# Patient Record
Sex: FEMALE | Race: WHITE | NOT HISPANIC OR LATINO | Employment: FULL TIME
[De-identification: names, ages, dates, MRNs, and addresses within clinical notes are randomized per-mention and may not be internally consistent; named-entity substitution may affect disease eponyms.]

---

## 2017-03-21 ENCOUNTER — RECORDS - HEALTHEAST (OUTPATIENT)
Dept: ADMINISTRATIVE | Facility: OTHER | Age: 22
End: 2017-03-21

## 2017-03-21 ASSESSMENT — MIFFLIN-ST. JEOR: SCORE: 1300.19

## 2017-03-23 ENCOUNTER — ANESTHESIA - HEALTHEAST (OUTPATIENT)
Dept: SURGERY | Facility: CLINIC | Age: 22
End: 2017-03-23

## 2017-03-23 ENCOUNTER — SURGERY - HEALTHEAST (OUTPATIENT)
Dept: SURGERY | Facility: CLINIC | Age: 22
End: 2017-03-23

## 2017-03-23 ASSESSMENT — MIFFLIN-ST. JEOR: SCORE: 1312.9

## 2017-03-24 ASSESSMENT — MIFFLIN-ST. JEOR: SCORE: 1333.31

## 2017-03-25 ASSESSMENT — MIFFLIN-ST. JEOR: SCORE: 1336.03

## 2017-03-26 ASSESSMENT — MIFFLIN-ST. JEOR: SCORE: 1334.67

## 2017-03-27 ENCOUNTER — AMBULATORY - HEALTHEAST (OUTPATIENT)
Dept: PULMONOLOGY | Facility: OTHER | Age: 22
End: 2017-03-27

## 2017-03-27 DIAGNOSIS — Z98.890 POST-OPERATIVE STATE: ICD-10-CM

## 2017-03-27 ASSESSMENT — MIFFLIN-ST. JEOR: SCORE: 1327.98

## 2017-03-28 ASSESSMENT — MIFFLIN-ST. JEOR: SCORE: 1314.71

## 2017-04-04 ENCOUNTER — RECORDS - HEALTHEAST (OUTPATIENT)
Dept: ADMINISTRATIVE | Facility: OTHER | Age: 22
End: 2017-04-04

## 2017-04-04 ENCOUNTER — COMMUNICATION - HEALTHEAST (OUTPATIENT)
Dept: PULMONOLOGY | Facility: OTHER | Age: 22
End: 2017-04-04

## 2017-04-13 ENCOUNTER — AMBULATORY - HEALTHEAST (OUTPATIENT)
Dept: PULMONOLOGY | Facility: OTHER | Age: 22
End: 2017-04-13

## 2017-04-13 ENCOUNTER — HOSPITAL ENCOUNTER (OUTPATIENT)
Dept: RADIOLOGY | Facility: HOSPITAL | Age: 22
Discharge: HOME | End: 2017-04-13
Attending: THORACIC SURGERY (CARDIOTHORACIC VASCULAR SURGERY)

## 2017-04-13 ENCOUNTER — OFFICE VISIT - HEALTHEAST (OUTPATIENT)
Dept: PULMONOLOGY | Facility: OTHER | Age: 22
End: 2017-04-13

## 2017-04-13 DIAGNOSIS — Z98.890 POST-OPERATIVE STATE: ICD-10-CM

## 2017-04-13 DIAGNOSIS — J93.83 SPONTANEOUS PNEUMOTHORAX: ICD-10-CM

## 2017-04-13 RX ORDER — POLYETHYLENE GLYCOL 3350 17 G/17G
17 POWDER, FOR SOLUTION ORAL DAILY PRN
Status: SHIPPED | COMMUNITY
Start: 2017-04-13 | End: 2022-03-04

## 2017-04-13 ASSESSMENT — MIFFLIN-ST. JEOR: SCORE: 1305.3

## 2018-04-27 ENCOUNTER — RECORDS - HEALTHEAST (OUTPATIENT)
Dept: ADMINISTRATIVE | Facility: OTHER | Age: 23
End: 2018-04-27

## 2018-04-27 ENCOUNTER — COMMUNICATION - HEALTHEAST (OUTPATIENT)
Dept: SCHEDULING | Facility: CLINIC | Age: 23
End: 2018-04-27

## 2019-04-17 ENCOUNTER — RECORDS - HEALTHEAST (OUTPATIENT)
Dept: LAB | Facility: CLINIC | Age: 24
End: 2019-04-17

## 2019-04-17 LAB — HIV 1+2 AB+HIV1 P24 AG SERPL QL IA: NEGATIVE

## 2019-04-18 LAB
C TRACH DNA SPEC QL PROBE+SIG AMP: NEGATIVE
N GONORRHOEA DNA SPEC QL NAA+PROBE: NEGATIVE
T PALLIDUM AB SER QL: NEGATIVE

## 2020-02-28 ENCOUNTER — RECORDS - HEALTHEAST (OUTPATIENT)
Dept: LAB | Facility: CLINIC | Age: 25
End: 2020-02-28

## 2020-02-28 LAB — HIV 1+2 AB+HIV1 P24 AG SERPL QL IA: NEGATIVE

## 2020-02-29 LAB — T PALLIDUM AB SER QL: NEGATIVE

## 2020-03-02 LAB
C TRACH DNA SPEC QL PROBE+SIG AMP: NEGATIVE
HSV1 IGG SERPL QL IA: POSITIVE
HSV2 IGG SERPL QL IA: NEGATIVE
N GONORRHOEA DNA SPEC QL NAA+PROBE: NEGATIVE

## 2020-03-13 ASSESSMENT — MIFFLIN-ST. JEOR
SCORE: 1326.95
SCORE: 1324.22

## 2020-03-16 ENCOUNTER — SURGERY - HEALTHEAST (OUTPATIENT)
Dept: SURGERY | Facility: CLINIC | Age: 25
End: 2020-03-16

## 2020-03-16 ENCOUNTER — ANESTHESIA - HEALTHEAST (OUTPATIENT)
Dept: SURGERY | Facility: CLINIC | Age: 25
End: 2020-03-16

## 2020-03-17 ASSESSMENT — MIFFLIN-ST. JEOR: SCORE: 1322.41

## 2020-03-19 ASSESSMENT — MIFFLIN-ST. JEOR: SCORE: 1324.68

## 2020-07-27 ENCOUNTER — RECORDS - HEALTHEAST (OUTPATIENT)
Dept: ADMINISTRATIVE | Facility: OTHER | Age: 25
End: 2020-07-27

## 2021-05-13 ENCOUNTER — RECORDS - HEALTHEAST (OUTPATIENT)
Dept: LAB | Facility: CLINIC | Age: 26
End: 2021-05-13

## 2021-05-15 LAB — BACTERIA SPEC CULT: NORMAL

## 2021-05-30 VITALS — BODY MASS INDEX: 18.24 KG/M2 | WEIGHT: 116.2 LBS | HEIGHT: 67 IN

## 2021-05-30 VITALS — WEIGHT: 115 LBS | HEIGHT: 67 IN | BODY MASS INDEX: 18.05 KG/M2

## 2021-06-01 VITALS — WEIGHT: 111 LBS | HEIGHT: 67 IN | BODY MASS INDEX: 17.39 KG/M2 | BODY MASS INDEX: 18.01 KG/M2

## 2021-06-04 VITALS — HEIGHT: 67 IN | BODY MASS INDEX: 18.72 KG/M2 | BODY MASS INDEX: 17.54 KG/M2 | WEIGHT: 112 LBS

## 2021-06-06 NOTE — ANESTHESIA POSTPROCEDURE EVALUATION
Patient: Gabriela Eason  Procedure(s):  THORACOSCOPY, LEFT LUNG WEDGE RESECTION, PLEURODESIS, CHEST TUBE PLACEMENT (Left)  Anesthesia type: general    Patient location: PACU  Last vitals:   Vitals Value Taken Time   /61 3/16/2020  2:26 PM   Temp 36.6  C (97.8  F) 3/16/2020  2:26 PM   Pulse 101 3/16/2020  2:26 PM   Resp 24 3/16/2020  2:26 PM   SpO2 98 % 3/16/2020  2:05 PM   Vitals shown include unvalidated device data.  Post vital signs: stable  Level of consciousness: awake and responds to simple questions  Post-anesthesia pain: pain controlled  Post-anesthesia nausea and vomiting: no  Pulmonary: unassisted, return to baseline  Cardiovascular: stable and blood pressure at baseline  Hydration: adequate  Anesthetic events: no    QCDR Measures:  ASA# 11 - Gabi-op Cardiac Arrest: ASA11B - Patient did NOT experience unanticipated cardiac arrest  ASA# 12 - Gabi-op Mortality Rate: ASA12B - Patient did NOT die  ASA# 13 - PACU Re-Intubation Rate: ASA13B - Patient did NOT require a new airway mgmt  ASA# 10 - Composite Anes Safety: ASA10A - No serious adverse event    Additional Notes:

## 2021-06-06 NOTE — ANESTHESIA CARE TRANSFER NOTE
Last vitals:   Vitals:    03/16/20 1246   BP: 108/66   Pulse: 89   Resp: 18   Temp: 36.1  C (97  F)   SpO2: 99%     Patient's level of consciousness is drowsy  Spontaneous respirations: yes  Maintains airway independently: yes  Dentition unchanged: yes  Oropharynx: oropharynx clear of all foreign objects    QCDR Measures:  ASA# 20 - Surgical Safety Checklist: WHO surgical safety checklist completed prior to induction    PQRS# 430 - Adult PONV Prevention: 4558F - Pt received => 2 anti-emetic agents (different classes) preop & intraop  ASA# 8 - Peds PONV Prevention: NA - Not pediatric patient, not GA or 2 or more risk factors NOT present  PQRS# 424 - Gabi-op Temp Management: 4559F - At least one body temp DOCUMENTED => 35.5C or 95.9F within required timeframe  PQRS# 426 - PACU Transfer Protocol: - Transfer of care checklist used  ASA# 14 - Acute Post-op Pain: ASA14B - Patient did NOT experience pain >= 7 out of 10

## 2021-06-09 NOTE — ANESTHESIA CARE TRANSFER NOTE
Last vitals:   Vitals:    03/23/17 1930   BP:    Pulse: 90   Resp: 20   Temp:    SpO2: 100%     Patient's level of consciousness is drowsy  Spontaneous respirations: yes  Maintains airway independently: yes  Dentition unchanged: yes  Oropharynx: oropharynx clear of all foreign objects    QCDR Measures:  ASA# 20 - Surgical Safety Checklist: ASA20A - Safety Checks Done  PQRS# 430 - Adult PONV Prevention: 4558F - Pt received => 2 anti-emetic agents (different classes) preop & intraop  ASA# 8 - Peds PONV Prevention: NA - Not pediatric patient, not GA or 2 or more risk factors NOT present  PQRS# 424 - Gabi-op Temp Management: 4559F - At least one body temp DOCUMENTED => 35.5C or 95.9F within required timeframe  PQRS# 426 - PACU Transfer Protocol: - Transfer of care checklist used  ASA# 14 - Acute Post-op Pain: ASA14B - Patient did NOT experience pain >= 7 out of 10    I completed my SBAR handoff to the receiving nurse per policy and procedure.

## 2021-06-09 NOTE — ANESTHESIA PREPROCEDURE EVALUATION
Anesthesia Evaluation      Patient summary reviewed   No history of anesthetic complications     Airway   Mallampati: I  Neck ROM: full   Pulmonary - normal exam                          Cardiovascular - negative ROS and normal exam  Exercise tolerance: > or = 10 METS  (-) murmur  Rhythm: regular  Rate: normal,    no murmur      Neuro/Psych - negative ROS     Endo/Other - negative ROS      GI/Hepatic/Renal - negative ROS           Dental - normal exam                        Anesthesia Plan  Planned anesthetic: general endotracheal    ASA 1   Induction: intravenous   Anesthetic plan and risks discussed with: patient and parent/guardian  Anesthesia plan special considerations: antiemetics,   Post-op plan: routine recovery

## 2021-06-09 NOTE — ANESTHESIA POSTPROCEDURE EVALUATION
Patient: Gabriela Eason  RIGHT VIDEO ASSISTED THORACOSCOPY WITH MECHANICAL PLEURODESIS AND APICAL PLEURECTOMY THROUGH A SUBXYPHIOD APPROACH  Anesthesia type: general    Patient location: PACU  Last vitals:   Vitals:    03/23/17 2015   BP: 112/58   Pulse: 84   Resp: 15   Temp:    SpO2: 100%     Post vital signs: stable  Level of consciousness: awake and responds to simple questions  Post-anesthesia pain: pain controlled  Post-anesthesia nausea and vomiting: no  Pulmonary: unassisted, return to baseline  Cardiovascular: stable and blood pressure at baseline  Hydration: adequate  Anesthetic events: no    QCDR Measures:  ASA# 11 - Gabi-op Cardiac Arrest: ASA11B - Patient did NOT experience unanticipated cardiac arrest  ASA# 12 - Gabi-op Mortality Rate: ASA12B - Patient did NOT die  ASA# 13 - PACU Re-Intubation Rate: ASA13B - Patient did NOT require a new airway mgmt  ASA# 10 - Composite Anes Safety: ASA10A - No serious adverse event  ASA# 38 - New Corneal Injury: ASA38A - No new exposure keratitis or corneal abrasion in PACU    Additional Notes:

## 2021-06-10 NOTE — PROGRESS NOTES
THORACIC SURGERY ESTABLISHED PATIENT OFFICE VISIT    Dear Dr. Adams,    I saw Ms. Gabriela Eason in follow-up today. The clinical summary follows:     PREOP DIAGNOSIS   Spontaneous RIGHT pneumothorax    PROCEDURE   RIGHT subxyphoid upper lobe wedge resection (blebectomy) and partial pleurectomy, mechanical pleurodesis    DATE OF PROCEDURE  3/23/2017    HISTOPATHOLOGY  Bleb, pleural scarring.    COMPLICATIONS  None    INTERVAL STUDIES  CXR today: expected postoperative changes    ETOH neg  TOB neg  BMI 18    SUBJECTIVE  Gabriela has essentially completely recovered.    IMPRESSION   1. Spontaneous pneumothorax       21 year-old female 3 weeks S/P RIGHT VATS blebectomy for spontaneous pneumothorax.    PLAN  I reviewed the plan as follows:    1) No further follow-up required with me       They had all their questions answered and were in agreement with the plan.  I appreciate the opportunity to participate in the care of your patient and will keep you updated.    Sincerely,

## 2021-06-15 PROBLEM — J93.0 TENSION PNEUMOTHORAX, SPONTANEOUS: Status: ACTIVE | Noted: 2017-03-21

## 2021-06-15 PROBLEM — J96.00 ACUTE RESPIRATORY FAILURE (H): Status: ACTIVE | Noted: 2017-03-24

## 2021-06-15 PROBLEM — J93.0 TENSION PNEUMOTHORAX: Status: ACTIVE | Noted: 2017-03-21

## 2021-06-15 PROBLEM — Z93.8: Status: ACTIVE | Noted: 2017-03-24

## 2021-06-15 PROBLEM — K59.00 CONSTIPATION: Status: ACTIVE | Noted: 2017-03-29

## 2021-06-16 PROBLEM — J93.9 PNEUMOTHORAX: Status: ACTIVE | Noted: 2020-03-13

## 2021-06-16 PROBLEM — Z96.89 CHEST TUBE IN PLACE: Status: ACTIVE | Noted: 2020-03-13

## 2021-08-10 ENCOUNTER — LAB REQUISITION (OUTPATIENT)
Dept: LAB | Facility: CLINIC | Age: 26
End: 2021-08-10
Payer: COMMERCIAL

## 2021-08-10 DIAGNOSIS — Z11.9 ENCOUNTER FOR SCREENING FOR INFECTIOUS AND PARASITIC DISEASES, UNSPECIFIED: ICD-10-CM

## 2021-08-10 DIAGNOSIS — Z11.4 ENCOUNTER FOR SCREENING FOR HUMAN IMMUNODEFICIENCY VIRUS (HIV): ICD-10-CM

## 2021-08-10 DIAGNOSIS — Z11.3 ENCOUNTER FOR SCREENING FOR INFECTIONS WITH A PREDOMINANTLY SEXUAL MODE OF TRANSMISSION: ICD-10-CM

## 2021-08-10 DIAGNOSIS — Z11.8 ENCOUNTER FOR SCREENING FOR OTHER INFECTIOUS AND PARASITIC DISEASES: ICD-10-CM

## 2021-08-10 LAB — HIV 1+2 AB+HIV1 P24 AG SERPL QL IA: NEGATIVE

## 2021-08-10 PROCEDURE — 87389 HIV-1 AG W/HIV-1&-2 AB AG IA: CPT | Performed by: NURSE PRACTITIONER

## 2021-08-10 PROCEDURE — 87591 N.GONORRHOEAE DNA AMP PROB: CPT | Mod: ORL | Performed by: NURSE PRACTITIONER

## 2021-08-10 PROCEDURE — 87086 URINE CULTURE/COLONY COUNT: CPT | Mod: ORL | Performed by: NURSE PRACTITIONER

## 2021-08-10 PROCEDURE — 86803 HEPATITIS C AB TEST: CPT | Performed by: NURSE PRACTITIONER

## 2021-08-10 PROCEDURE — 87491 CHLMYD TRACH DNA AMP PROBE: CPT | Mod: ORL | Performed by: NURSE PRACTITIONER

## 2021-08-11 LAB
BACTERIA UR CULT: NO GROWTH
HCV AB SERPL QL IA: NEGATIVE

## 2021-08-12 LAB
C TRACH DNA SPEC QL NAA+PROBE: NEGATIVE
N GONORRHOEA DNA SPEC QL NAA+PROBE: NEGATIVE

## 2022-01-11 ENCOUNTER — LAB REQUISITION (OUTPATIENT)
Dept: LAB | Facility: CLINIC | Age: 27
End: 2022-01-11
Payer: COMMERCIAL

## 2022-01-11 DIAGNOSIS — Z32.01 ENCOUNTER FOR PREGNANCY TEST, RESULT POSITIVE: ICD-10-CM

## 2022-01-11 LAB
ABO/RH(D): NORMAL
ANTIBODY SCREEN: NEGATIVE
BASOPHILS # BLD AUTO: 0 10E3/UL (ref 0–0.2)
BASOPHILS NFR BLD AUTO: 0 %
EOSINOPHIL # BLD AUTO: 0.1 10E3/UL (ref 0–0.7)
EOSINOPHIL NFR BLD AUTO: 1 %
ERYTHROCYTE [DISTWIDTH] IN BLOOD BY AUTOMATED COUNT: 12.2 % (ref 10–15)
HCT VFR BLD AUTO: 39.5 % (ref 35–47)
HGB BLD-MCNC: 13.2 G/DL (ref 11.7–15.7)
HIV 1+2 AB+HIV1 P24 AG SERPL QL IA: NEGATIVE
IMM GRANULOCYTES # BLD: 0 10E3/UL
IMM GRANULOCYTES NFR BLD: 0 %
LYMPHOCYTES # BLD AUTO: 2.1 10E3/UL (ref 0.8–5.3)
LYMPHOCYTES NFR BLD AUTO: 28 %
MCH RBC QN AUTO: 30.3 PG (ref 26.5–33)
MCHC RBC AUTO-ENTMCNC: 33.4 G/DL (ref 31.5–36.5)
MCV RBC AUTO: 91 FL (ref 78–100)
MONOCYTES # BLD AUTO: 0.4 10E3/UL (ref 0–1.3)
MONOCYTES NFR BLD AUTO: 6 %
NEUTROPHILS # BLD AUTO: 4.9 10E3/UL (ref 1.6–8.3)
NEUTROPHILS NFR BLD AUTO: 65 %
NRBC # BLD AUTO: 0 10E3/UL
NRBC BLD AUTO-RTO: 0 /100
PLATELET # BLD AUTO: 204 10E3/UL (ref 150–450)
RBC # BLD AUTO: 4.36 10E6/UL (ref 3.8–5.2)
SPECIMEN EXPIRATION DATE: NORMAL
SPECIMEN EXPIRATION DATE: NORMAL
WBC # BLD AUTO: 7.6 10E3/UL (ref 4–11)

## 2022-01-11 PROCEDURE — 86850 RBC ANTIBODY SCREEN: CPT | Performed by: NURSE PRACTITIONER

## 2022-01-11 PROCEDURE — 86762 RUBELLA ANTIBODY: CPT | Mod: ORL | Performed by: NURSE PRACTITIONER

## 2022-01-11 PROCEDURE — 86780 TREPONEMA PALLIDUM: CPT | Mod: ORL | Performed by: NURSE PRACTITIONER

## 2022-01-11 PROCEDURE — 87491 CHLMYD TRACH DNA AMP PROBE: CPT | Mod: ORL | Performed by: NURSE PRACTITIONER

## 2022-01-11 PROCEDURE — 87389 HIV-1 AG W/HIV-1&-2 AB AG IA: CPT | Mod: ORL | Performed by: NURSE PRACTITIONER

## 2022-01-11 PROCEDURE — 86803 HEPATITIS C AB TEST: CPT | Mod: ORL | Performed by: NURSE PRACTITIONER

## 2022-01-11 PROCEDURE — 86901 BLOOD TYPING SEROLOGIC RH(D): CPT | Performed by: NURSE PRACTITIONER

## 2022-01-11 PROCEDURE — 85025 COMPLETE CBC W/AUTO DIFF WBC: CPT | Mod: ORL | Performed by: NURSE PRACTITIONER

## 2022-01-11 PROCEDURE — 87340 HEPATITIS B SURFACE AG IA: CPT | Mod: ORL | Performed by: NURSE PRACTITIONER

## 2022-01-11 PROCEDURE — 87086 URINE CULTURE/COLONY COUNT: CPT | Mod: ORL | Performed by: NURSE PRACTITIONER

## 2022-01-12 LAB
BACTERIA UR CULT: NO GROWTH
HBV SURFACE AG SERPL QL IA: NONREACTIVE
HCV AB SERPL QL IA: NEGATIVE
RUBV IGG SERPL QL IA: 1.76 INDEX
RUBV IGG SERPL QL IA: POSITIVE
T PALLIDUM AB SER QL: NONREACTIVE

## 2022-01-13 LAB
C TRACH DNA SPEC QL PROBE+SIG AMP: NEGATIVE
N GONORRHOEA DNA SPEC QL NAA+PROBE: NEGATIVE

## 2022-01-18 ENCOUNTER — HOSPITAL ENCOUNTER (EMERGENCY)
Facility: HOSPITAL | Age: 27
Discharge: HOME | End: 2022-01-19
Attending: EMERGENCY MEDICINE | Admitting: EMERGENCY MEDICINE
Payer: COMMERCIAL

## 2022-01-18 VITALS — WEIGHT: 119.5 LBS | HEIGHT: 67 IN | BODY MASS INDEX: 18.75 KG/M2

## 2022-01-18 DIAGNOSIS — J93.9 PNEUMOTHORAX ON RIGHT: ICD-10-CM

## 2022-01-18 DIAGNOSIS — S29.8XXA BLUNT CHEST TRAUMA, INITIAL ENCOUNTER: ICD-10-CM

## 2022-01-18 PROCEDURE — 99283 EMERGENCY DEPT VISIT LOW MDM: CPT | Mod: 25

## 2022-01-19 ENCOUNTER — APPOINTMENT (OUTPATIENT)
Dept: RADIOLOGY | Facility: HOSPITAL | Age: 27
End: 2022-01-19
Attending: EMERGENCY MEDICINE
Payer: COMMERCIAL

## 2022-01-19 VITALS
TEMPERATURE: 98.7 F | BODY MASS INDEX: 18.18 KG/M2 | OXYGEN SATURATION: 99 % | DIASTOLIC BLOOD PRESSURE: 63 MMHG | RESPIRATION RATE: 16 BRPM | WEIGHT: 119.93 LBS | SYSTOLIC BLOOD PRESSURE: 106 MMHG | HEART RATE: 83 BPM | HEIGHT: 68 IN

## 2022-01-19 PROCEDURE — 71046 X-RAY EXAM CHEST 2 VIEWS: CPT

## 2022-01-19 RX ORDER — ACETAMINOPHEN 325 MG/1
975 TABLET ORAL ONCE
Status: DISCONTINUED | OUTPATIENT
Start: 2022-01-19 | End: 2022-01-19 | Stop reason: HOSPADM

## 2022-01-19 ASSESSMENT — MIFFLIN-ST. JEOR: SCORE: 1334.56

## 2022-01-19 NOTE — DISCHARGE INSTRUCTIONS
Follow-up with your clinic doctors tomorrow as previously arranged  If you develop worsening shortness of breath or chest pain then you should have a repeat chest x-ray to evaluate your possible pneumothorax.  Tylenol 650 mg every 4 hours as needed for pain  Return to the emergency department for worsening problems or concerns

## 2022-01-19 NOTE — ED PROVIDER NOTES
EMERGENCY DEPARTMENT ENCOUnter      NAME: Gabriela Eason  AGE: 26 year old female  YOB: 1995  MRN: [id number]  EVALUATION DATE & TIME: 01/19/22 1:36 AM     PCP: Dax Trinidad [facility]    ED PROVIDER: Erica Menchaca MD      Chief Complaint   Patient presents with     Fall     Right Ribs Area Pain         FINAL IMPRESSION:  1. Pneumothorax on right    2. Blunt chest trauma, initial encounter          ED COURSE & MEDICAL DECISION MAKING:      In summary, the patient is a 27 yo female that presents to the ED for evaluation of rib pain after a fall thought secondary to a small pneumothorax and probable chest wall contusion.  We will manage conservatively with close outpatient follow up.  1:30AM I met with the patient for the initial interview and physical examination. Discussed plan for treatment and workup in the ED. PPE: Provider wore surgical cap, goggles, and N95 mask.  2:37 AM I discussed the case with Dr. Waldrop, general surgeon.  He recommends conservative management without chest tube.  He did not think patient needed to be admitted nor a repeat chest xray unless her symptoms worsened.  2:45 AM I reassessed the patient and updated her on the results. I discussed the plan for discharge with the patient, and patient is agreeable. We discussed supportive cares at home and reasons for return to the ER including new or worsening symptoms - all questions and concerns addressed. Patient to be discharged by RN.     At the conclusion of the encounter I discussed the results of all of the tests and the disposition. The questions were answered. The patient or family acknowledged understanding and was agreeable with the care plan.         MEDICATIONS GIVEN IN THE EMERGENCY:  Medications - No data to display    NEW PRESCRIPTIONS STARTED AT TODAY'S ER VISIT  There are no discharge medications for this patient.          =================================================================    HPI    Gabriela Eason is a 26 year old female with a history of pneumothorax who presents to this ED for evaluation of fall and rib pain.     Per chart review, patient was seen at [facility] on 1/18/22 (1 day ago) for evaluation of head injury. Provider felt that history, symptoms, and presentation was consistent with headache and possibly a mild concussion. No imaging was performed and patient understood that intracranial abnormality could not be ruled out in clinic. Was also concerned about the pregnancy, informed that imaging was not available at that location. Patient declined a higher level evaluation in the ER and preferred supportive management and symptomatic treatment at home.     Patient had a fall yesterday morning (1/18) where she was walking around in socks, slipped on the wood floor, fell backwards, and struck her head on the floor. She was evaluated at urgent care that morning and had a normal head CT, but no chest pain at that time. However, reports right anterior chest pain onset tonight which wraps around to her back, currently rated 7/10. Provoking factors include taking breaths. Notes history of pneumothorax. Patient is currently 6 weeks pregnant, only taking prenatal vitamins. No known allergies to medications. Patient does not have any other associated complaints or concerns at this time.     Social: Denies tobacco use. Occasional alcohol use. Works in healthcare.       REVIEW OF SYSTEMS     Constitutional:  Denies fever or chills  HENT:  Denies sore throat   Respiratory:  Denies cough or shortness of breath   Cardiovascular:  Denies palpitations. Reports right chest wall pain  GI:  Denies abdominal pain, nausea, or vomiting  Musculoskeletal:  Denies any new extremity pain   Skin:  Denies rash   Neurologic:  Denies headache, focal weakness or sensory changes    All other systems reviewed and are negative      PAST  MEDICAL HISTORY:  Past Medical History:   Diagnosis Date     Acute respiratory failure (H) 3/24/2017     PONV (postoperative nausea and vomiting)      Status post thoracostomy tube placement (H) 3/24/2017       PAST SURGICAL HISTORY:  Past Surgical History:   Procedure Laterality Date     THORACOSCOPY Right 3/23/2017    Procedure: RIGHT VIDEO ASSISTED THORACOSCOPY WITH MECHANICAL PLEURODESIS AND APICAL PLEURECTOMY THROUGH A SUBXYPHIOD APPROACH;  Surgeon: Ricky Hernandez MD;  Location: [facility];  Service:      [facility] THORACOSCOPY,DX NO BX Left 3/16/2020    Procedure: THORACOSCOPY, LEFT LUNG WEDGE RESECTION, PLEURODESIS, CHEST TUBE PLACEMENT;  Surgeon: Dax Arias MD;  Location: [facility];  Service: General           CURRENT MEDICATIONS:    FLUoxetine (PROZAC) 20 MG capsule  norgestimate-ethinyl estradioL (ESTARYLLA) 0.25-35 mg-mcg per tablet  polyethylene glycol (MIRALAX) 17 gram packet        ALLERGIES:  No Known Allergies    SOCIAL HISTORY:   Social History     Socioeconomic History     Marital status: Single     Spouse name: None     Number of children: None     Years of education: None     Highest education level: None   Occupational History     None   Tobacco Use     Smoking status: Never Smoker     Smokeless tobacco: Never Used   Substance and Sexual Activity     Alcohol use: No     Drug use: No     Sexual activity: Never     Birth control/protection: Injection   Other Topics Concern     None   Social History Narrative    ** Merged History Encounter **          Social Determinants of Health     Financial Resource Strain: Not on file   Food Insecurity: Not on file   Transportation Needs: Not on file   Physical Activity: Not on file   Stress: Not on file   Social Connections: Not on file   Intimate Partner Violence: Not on file   Housing Stability: Not on file       PHYSICAL EXAM    Constitutional:  Well developed, Well nourished,  HENT:  Normocephalic, Atraumatic, Bilateral external  ears normal, Oropharynx moist, Nose normal.   Neck:  Normal range of motion, No meningismus, No stridor.   Eyes:  EOMI, Conjunctiva normal, No discharge.   Respiratory:  Normal breath sounds, No respiratory distress, No wheezing, right sided chest tenderness.   Cardiovascular:  Normal heart rate, Normal rhythm, No murmurs  GI:  Soft, No tenderness, No guarding, No CVA tenderness.   Musculoskeletal:  Neurovascularly intact distally, No edema, No tenderness, No cyanosis, Good range of motion in all major joints. No tenderness to palpation or major deformities noted.   Integument:  Warm, Dry, No erythema, No rash.   Lymphatic:  No lymphadenopathy noted.   Neurologic:  Alert & oriented , Normal motor function,  No focal deficits noted.   Psychiatric:  Affect normal, Judgment normal, Mood normal.      LAB:  All pertinent labs reviewed and interpreted.  Results for orders placed or performed during the hospital encounter of 01/18/22   Chest XR,  PA & LAT    Impression    IMPRESSION: Possible very subtle right apical pneumothorax measuring approximately 3 mm. Given history of trauma consider further evaluation with chest CT to confirm. Postoperative changes in both lung apices with surgical staple line. No pulmonary   infiltrates. New metallic density projecting over the subcutaneous tissues of the posterior back on the right. This may be due to penetrating trauma if there is any concerning clinical history. Normal heart size and pulmonary vascularity. No mediastinal   shift. No overt osseous abnormality.       RADIOLOGY:  I have independently reviewed and interpreted the above imaging, pending the final radiology read.  Chest XR,  PA & LAT   Final Result   IMPRESSION: Possible very subtle right apical pneumothorax measuring approximately 3 mm. Given history of trauma consider further evaluation with chest CT to confirm. Postoperative changes in both lung apices with surgical staple line. No pulmonary    infiltrates. New  metallic density projecting over the subcutaneous tissues of the posterior back on the right. This may be due to penetrating trauma if there is any concerning clinical history. Normal heart size and pulmonary vascularity. No mediastinal    shift. No overt osseous abnormality.                I, Kelsi Madrigal, am serving as a scribe to document services personally performed by Dr. Menchaca based on my observation and the provider's statements to me. I, Erica Menchaca MD attest that Kelsi Madrigal is acting in a scribe capacity, has observed my performance of the services and has documented them in accordance with my direction.    Erica Menchaca MD  Emergency Medicine  [facility] EMERGENCY DEPARTMENT  [facility] [facility] [id number] [phone]  Dept: [phone]     Erica Menchaca MD  02/05/22 7521

## 2022-01-19 NOTE — ED TRIAGE NOTES
Pt fell this past am and hit her right ribs. Pt was seen at . Pt now complains of right ribs area pain. Pt is 6 weeks pregnant

## 2022-02-08 ENCOUNTER — LAB REQUISITION (OUTPATIENT)
Dept: LAB | Facility: CLINIC | Age: 27
End: 2022-02-08

## 2022-02-08 PROCEDURE — 86706 HEP B SURFACE ANTIBODY: CPT | Performed by: FAMILY MEDICINE

## 2022-02-08 PROCEDURE — G0123 SCREEN CERV/VAG THIN LAYER: HCPCS | Performed by: FAMILY MEDICINE

## 2022-02-09 LAB
BKR LAB AP GYN ADEQUACY: NORMAL
BKR LAB AP GYN INTERPRETATION: NORMAL
BKR LAB AP HPV REFLEX: NORMAL
BKR LAB AP LMP: NORMAL
BKR LAB AP PREVIOUS ABNORMAL: NORMAL
HBV SURFACE AB SERPLBLD QL IA.RAPID: NEGATIVE
PATH REPORT.COMMENTS IMP SPEC: NORMAL
PATH REPORT.COMMENTS IMP SPEC: NORMAL
PATH REPORT.RELEVANT HX SPEC: NORMAL

## 2022-03-04 ENCOUNTER — HOSPITAL ENCOUNTER (OUTPATIENT)
Facility: HOSPITAL | Age: 27
Setting detail: OBSERVATION
Discharge: HOME | End: 2022-03-06
Attending: EMERGENCY MEDICINE | Admitting: INTERNAL MEDICINE
Payer: COMMERCIAL

## 2022-03-04 ENCOUNTER — APPOINTMENT (OUTPATIENT)
Dept: RADIOLOGY | Facility: HOSPITAL | Age: 27
End: 2022-03-04
Attending: EMERGENCY MEDICINE
Payer: COMMERCIAL

## 2022-03-04 ENCOUNTER — APPOINTMENT (OUTPATIENT)
Dept: RADIOLOGY | Facility: HOSPITAL | Age: 27
End: 2022-03-04
Attending: INTERNAL MEDICINE
Payer: COMMERCIAL

## 2022-03-04 DIAGNOSIS — Z96.89 CHEST TUBE IN PLACE: ICD-10-CM

## 2022-03-04 DIAGNOSIS — J93.9 PNEUMOTHORAX ON RIGHT: ICD-10-CM

## 2022-03-04 DIAGNOSIS — Z3A.13 13 WEEKS GESTATION OF PREGNANCY: ICD-10-CM

## 2022-03-04 DIAGNOSIS — J93.0 TENSION PNEUMOTHORAX, SPONTANEOUS: ICD-10-CM

## 2022-03-04 DIAGNOSIS — J93.9 PNEUMOTHORAX, UNSPECIFIED TYPE: Primary | ICD-10-CM

## 2022-03-04 DIAGNOSIS — J93.0 TENSION PNEUMOTHORAX: ICD-10-CM

## 2022-03-04 DIAGNOSIS — Z93.8: ICD-10-CM

## 2022-03-04 LAB
ANION GAP SERPL CALCULATED.3IONS-SCNC: 10 MMOL/L (ref 5–18)
BASOPHILS # BLD AUTO: 0 10E3/UL (ref 0–0.2)
BASOPHILS NFR BLD AUTO: 0 %
BUN SERPL-MCNC: 7 MG/DL (ref 8–22)
CALCIUM SERPL-MCNC: 8.8 MG/DL (ref 8.5–10.5)
CHLORIDE BLD-SCNC: 104 MMOL/L (ref 98–107)
CO2 SERPL-SCNC: 21 MMOL/L (ref 22–31)
CREAT SERPL-MCNC: 0.64 MG/DL (ref 0.6–1.1)
EOSINOPHIL # BLD AUTO: 0.1 10E3/UL (ref 0–0.7)
EOSINOPHIL NFR BLD AUTO: 1 %
ERYTHROCYTE [DISTWIDTH] IN BLOOD BY AUTOMATED COUNT: 11.9 % (ref 10–15)
GFR SERPL CREATININE-BSD FRML MDRD: >90 ML/MIN/1.73M2
GLUCOSE BLD-MCNC: 127 MG/DL (ref 70–125)
HCT VFR BLD AUTO: 37.1 % (ref 35–47)
HGB BLD-MCNC: 12.3 G/DL (ref 11.7–15.7)
IMM GRANULOCYTES # BLD: 0 10E3/UL
IMM GRANULOCYTES NFR BLD: 0 %
LYMPHOCYTES # BLD AUTO: 2.3 10E3/UL (ref 0.8–5.3)
LYMPHOCYTES NFR BLD AUTO: 30 %
MCH RBC QN AUTO: 30.1 PG (ref 26.5–33)
MCHC RBC AUTO-ENTMCNC: 33.2 G/DL (ref 31.5–36.5)
MCV RBC AUTO: 91 FL (ref 78–100)
MONOCYTES # BLD AUTO: 0.4 10E3/UL (ref 0–1.3)
MONOCYTES NFR BLD AUTO: 5 %
NEUTROPHILS # BLD AUTO: 4.8 10E3/UL (ref 1.6–8.3)
NEUTROPHILS NFR BLD AUTO: 64 %
NRBC # BLD AUTO: 0 10E3/UL
NRBC BLD AUTO-RTO: 0 /100
PLATELET # BLD AUTO: 178 10E3/UL (ref 150–450)
POTASSIUM BLD-SCNC: 3.3 MMOL/L (ref 3.5–5)
POTASSIUM BLD-SCNC: 3.6 MMOL/L (ref 3.5–5)
RBC # BLD AUTO: 4.08 10E6/UL (ref 3.8–5.2)
SARS-COV-2 RNA RESP QL NAA+PROBE: NEGATIVE
SODIUM SERPL-SCNC: 135 MMOL/L (ref 136–145)
WBC # BLD AUTO: 7.6 10E3/UL (ref 4–11)

## 2022-03-04 PROCEDURE — 99285 EMERGENCY DEPT VISIT HI MDM: CPT | Mod: 25

## 2022-03-04 PROCEDURE — 250N000011 HC RX IP 250 OP 636: Performed by: INTERNAL MEDICINE

## 2022-03-04 PROCEDURE — 84132 ASSAY OF SERUM POTASSIUM: CPT | Mod: 91 | Performed by: INTERNAL MEDICINE

## 2022-03-04 PROCEDURE — 36415 COLL VENOUS BLD VENIPUNCTURE: CPT | Performed by: INTERNAL MEDICINE

## 2022-03-04 PROCEDURE — 71046 X-RAY EXAM CHEST 2 VIEWS: CPT

## 2022-03-04 PROCEDURE — 250N000013 HC RX MED GY IP 250 OP 250 PS 637: Performed by: INTERNAL MEDICINE

## 2022-03-04 PROCEDURE — G0378 HOSPITAL OBSERVATION PER HR: HCPCS

## 2022-03-04 PROCEDURE — 99207 PR CDG-CODE CATEGORY CHANGED: CPT | Performed by: INTERNAL MEDICINE

## 2022-03-04 PROCEDURE — 71045 X-RAY EXAM CHEST 1 VIEW: CPT

## 2022-03-04 PROCEDURE — U0005 INFEC AGEN DETEC AMPLI PROBE: HCPCS | Performed by: EMERGENCY MEDICINE

## 2022-03-04 PROCEDURE — 96375 TX/PRO/DX INJ NEW DRUG ADDON: CPT

## 2022-03-04 PROCEDURE — 99221 1ST HOSP IP/OBS SF/LOW 40: CPT | Performed by: INTERNAL MEDICINE

## 2022-03-04 PROCEDURE — 82310 ASSAY OF CALCIUM: CPT | Performed by: INTERNAL MEDICINE

## 2022-03-04 PROCEDURE — 85025 COMPLETE CBC W/AUTO DIFF WBC: CPT | Performed by: INTERNAL MEDICINE

## 2022-03-04 PROCEDURE — 99220 PR INITIAL OBSERVATION CARE,LEVEL III: CPT | Performed by: INTERNAL MEDICINE

## 2022-03-04 PROCEDURE — C9803 HOPD COVID-19 SPEC COLLECT: HCPCS

## 2022-03-04 RX ORDER — POTASSIUM CHLORIDE 1500 MG/1
20 TABLET, EXTENDED RELEASE ORAL ONCE
Status: COMPLETED | OUTPATIENT
Start: 2022-03-04 | End: 2022-03-04

## 2022-03-04 RX ORDER — PRENATAL VIT/IRON FUM/FOLIC AC 27MG-0.8MG
1 TABLET ORAL DAILY
Status: DISCONTINUED | OUTPATIENT
Start: 2022-03-04 | End: 2022-03-06 | Stop reason: HOSPADM

## 2022-03-04 RX ORDER — LIDOCAINE 40 MG/G
CREAM TOPICAL
Status: DISCONTINUED | OUTPATIENT
Start: 2022-03-04 | End: 2022-03-06 | Stop reason: HOSPADM

## 2022-03-04 RX ORDER — FAMOTIDINE 40 MG/1
40 TABLET, FILM COATED ORAL AT BEDTIME
Status: ON HOLD | COMMUNITY
Start: 2022-01-12 | End: 2022-09-08

## 2022-03-04 RX ORDER — POTASSIUM CHLORIDE 7.45 MG/ML
10 INJECTION INTRAVENOUS
Status: DISCONTINUED | OUTPATIENT
Start: 2022-03-04 | End: 2022-03-04

## 2022-03-04 RX ADMIN — PRENATAL VIT W/ FE FUMARATE-FA TAB 27-0.8 MG 1 TABLET: 27-0.8 TAB at 20:20

## 2022-03-04 RX ADMIN — POTASSIUM CHLORIDE 10 MEQ: 7.46 INJECTION, SOLUTION INTRAVENOUS at 20:45

## 2022-03-04 RX ADMIN — POTASSIUM CHLORIDE 20 MEQ: 1500 TABLET, EXTENDED RELEASE ORAL at 21:32

## 2022-03-04 ASSESSMENT — ACTIVITIES OF DAILY LIVING (ADL): DEPENDENT_IADLS:: INDEPENDENT

## 2022-03-04 ASSESSMENT — ENCOUNTER SYMPTOMS
COUGH: 0
FEVER: 0
SHORTNESS OF BREATH: 1

## 2022-03-04 NOTE — ED PROVIDER NOTES
EMERGENCY DEPARTMENT ENCOUNTER      NAME: Gabriela Eason  AGE: 26 year old female  YOB: 1995  MRN: [id number]  EVALUATION DATE & TIME: 3/4/2022  1:59 PM    PCP: Dax Trinidad [facility]    ED PROVIDER: Yu Da Silva M.D.      Chief Complaint   Patient presents with     Shortness of Breath     FINAL IMPRESSION:  1. Pneumothorax on right      ED COURSE & MEDICAL DECISION MAKING:    Pertinent Labs & Imaging studies reviewed. (See chart for details)  ED Course as of 03/04/22 2132   Fri Mar 04, 2022   1415 Patient is a 26-year-old female who comes in today with shortness of breath that started around 10:00 this morning.  She also reports chest pain on that right side.  She had similar symptoms before when she has had pneumothoraces.  She had an episode several years ago where she needed a chest tube because she had a large pneumothorax.  She then had an episode a few months ago where she had a small pneumothorax but did not need any treatment.  She was just sent home and did fine.  Symptoms now started again.  Her chest x-ray shows a 1.6 cm pneumothorax.  It is unclear why she gets recurrent pneumothoraces.  She otherwise is stable.  Oxygen is 100%.  We will put her on oxygen and then bring her into the hospital for observation and serial chest x-rays.  I will put a call out to pulmonology to make sure they are comfortable with this plan.   1356 I spoke with Dr. Weaver with Pulmonary who agrees with    1505 I spoke with the hospitalist who agreed with admission to the hospital.  Patient will come in as a MedSurg observation.       2:18 PM I met with the patient to gather history and to perform my initial exam. I discussed the plan for care while in the Emergency Department. PPE (gloves, eye protection, surgical cap, N95 mask) was worn by me during patient encounters while patient wore mask.   2:50 PM I discussed patient's case with Dr. Weaver, pulmonary.   2:56 PM I discussed the case with  hospitalist, Dr. Blanco, who accepts the patient for observation.   3:14 PM I re-evaluated and updated patient. Discussed plan for admission and patient is agreeable. She is on a nonrebreather.     At the conclusion of the encounter I discussed  the results of all of the tests and the disposition with patient.   All questions were answered.  The patient acknowledged understanding and was involved in the decision making regarding the overall care plan.      MEDICATIONS GIVEN IN THE EMERGENCY:  Medications   prenatal multivitamin w/iron per tablet 1 tablet (1 tablet Oral Given 3/4/22 2020)   lidocaine 1 % 0.1-1 mL (has no administration in time range)   lidocaine (LMX4) cream (has no administration in time range)   sodium chloride (PF) 0.9% PF flush 3 mL (3 mLs Intracatheter Given 3/4/22 2020)   sodium chloride (PF) 0.9% PF flush 3 mL (has no administration in time range)   potassium chloride ER (KLOR-CON M) CR tablet 20 mEq (has no administration in time range)        =================================================================    HPI    Triage Note: Pt states shortness of breath that started this morning at 1040. Pt has history of spontaneous pneumothorax.        Patient information was obtained from: Patient    Use of : N/A         Gabriela Eason is a 26 year old female  currently at 12 weeks gestation with a history of spontaneous pneumothorax who presents for evaluation of shortness of breath and chest pain.    Patient reports an onset of right side chest pain at 1030 this morning with associated shortness of breath. She has a history of 2 prior spontaneous pneumothoraces; her first was 5-6 years ago that required chest tube placement and she notes having a small one several months ago that was small and she was sent home without any intervention. She does note some mild right sided chest pain that has been ongoing.    Of note, patient is 12 weeks pregnant and states she has not had any  complications or issues with the pregnancy. She does note some mild vaginal bleeding noted when wiping that occurs once a month. Denies any abdominal cramping.     Patient denies additional medical concerns or complaints at this time.      REVIEW OF SYSTEMS   Except as stated in the HPI all other systems reviewed and are negative.    PAST MEDICAL HISTORY:  Past Medical History:   Diagnosis Date     Acute respiratory failure (H) 3/24/2017     PONV (postoperative nausea and vomiting)      Status post thoracostomy tube placement (H) 3/24/2017       PAST SURGICAL HISTORY:  Past Surgical History:   Procedure Laterality Date     THORACOSCOPY Right 3/23/2017    Procedure: RIGHT VIDEO ASSISTED THORACOSCOPY WITH MECHANICAL PLEURODESIS AND APICAL PLEURECTOMY THROUGH A SUBXYPHIOD APPROACH;  Surgeon: Ricky Hernandez MD;  Location: [facility];  Service:      [facility] THORACOSCOPY,DX NO BX Left 3/16/2020    Procedure: THORACOSCOPY, LEFT LUNG WEDGE RESECTION, PLEURODESIS, CHEST TUBE PLACEMENT;  Surgeon: Dax Arias MD;  Location: [facility];  Service: General       CURRENT MEDICATIONS:      Current Facility-Administered Medications:      lidocaine (LMX4) cream, , Topical, Q1H PRN, Rodrigo Blanco MD     lidocaine 1 % 0.1-1 mL, 0.1-1 mL, Other, Q1H PRN, Rodrigo Blanco MD     potassium chloride ER (KLOR-CON M) CR tablet 20 mEq, 20 mEq, Oral, Once, Rodrigo Blanco MD     prenatal multivitamin w/iron per tablet 1 tablet, 1 tablet, Oral, Daily, Rodrigo Blanco MD, 1 tablet at 03/04/22 2020     sodium chloride (PF) 0.9% PF flush 3 mL, 3 mL, Intracatheter, Q8H, Rodrigo Blanco MD, 3 mL at 03/04/22 2020     sodium chloride (PF) 0.9% PF flush 3 mL, 3 mL, Intracatheter, q1 min prn, Rodrigo Blanco MD    ALLERGIES:  No Known Allergies    FAMILY HISTORY:  History reviewed. No pertinent family history.    SOCIAL HISTORY:   Social History     Socioeconomic History     Marital  "status: Single     Spouse name: Not on file     Number of children: Not on file     Years of education: Not on file     Highest education level: Not on file   Occupational History     Not on file   Tobacco Use     Smoking status: Never Smoker     Smokeless tobacco: Never Used   Substance and Sexual Activity     Alcohol use: No     Drug use: No     Sexual activity: Never     Birth control/protection: Injection   Other Topics Concern     Not on file   Social History Narrative    ** Merged History Encounter **          Social Determinants of Health     Financial Resource Strain: Not on file   Food Insecurity: Not on file   Transportation Needs: Not on file   Physical Activity: Not on file   Stress: Not on file   Social Connections: Not on file   Intimate Partner Violence: Not on file   Housing Stability: Not on file       PHYSICAL EXAM    VITAL SIGNS: BP 96/52 (BP Location: Left arm)   Pulse 80   Temp 98.2  F (36.8  C) (Oral)   Resp 18   Ht 1.702 m (5' 7\")   Wt 54.4 kg (120 lb)   SpO2 100%   BMI 18.79 kg/m     GENERAL: Awake, Alert, answering questions, No acute distress, Well nourished  HEENT: Normal cephalic, Atraumatic, bilateral external ears normal, No scleral icterus, mask in place  NECK: No obvious swelling or abnormality, No stridor  PULMONARY:Normal and symmetric breath sounds, No respiratory distress, Lungs clear to auscultation bilaterally. No wheezing  CARDIOVASCULAR: Regular rate and rhythm, Distal pulses present and normal.  ABDOMINAL: Soft, Nondistended, Nontender, No flank tenderness, No palpable masses  BACK: No bruising or tenderness.  EXTREMITIES: Moves all extremities spontaneously, warm, no edema, No major deformities  NEURO: No facial droop, normal motor function, Normal speech   PSYCH: Normal mood and affect  SKIN: No rashes on visualized skin, dry, warm     LAB:  All pertinent labs reviewed and interpreted.  Results for orders placed or performed during the hospital encounter of 03/04/22 "   Chest XR,  PA & LAT    Impression    IMPRESSION: Postsurgical changes in the right apex. There is a small right pneumothorax present with the air-filled right apical pleural space measuring 1.2 cm in comparison to 2.6 cm on the prior exam. Pneumothorax has increased in size.   XR Chest 1 View    Impression    IMPRESSION: Trace right apical pneumothorax is decreased in volume with 7 mm craniocaudal separation between the visceral and parietal pleura previously 12 mm. Biapical wedge resection suture lines.   Basic metabolic panel   Result Value Ref Range    Sodium 135 (L) 136 - 145 mmol/L    Potassium 3.3 (L) 3.5 - 5.0 mmol/L    Chloride 104 98 - 107 mmol/L    Carbon Dioxide (CO2) 21 (L) 22 - 31 mmol/L    Anion Gap 10 5 - 18 mmol/L    Urea Nitrogen 7 (L) 8 - 22 mg/dL    Creatinine 0.64 0.60 - 1.10 mg/dL    Calcium 8.8 8.5 - 10.5 mg/dL    Glucose 127 (H) 70 - 125 mg/dL    GFR Estimate >90 >60 mL/min/1.73m2   CBC with platelets and differential   Result Value Ref Range    WBC Count 7.6 4.0 - 11.0 10e3/uL    RBC Count 4.08 3.80 - 5.20 10e6/uL    Hemoglobin 12.3 11.7 - 15.7 g/dL    Hematocrit 37.1 35.0 - 47.0 %    MCV 91 78 - 100 fL    MCH 30.1 26.5 - 33.0 pg    MCHC 33.2 31.5 - 36.5 g/dL    RDW 11.9 10.0 - 15.0 %    Platelet Count 178 150 - 450 10e3/uL    % Neutrophils 64 %    % Lymphocytes 30 %    % Monocytes 5 %    % Eosinophils 1 %    % Basophils 0 %    % Immature Granulocytes 0 %    NRBCs per 100 WBC 0 <1 /100    Absolute Neutrophils 4.8 1.6 - 8.3 10e3/uL    Absolute Lymphocytes 2.3 0.8 - 5.3 10e3/uL    Absolute Monocytes 0.4 0.0 - 1.3 10e3/uL    Absolute Eosinophils 0.1 0.0 - 0.7 10e3/uL    Absolute Basophils 0.0 0.0 - 0.2 10e3/uL    Absolute Immature Granulocytes 0.0 <=0.4 10e3/uL    Absolute NRBCs 0.0 10e3/uL       RADIOLOGY:  XR Chest 1 View   Final Result   IMPRESSION: Trace right apical pneumothorax is decreased in volume with 7 mm craniocaudal separation between the visceral and parietal pleura previously 12  mm. Biapical wedge resection suture lines.      Chest XR,  PA & LAT   Final Result   Addendum 1 of 1   Addendum: Prior measurement 0.6 cm and not 2.6 cm.      Final   IMPRESSION: Postsurgical changes in the right apex. There is a small right pneumothorax present with the air-filled right apical pleural space measuring 1.2 cm in comparison to 2.6 cm on the prior exam. Pneumothorax has increased in size.            I, Mabel Barraza, am serving as a scribe to document services personally performed by Dr. Da Silva based on my observation and the provider's statements to me. I, Yu Da Silva MD attest that Mabel Barraza is acting in a scribe capacity, has observed my performance of the services and has documented them in accordance with my direction.    Yu Da Silva M.D.  Emergency Medicine  [facility] EMERGENCY DEPARTMENT  [facility] [facility] [address]  [phone]  Dept: [phone]     Yu Da Silva MD  03/04/22 8107

## 2022-03-04 NOTE — PHARMACY-ADMISSION MEDICATION HISTORY
Pharmacy Note - Admission Medication History    Pertinent Provider Information: Pt was taking prenatal vitamins but stopped due to nausea. Also has a prescription for Reglan as needed with meals but hasn't been eating much so not taking.      ______________________________________________________________________    Prior To Admission (PTA) med list completed and updated in EMR.       PTA Med List   Medication Sig Last Dose     famotidine (PEPCID) 40 MG tablet Take 40 mg by mouth At Bedtime 3/3/2022 at PM       Information source(s): Patient and CareEverywhere/SureScripts  Method of interview communication: in-person    Summary of Changes to PTA Med List  New: Pepcid  Discontinued: fluoxetine, birth control, Miralax  Changed: N/A    Patient was asked about OTC/herbal products specifically.  PTA med list reflects this.    In the past week, patient estimated taking medication this percent of the time:  greater than 90%.    Allergies were reviewed, assessed, and updated with the patient.      Patient does not use any multi-dose medications prior to admission.    The information provided in this note is only as accurate as the sources available at the time of the update(s).    Thank you for the opportunity to participate in the care of this patient.    José Yu [facility]  3/4/2022 3:09 PM

## 2022-03-04 NOTE — ED NOTES
"ED Triage Provider Note  [facility]  Encounter Date: Mar 4, 2022    History:  Chief Complaint   Patient presents with     Shortness of Breath     Gabriela Eason is a 26 year old female who presents to the ED with shortness of breath with pain on right side.  She has had multiple spontaneous pneumothoraces and this does feel similar.  No cough, chills or fever.  No other complaint.    Review of Systems:  Review of Systems   Constitutional: Negative for fever.   Respiratory: Positive for shortness of breath. Negative for cough.    Cardiovascular: Positive for chest pain.   Neurological: Negative for syncope.         Exam:  /67   Pulse 103   Temp 98.1  F (36.7  C) (Oral)   Resp 20   Ht 1.702 m (5' 7\")   Wt 54 kg (119 lb)   SpO2 99%   BMI 18.64 kg/m      Physical Exam  Pulmonary:      Effort: Pulmonary effort is normal. No tachypnea.      Breath sounds: No decreased breath sounds.   Musculoskeletal:      Right lower leg: No edema.      Left lower leg: No edema.   Neurological:      Mental Status: She is alert.   Psychiatric:         Mood and Affect: Mood normal.         Medical Decision Making:  Patient arriving to the ED with problem as above. A medical screening exam was performed.  I saw the patient initially in triage and she has good breath sounds bilaterally and is not in any distress.  Normal vital signs.  She will be sent for an expedited chest x-ray now.              Kalpesh Lim MD on 3/4/2022 at 12:41 PM      Lab/Imaging Results:       Interventions:  Medications - No data to display       Kalpesh Lim MD  03/04/22 1243    "

## 2022-03-04 NOTE — CONSULTS
Pulmonary Service Consult Note  Date of Service: 03/04/2022    Reason for Consultation: PTX    History:     HPI: Patient is a pleasant 26-year-old female with past medical significant for bilateral pneumothoraces.  She initially had her pneumothorax back in 2017.  At that time she was evaluated by Dr. Hernandez.  She underwent right VATS with mechanical pleurodesis and apical pleurectomy through subxiphoid approach. She also had another pneumothorax, this time on the left side, back in 2020.  Patient was seen by Dr. Medina and underwent thoracoscopy, left lung wedge resection and pleurodesis.  This time, she was admitted with chest pain and shortness of breath.  This happened while patient was pushing another patient on a wheelchair. She works as a home health aid. In the ED, she was found to have a small PTX. She was then admitted for further evaluation.     Patient notes that she quit smoking back in January 2022.  She states that she smokes occasionally.  She denies smoking any illicit drugs.  She denies any excessive sports.  She is not sure if she has family history of pneumothoraces.  She denies any relation of her pneumothorax to her menstrual cycle (catamennial).      PMHx/PSHx:  Past Medical History:   Diagnosis Date     Acute respiratory failure (H) 3/24/2017     PONV (postoperative nausea and vomiting)      Status post thoracostomy tube placement (H) 3/24/2017     Past Surgical History:   Procedure Laterality Date     THORACOSCOPY Right 3/23/2017    Procedure: RIGHT VIDEO ASSISTED THORACOSCOPY WITH MECHANICAL PLEURODESIS AND APICAL PLEURECTOMY THROUGH A SUBXYPHIOD APPROACH;  Surgeon: Ricky Hernandez MD;  Location: [facility];  Service:      [facility] THORACOSCOPY,DX NO BX Left 3/16/2020    Procedure: THORACOSCOPY, LEFT LUNG WEDGE RESECTION, PLEURODESIS, CHEST TUBE PLACEMENT;  Surgeon: Dax Arias MD;  Location: [facility];  Service: General     Social history:  Quit January first of  "2022,  Work home health aid    Review of Systems - 10 point review of system negative except for what is mentioned in the HPI.    Exam/Data:   BP 96/52 (BP Location: Left arm)   Pulse 78   Temp 98.2  F (36.8  C) (Oral)   Resp 17   Ht 1.702 m (5' 7\")   Wt 54.4 kg (120 lb)   SpO2 100%   BMI 18.79 kg/m      No intake/output data recorded.         GEN: comfortable, NAD  HEENT: NCAT, EMOI, mmm  CVS: S1S2, RRR  Lung: good air entry bilaterally  Abd: Soft, NT, ND,  + BS.   Ext: no c/c/e  Neuro: nonfocal  Skin: no visible rash  Musculoskeletal: FROM all extremities  Psych: appropriate    DATA    Labs: Reviewed in EMR and outside records where pertinent.     IMAGING: Personally reviewed images. Formal radiology interpretation noted below.  Chest XR,  PA & LAT    Addendum Date: 3/4/2022    Addendum: Prior measurement 0.6 cm and not 2.6 cm.    Result Date: 3/4/2022  EXAM: XR CHEST 2 VW LOCATION: [facility] DATE/TIME: 3/4/2022 12:41 PM INDICATION: Shortness of breath, history of spontaneous pneumothorax. COMPARISON: 01/27/2022     IMPRESSION: Postsurgical changes in the right apex. There is a small right pneumothorax present with the air-filled right apical pleural space measuring 1.2 cm in comparison to 2.6 cm on the prior exam. Pneumothorax has increased in size.          Assessment/Plan:   Gabriela Eason is a 26 year old female with history of right-sided pneumothorax 2017, left-sided pneumothorax 2020, and now presenting again with right sided pneumothorax.  Both her pneumothoraces back in 2017 and 2020 required surgical interventions and has undergone pleurodesis.  On review of images from 1/2022, there is a small subtle pneumothorax on the right.  Current chest x-ray shows air-filled right apical pleural space measuring 1.2 cm, that has enlarged compared to previous chest x-ray (elevated report stated was 0.6 cm previously).    Recommendations:  Oxygen to maintain sats above 96%  Repeat CXR " now. Last CXR done around 1 pm.  Repeat CXR in am  Will ask Dr Arias to see given recurrence after pleurodesis  May need a CT Chest    TTS greater than 40 min, more than 50% on counseling and coordination of care    I appreciate the opportunity to participate in the care of Gabriela Eason.  Please feel free to contact me at any time.    Deedee Weaver MD  Pulmonary and Critical Care Medicine      History reviewed. No pertinent family history.  No Known Allergies    Medications:     No current outpatient medications on file.       Much or all of the text in this note was generated through the use of the Dragon Dictate voice-to-text software. Errors in spelling or words which seem out of context are unintentional. Sound alike errors, in particular, may have escaped editing.

## 2022-03-04 NOTE — H&P
[facility]    History and Physical - Hospitalist Service       Date of Admission:  3/4/2022    Assessment & Plan      Gabriela Eason is a 26 year old female with 12-week gestation and history of recurrent pneumothorax admitted on 3/4/2022 due to recurrent pneumothorax. She underwent right video-assisted thoracoscopic surgery with pleurodesis and pleurectomy for right-sided pneumothorax in 2017 and thoracoscopy, left lung resection and pleurodesis for left-sided pneumothorax in 2020. Pulmonologist recommended supplemental oxygen to maintain oxygen saturation above 96%, serial chest x-ray, and evaluation by surgery service.  Active Hospital problem  Recurrent pneumothorax  She underwent right video-assisted thoracoscopic surgery with pleurodesis and pleurectomy for right-sided pneumothorax in 2017 and thoracoscopy, left lung resection and pleurodesis for left-sided pneumothorax in 2020. Pulmonologist recommended supplemental oxygen to maintain oxygen saturation above 96%, and serial chest x-ray, with plan to consider chest CT     Supplemental oxygen  Serial chest x-ray  Pulse oximetry  Pulmonology [id number] assistance  Follow-up surgery consult    12 week gestation  Prenatal vitamins  Outpatient GYN/OBS follow up       Diet: Regular Diet Adult  DVT Prophylaxis: Pneumatic Compression Devices  Cho Catheter: Not present  Central Lines: None  Cardiac Monitoring: None  Code Status: Full Code    Clinically Significant Risk Factors Present on Admission                     Disposition Plan   Expected Discharge: 03/06/2022   Anticipated discharge location:  Awaiting care coordination huddle  Delays:    Serial chest x-ray, pulmonology recommendation,?  Need for surgical intervention       The patient's care was discussed with the Patient and boyfriend who was present at bedside    Rodrigo Blanco MD  Hospitalist Service  [facility]  Securely message with the  [employer] Web Console (learn more here)  Text page via [facility] Paging/Directory         ______________________________________________________________________    Chief Complaint   Shortness of breath     History is obtained from the patient    History of Present Illness   Gabriela Eason is a 26 year old female with 12-week gestation and history of recurrent pneumothorax, who presented to the ER due to shortness of breath.    She was in her usual state of health until this morning when she developed shortness of breath while assisting someone in wheelchair.  She sat down for about 2 hours after the onset of symptom, however, shortness of breath persisted.  She underwent right video-assisted thoracoscopic surgery with pleurodesis and pleurectomy for right-sided pneumothorax in 2017 and thoracoscopy, left lung resection and pleurodesis for left-sided pneumothorax in 2020.  She had another episode on the right side about 2 months ago that was managed conservatively.  She denies chest pain, fever, cough, chills, or upper respiratory tract symptoms.  She denies abdominal pain, diarrhea, nausea or vomiting.  She is 12 weeks pregnant.  She is still smoking about 2 months ago.  No history of recreational drug use.    In the ER, blood pressure was 104/63, pulse 68, respiratory 19, temperature 98.1  F and oxygen saturation 100% on room air.  Chest x-ray showed postsurgical changes in the right apex with small right pneumothorax present with the air-filled right apical pleural space measuring 1.2 cm in comparison to 2.6 cm on the prior exam.  Pulmonologist recommended supplemental oxygen to maintain oxygen saturation above 96%, and serial chest x-ray, with plan to consider chest CT and discussed with surgeon, Dr Arias to see given recurrence after pleurodesis        Review of Systems    The 10 point Review of Systems is negative other than noted in the HPI or here.     Past Medical History    I have reviewed this patient's medical  history and updated it with pertinent information if needed.   Past Medical History:   Diagnosis Date     Acute respiratory failure (H) 3/24/2017     PONV (postoperative nausea and vomiting)      Status post thoracostomy tube placement (H) 3/24/2017       Past Surgical History   I have reviewed this patient's surgical history and updated it with pertinent information if needed.  Past Surgical History:   Procedure Laterality Date     THORACOSCOPY Right 3/23/2017    Procedure: RIGHT VIDEO ASSISTED THORACOSCOPY WITH MECHANICAL PLEURODESIS AND APICAL PLEURECTOMY THROUGH A SUBXYPHIOD APPROACH;  Surgeon: Ricky Hernandez MD;  Location: [facility];  Service:      [facility] THORACOSCOPY,DX NO BX Left 3/16/2020    Procedure: THORACOSCOPY, LEFT LUNG WEDGE RESECTION, PLEURODESIS, CHEST TUBE PLACEMENT;  Surgeon: Dax Arias MD;  Location: [facility];  Service: General       Social History   I have reviewed this patient's social history and updated it with pertinent information if needed.  Social History     Tobacco Use     Smoking status: Former Smoker     Smokeless tobacco: Never Used   Substance Use Topics     Alcohol use: No     Drug use: No       Family History     DVT both parents    Prior to Admission Medications   Prior to Admission Medications   Prescriptions Last Dose Informant Patient Reported? Taking?   famotidine (PEPCID) 40 MG tablet 3/3/2022 at PM  Yes Yes   Sig: Take 40 mg by mouth At Bedtime      Facility-Administered Medications: None     Allergies   No Known Allergies    Physical Exam   Vital Signs: Temp: 98.2  F (36.8  C) Temp src: Oral BP: 96/52 Pulse: 80   Resp: 18 SpO2: 100 % O2 Device: Nasal cannula Oxygen Delivery: 10 LPM  Weight: 120 lbs 0 oz    Constitutional: awake, alert, cooperative, no apparent distress, and appears stated age  Eyes: Lids and lashes normal, pupils equal, round and reactive to light, extra ocular muscles intact, sclera clear, conjunctiva normal  ENT:  Normocephalic, without obvious abnormality, atraumatic, sinuses nontender on palpation, external ears without lesions, oral pharynx with moist mucous membranes, tonsils without erythema or exudates, gums normal and good dentition.  Respiratory: No increased work of breathing, good air exchange, clear to auscultation bilaterally, no crackles or wheezing  Cardiovascular: Normal apical impulse, regular rate and rhythm, normal S1 and S2, no S3 or S4, and no murmur noted  GI: No scars, normal bowel sounds, soft, non-distended, non-tender, no masses palpated, no hepatosplenomegally  Skin: no bruising or bleeding  Musculoskeletal: There is no redness, warmth, or swelling of the joints.  Full range of motion noted.  Motor strength is 5 out of 5 all extremities bilaterally.  Tone is normal.  Neurologic: Awake, alert, oriented to name, place and time.  Cranial nerves II-XII are grossly intact.  Motor is 5 out of 5 bilaterally.  Cerebellar finger to nose, heel to shin intact.  Sensory is intact.     Neuropsychiatric: General: normal, calm and normal eye contact  Level of consciousness: alert / normal  Affect: normal    Data   Data reviewed today: I reviewed all medications, new labs and imaging results over the last 24 hours. I personally reviewed the chest x-ray image(s) showing pneumothorax.    Recent Results (from the past 24 hour(s))   Chest XR,  PA & LAT    Addendum: 3/4/2022    Addendum: Prior measurement 0.6 cm and not 2.6 cm.      Narrative    EXAM: XR CHEST 2 VW  LOCATION: [facility]  DATE/TIME: 3/4/2022 12:41 PM    INDICATION: Shortness of breath, history of spontaneous pneumothorax.  COMPARISON: 01/27/2022      Impression    IMPRESSION: Postsurgical changes in the right apex. There is a small right pneumothorax present with the air-filled right apical pleural space measuring 1.2 cm in comparison to 2.6 cm on the prior exam. Pneumothorax has increased in size.

## 2022-03-04 NOTE — ED TRIAGE NOTES
Pt states shortness of breath that started this morning at 1040. Pt has history of spontaneous pneumothorax.

## 2022-03-04 NOTE — ED NOTES
"Telephone report also given to IRWIN Duque.      [facility] ED Handoff Report    ED Chief Complaint: SOB, CP    ED Diagnosis:  (J93.9) Pneumothorax on right  Comment: hx of  Plan:        PMH:    Past Medical History:   Diagnosis Date     Acute respiratory failure (H) 3/24/2017     PONV (postoperative nausea and vomiting)      Status post thoracostomy tube placement (H) 3/24/2017        Code Status:  No Order     Falls Risk: No Band: Not applicable    Current Living Situation/Residence: lives with a significant other     Elimination Status: Continent: Yes     Activity Level: Independent/ SBA    Patients Preferred Language:  English     Needed: No    Vital Signs:  /61   Pulse 75   Temp 98.1  F (36.7  C) (Oral)   Resp 19   Ht 1.702 m (5' 7\")   Wt 54 kg (119 lb)   SpO2 100%   BMI 18.64 kg/m       Cardiac Rhythm: NSR    Pain Score: 5/10    Is the Patient Confused:  No    Last Food or Drink: 03/04/22 at unknown, OK to eat, menu provided    Focused Assessment:  LS clear/dim, pain level tolerable, nausea relates to pregnancy    Tests Performed: Done: Imaging, Saline Lock, O2    Treatments Provided:  10 L NRB Mask placed by provider, COVID swab pending    Family Dynamics/Concerns: No- boyfriend at bedside    Family Updated On Visitor Policy: Yes    Plan of Care Communicated to Family: Yes    Who Was Updated about Plan of Care: boyfriend    Belongings Checklist Done and Signed by Patient: Yes    Medications sent with patient: n/a    Covid: symptomatic, pending, pneumothorax, afebrile    Additional Information: please call with questions, thanks!     RN: Mireya Reynolds   3/4/2022 4:41 PM     "

## 2022-03-04 NOTE — CONSULTS
"Care Management Initial Consult    General Information  Assessment completed with: Patient, Spouse or significant other, Gabriela and boyfriend Howard  Type of CM/SW Visit: Initial Assessment    Primary Care Provider verified and updated as needed: Yes   Readmission within the last 30 days: no previous admission in last 30 days      Reason for Consult: discharge planning  Advance Care Planning: Advance Care Planning Reviewed: other (see comments) (\"I don't have one\")          Communication Assessment  Patient's communication style: spoken language (English or Bilingual)    Hearing Difficulty or Deaf: no   Wear Glasses or Blind: no    Cognitive  Cognitive/Neuro/Behavioral:                        Living Environment:   People in home: significant other  Howard  Current living Arrangements: apartment      Able to return to prior arrangements: yes       Family/Social Support:  Care provided by: self  Provides care for: no one  Marital Status: Lives with Significant Other  Significant Other, Parent(s)       Howard  Description of Support System: Supportive, Involved    Support Assessment: Adequate social supports, Adequate family and caregiver support, Patient communicates needs well met    Current Resources:   Patient receiving home care services: No     Community Resources: None  Equipment currently used at home: none  Supplies currently used at home: None    Employment/Financial:  Employment Status: employed full-time     Employment/ Comments: \"no  benefits\"  Financial Concerns:             Lifestyle & Psychosocial Needs:  Social Determinants of Health     Tobacco Use: Medium Risk     Smoking Tobacco Use: Former Smoker     Smokeless Tobacco Use: Never Used   Alcohol Use: Not on file   Financial Resource Strain: Not on file   Food Insecurity: Not on file   Transportation Needs: Not on file   Physical Activity: Not on file   Stress: Not on file   Social Connections: Not on file   Intimate Partner " Violence: Not on file   Depression: Not on file   Housing Stability: Not on file       Functional Status:  Prior to admission patient needed assistance:   Dependent ADLs:: Independent, Ambulation-no assistive device  Dependent IADLs:: Independent       Mental Health Status:          Chemical Dependency Status:                Values/Beliefs:  Spiritual, Cultural Beliefs, Nondenominational Practices, Values that affect care:                 Additional Information:  Gabriela lives in an apartment with her boyfriend. She is independent with ADLs at baseline and drives and works full time.    Likely no discharge needs at this time.    Family to transport at discharge.    Zoila Delgado RN

## 2022-03-05 ENCOUNTER — APPOINTMENT (OUTPATIENT)
Dept: CT IMAGING | Facility: HOSPITAL | Age: 27
End: 2022-03-05
Attending: SURGERY
Payer: COMMERCIAL

## 2022-03-05 ENCOUNTER — APPOINTMENT (OUTPATIENT)
Dept: RADIOLOGY | Facility: HOSPITAL | Age: 27
End: 2022-03-05
Attending: INTERNAL MEDICINE
Payer: COMMERCIAL

## 2022-03-05 LAB
ANION GAP SERPL CALCULATED.3IONS-SCNC: 16 MMOL/L (ref 5–18)
BUN SERPL-MCNC: 7 MG/DL (ref 8–22)
CALCIUM SERPL-MCNC: 7.7 MG/DL (ref 8.5–10.5)
CHLORIDE BLD-SCNC: 106 MMOL/L (ref 98–107)
CO2 SERPL-SCNC: 16 MMOL/L (ref 22–31)
CREAT SERPL-MCNC: 0.5 MG/DL (ref 0.6–1.1)
GFR SERPL CREATININE-BSD FRML MDRD: >90 ML/MIN/1.73M2
GLUCOSE BLD-MCNC: 91 MG/DL (ref 70–125)
MAGNESIUM SERPL-MCNC: 1.2 MG/DL (ref 1.8–2.6)
POTASSIUM BLD-SCNC: 3.7 MMOL/L (ref 3.5–5)
SODIUM SERPL-SCNC: 138 MMOL/L (ref 136–145)

## 2022-03-05 PROCEDURE — 250N000011 HC RX IP 250 OP 636: Performed by: FAMILY MEDICINE

## 2022-03-05 PROCEDURE — G0378 HOSPITAL OBSERVATION PER HR: HCPCS

## 2022-03-05 PROCEDURE — 99207 PR CDG-CODE CATEGORY CHANGED: CPT | Performed by: FAMILY MEDICINE

## 2022-03-05 PROCEDURE — 80048 BASIC METABOLIC PNL TOTAL CA: CPT | Performed by: FAMILY MEDICINE

## 2022-03-05 PROCEDURE — 96376 TX/PRO/DX INJ SAME DRUG ADON: CPT

## 2022-03-05 PROCEDURE — 71260 CT THORAX DX C+: CPT

## 2022-03-05 PROCEDURE — 96375 TX/PRO/DX INJ NEW DRUG ADDON: CPT | Mod: 59

## 2022-03-05 PROCEDURE — 96365 THER/PROPH/DIAG IV INF INIT: CPT | Mod: 59

## 2022-03-05 PROCEDURE — 250N000013 HC RX MED GY IP 250 OP 250 PS 637

## 2022-03-05 PROCEDURE — 258N000003 HC RX IP 258 OP 636

## 2022-03-05 PROCEDURE — 999N000127 HC STATISTIC PERIPHERAL IV START W US GUIDANCE

## 2022-03-05 PROCEDURE — 83735 ASSAY OF MAGNESIUM: CPT | Performed by: FAMILY MEDICINE

## 2022-03-05 PROCEDURE — 99226 PR SUBSEQUENT OBSERVATION CARE,LEVEL III: CPT | Performed by: FAMILY MEDICINE

## 2022-03-05 PROCEDURE — 250N000013 HC RX MED GY IP 250 OP 250 PS 637: Performed by: FAMILY MEDICINE

## 2022-03-05 PROCEDURE — 96366 THER/PROPH/DIAG IV INF ADDON: CPT

## 2022-03-05 PROCEDURE — 71046 X-RAY EXAM CHEST 2 VIEWS: CPT

## 2022-03-05 PROCEDURE — 258N000003 HC RX IP 258 OP 636: Performed by: FAMILY MEDICINE

## 2022-03-05 PROCEDURE — 36415 COLL VENOUS BLD VENIPUNCTURE: CPT | Performed by: FAMILY MEDICINE

## 2022-03-05 PROCEDURE — 99207 PR CDG-CODE CATEGORY CHANGED: CPT | Performed by: INTERNAL MEDICINE

## 2022-03-05 PROCEDURE — 99214 OFFICE O/P EST MOD 30 MIN: CPT | Performed by: INTERNAL MEDICINE

## 2022-03-05 RX ORDER — PYRIDOXINE HCL (VITAMIN B6) 25 MG
25 TABLET ORAL ONCE
Status: COMPLETED | OUTPATIENT
Start: 2022-03-05 | End: 2022-03-05

## 2022-03-05 RX ORDER — MAGNESIUM SULFATE HEPTAHYDRATE 40 MG/ML
2 INJECTION, SOLUTION INTRAVENOUS ONCE
Status: DISCONTINUED | OUTPATIENT
Start: 2022-03-05 | End: 2022-03-05

## 2022-03-05 RX ORDER — ACETAMINOPHEN 325 MG/1
650 TABLET ORAL EVERY 4 HOURS PRN
Status: DISCONTINUED | OUTPATIENT
Start: 2022-03-05 | End: 2022-03-06 | Stop reason: HOSPADM

## 2022-03-05 RX ORDER — ONDANSETRON 2 MG/ML
4 INJECTION INTRAMUSCULAR; INTRAVENOUS EVERY 6 HOURS PRN
Status: DISCONTINUED | OUTPATIENT
Start: 2022-03-05 | End: 2022-03-06 | Stop reason: HOSPADM

## 2022-03-05 RX ORDER — MAGNESIUM SULFATE 4 G/50ML
4 INJECTION INTRAVENOUS ONCE
Status: COMPLETED | OUTPATIENT
Start: 2022-03-05 | End: 2022-03-05

## 2022-03-05 RX ORDER — PROCHLORPERAZINE 25 MG
25 SUPPOSITORY, RECTAL RECTAL EVERY 12 HOURS PRN
Status: DISCONTINUED | OUTPATIENT
Start: 2022-03-05 | End: 2022-03-06 | Stop reason: HOSPADM

## 2022-03-05 RX ORDER — FAMOTIDINE 20 MG/1
40 TABLET, FILM COATED ORAL ONCE
Status: COMPLETED | OUTPATIENT
Start: 2022-03-05 | End: 2022-03-05

## 2022-03-05 RX ORDER — DEXTROSE MONOHYDRATE, SODIUM CHLORIDE, SODIUM LACTATE, POTASSIUM CHLORIDE, CALCIUM CHLORIDE 5; 600; 310; 179; 20 G/100ML; MG/100ML; MG/100ML; MG/100ML; MG/100ML
INJECTION, SOLUTION INTRAVENOUS CONTINUOUS
Status: DISCONTINUED | OUTPATIENT
Start: 2022-03-05 | End: 2022-03-05

## 2022-03-05 RX ORDER — PROCHLORPERAZINE MALEATE 10 MG
10 TABLET ORAL EVERY 6 HOURS PRN
Status: DISCONTINUED | OUTPATIENT
Start: 2022-03-05 | End: 2022-03-06 | Stop reason: HOSPADM

## 2022-03-05 RX ORDER — FAMOTIDINE 10 MG
10 TABLET ORAL 2 TIMES DAILY
Status: DISCONTINUED | OUTPATIENT
Start: 2022-03-05 | End: 2022-03-06 | Stop reason: HOSPADM

## 2022-03-05 RX ORDER — SODIUM CHLORIDE, SODIUM LACTATE, POTASSIUM CHLORIDE, CALCIUM CHLORIDE 600; 310; 30; 20 MG/100ML; MG/100ML; MG/100ML; MG/100ML
INJECTION, SOLUTION INTRAVENOUS CONTINUOUS
Status: DISCONTINUED | OUTPATIENT
Start: 2022-03-05 | End: 2022-03-06

## 2022-03-05 RX ORDER — SODIUM CHLORIDE 9 MG/ML
INJECTION, SOLUTION INTRAVENOUS CONTINUOUS
Status: DISCONTINUED | OUTPATIENT
Start: 2022-03-05 | End: 2022-03-05

## 2022-03-05 RX ORDER — IOPAMIDOL 755 MG/ML
100 INJECTION, SOLUTION INTRAVASCULAR ONCE
Status: COMPLETED | OUTPATIENT
Start: 2022-03-05 | End: 2022-03-05

## 2022-03-05 RX ADMIN — ONDANSETRON 4 MG: 2 INJECTION INTRAMUSCULAR; INTRAVENOUS at 16:01

## 2022-03-05 RX ADMIN — ONDANSETRON 4 MG: 2 INJECTION INTRAMUSCULAR; INTRAVENOUS at 08:46

## 2022-03-05 RX ADMIN — FAMOTIDINE 40 MG: 20 TABLET, FILM COATED ORAL at 02:24

## 2022-03-05 RX ADMIN — SODIUM CHLORIDE, POTASSIUM CHLORIDE, SODIUM LACTATE AND CALCIUM CHLORIDE: 600; 310; 30; 20 INJECTION, SOLUTION INTRAVENOUS at 14:18

## 2022-03-05 RX ADMIN — IOPAMIDOL 90 ML: 755 INJECTION, SOLUTION INTRAVENOUS at 14:55

## 2022-03-05 RX ADMIN — SODIUM CHLORIDE: 9 INJECTION, SOLUTION INTRAVENOUS at 08:46

## 2022-03-05 RX ADMIN — MAGNESIUM SULFATE HEPTAHYDRATE 4 G: 80 INJECTION, SOLUTION INTRAVENOUS at 13:40

## 2022-03-05 RX ADMIN — PROCHLORPERAZINE EDISYLATE 10 MG: 5 INJECTION INTRAMUSCULAR; INTRAVENOUS at 17:34

## 2022-03-05 RX ADMIN — Medication 25 MG: at 05:07

## 2022-03-05 NOTE — PROGRESS NOTES
CT unable to completed the scan until the ordering provider specifically reviews and discusses the risks vs benefits of the scan while being pregnant.  Dr Ruelas (on-call provider for surgery group) returned page and verified that patient is stable at this time.  Pt is stable and it was advised to have Dr. Arias review the above with pt on 3/6/22 and proceed if pt agreeable.  Pt updated.

## 2022-03-05 NOTE — PROGRESS NOTES
[facility]    Medicine Progress Note - Hospitalist Service       Date of Admission:  3/4/2022  Active Problems:    Pneumothorax on right     Assessment & Plan            26-year-old female pregnant at 12 weeks gestational age, history of recurrent bilateral pneumothoraces admitted for shortness of breath and right chest pain found to have a right pneumothorax.  Patient is being followed by pulmonology and surgery    Right pneumothorax  Has history of right VATS with mechanical pleurodesis and apical pleurectomy through the subxiphoid approach in 2017.  Also has a history of a left pneumothorax with fluoroscopy thoracoscopy, left wedge lung resection and pleurodesis in 2020.  Chest x-ray shows a small right pneumothorax  Will having palpable pain on the right chest, no shortness of breath, not hypoxic  Seen by pulmonology and general surgery, unable to tolerate nasal cannula or facemask.  General surgery recommending CT of chest  Start I-S.    Nausea and vomiting with diarrhea  Unremarkable abdominal exam.  Resolved.  No melena or hematochezia.  Normal WBC.  Monitor stools, tolerating regular diet now  Nausea and vomiting could be related to pregnancy, will monitor diarrhea  Zofran/Compazine/Pepcid for nausea and vomiting which seems to be resolved  Discussed with on-call OB    Hypomagnesia-replace with magnesium sulfate    Hypokalemia-replaced, monitor    12 week pregnancy-  Follows with Dr. Betzy Adams.  Outpatient follow-up     Diet: Regular Diet Adult    DVT Prophylaxis: Pneumatic Compression Devices  Cho Catheter: Not present  Central Lines: None  Code Status: Full Code      Disposition Plan   Expected Discharge: Depending on resolution of pneumothorax and symptoms versus requiring chest tube     The patient's care was discussed with the Bedside Nurse, Care Coordinator/, Patient and Surgery, Consultant. for total time 35 minutes with greater than 50% of total time spent in  "counseling and coordination of care.    PHU PHILLIPS MD  Hospitalist Service  [facility]  Securely message with the [employer] Web Console (learn more here)  Text page via [employer] Paging/Directory        Clinically Significant Risk Factors Present on Admission           # Hypomagnesemia: Mg = 1.2 mg/dL (Ref range: 1.8 - 2.6 mg/dL) on admission, will replace as needed  # Anion Gap Metabolic Acidosis: AG = 16 mmol/L (Ref range: 5 - 18 mmol/L) on admission, will monitor and treat as appropriate          ______________________________________________________________________    Interval History   Remainder of 12 point review of systems negative except as noted below    Subjective:  Patient feeling better.  Had episode of diarrhea yesterday, resolved, no melena or hematochezia.  No abdominal pain.  Also had an episode of nausea vomiting, tolerating regular food now.  No fevers or chills.  Has right mid chest pain, worse with palpation.  No shortness of breath currently.  Unable to tolerate nasal cannula or facemask oxygen.    Data reviewed today: I reviewed all medications, new labs and imaging results over the last 24 hours.     Physical Exam   Vital Signs: Temp: 98.3  F (36.8  C) Temp src: Oral BP: 108/58 Pulse: 103   Resp: 18 SpO2: 96 % O2 Device: None (Room air) Oxygen Delivery: 10 LPM  Weight: 120 lbs 0 oz  Physical Exam:  Temp:  [98.2  F (36.8  C)-98.6  F (37  C)] 98.3  F (36.8  C)  Pulse:  [] 103  Resp:  [16-22] 18  BP: ()/(52-68) 108/58  FiO2 (%):  [6 %] 6 %  SpO2:  [96 %-100 %] 96 %    /58 (BP Location: Left arm)   Pulse 103   Temp 98.3  F (36.8  C) (Oral)   Resp 18   Ht 1.702 m (5' 7\")   Wt 54.4 kg (120 lb)   SpO2 96%   BMI 18.79 kg/m    General appearance: alert, appears stated age and cooperative  Head: Normocephalic, without obvious abnormality, atraumatic  Eyes: Clear conjuctiva  Neck: no JVD and supple, symmetrical, trachea midline  Lungs: clear to auscultation " bilaterally  Heart: regular rate and rhythm, S1, S2 normal, no murmur, click, rub or gallop  Abdomen: soft, non-tender; bowel sounds normal; no masses,  no organomegaly  Extremities: Negative homans,   Skin: Skin color, texture, turgor normal. No rashes or lesions  Neurologic: Mental status: Alert, oriented, thought content appropriate  Cranial nerves: normal  Sensory: normal  Motor:grossly normal  Tender to palpation in the right chest wall no rash        Data   Recent Labs   Lab 03/05/22  1212 03/04/22  2241 03/04/22  1829   WBC  --   --  7.6   HGB  --   --  12.3   MCV  --   --  91   PLT  --   --  178     --  135*   POTASSIUM 3.7 3.6 3.3*   CHLORIDE 106  --  104   CO2 16*  --  21*   BUN 7*  --  7*   CR 0.50*  --  0.64   ANIONGAP 16  --  10   JENNY 7.7*  --  8.8   GLC 91  --  127*     Recent Results (from the past 24 hour(s))   XR Chest 1 View    Narrative    EXAM: XR CHEST 1 VIEW  LOCATION: [facility]  DATE/TIME: 3/4/2022 5:59 PM    INDICATION: Right pneumothorax. Follow-up.  COMPARISON: 03/04/2022 12:41 PM      Impression    IMPRESSION: Trace right apical pneumothorax is decreased in volume with 7 mm craniocaudal separation between the visceral and parietal pleura previously 12 mm. Biapical wedge resection suture lines.   XR Chest 2 Views    Narrative    EXAM: XR CHEST 2 VIEW  LOCATION: [facility]  DATE/TIME: 03/05/2022, 7:49 AM    INDICATION: History of pneumothorax.  COMPARISON: 03/04/2022.      Impression    IMPRESSION: There is a tiny right apical pneumothorax, minimally decreased from comparison. Lungs and pleural spaces are clear. Heart and mediastinal size are normal.

## 2022-03-05 NOTE — CONSULTS
GENERAL SURGERY CONSULTATION    Gabriela Eason  [place]  Medical Record #:  [id number]  YOB: 1995  Age:  26 year old     Date of Consultation: 3/5/2022    Reason for Consultation: Recurrent spontaneous right pneumothorax    Gabriela Eason is a 26 year old female who presents with a history of bilateral pneumothoraces.  Patient had distant right pneumothorax treatment at [facility] in the past and subsequently I took care of a left pneumothorax with wedge resection and pleurodesis approximately 2 years ago.  At the current time she is seen in general care lombardi setting.  She has a recurrence on the right side which is very small per chest x-ray.  She had onset of pain yesterday.  It is of note that she is approximately 13 weeks pregnant.  She has mild discomfort currently.  No other interval major medical illnesses.  She has had some loose stool in the 24 hours recently.    PHH:    Past Medical History:   Diagnosis Date     Acute respiratory failure (H) 3/24/2017     PONV (postoperative nausea and vomiting)      Status post thoracostomy tube placement (H) 3/24/2017        Past Surgical History:   Procedure Laterality Date     THORACOSCOPY Right 3/23/2017    Procedure: RIGHT VIDEO ASSISTED THORACOSCOPY WITH MECHANICAL PLEURODESIS AND APICAL PLEURECTOMY THROUGH A SUBXYPHIOD APPROACH;  Surgeon: Ricky Hernandez MD;  Location: [facility];  Service:      [facility] THORACOSCOPY,DX NO BX Left 3/16/2020    Procedure: THORACOSCOPY, LEFT LUNG WEDGE RESECTION, PLEURODESIS, CHEST TUBE PLACEMENT;  Surgeon: Dax Arias MD;  Location: [facility];  Service: General       ALLERGIES:  Patient has no known allergies.    MEDS:    Current Facility-Administered Medications:      acetaminophen (TYLENOL) tablet 650 mg, 650 mg, Oral, Q4H PRN, Rafi Beard MD     lidocaine (LMX4) cream, , Topical, Q1H PRN, Rodrigo Blanco MD     lidocaine 1 % 0.1-1 mL, 0.1-1 mL, Other, Q1H PRN,  "Rodrigo Blanco MD     ondansetron (ZOFRAN) injection 4 mg, 4 mg, Intravenous, Q6H PRN, Rafi Beard MD, 4 mg at 03/05/22 0846     prenatal multivitamin w/iron per tablet 1 tablet, 1 tablet, Oral, Daily, Rodrigo Blanco MD, 1 tablet at 03/04/22 2020     sodium chloride (PF) 0.9% PF flush 3 mL, 3 mL, Intracatheter, Q8H, Rodrigo Blanco MD, 3 mL at 03/04/22 2020     sodium chloride (PF) 0.9% PF flush 3 mL, 3 mL, Intracatheter, q1 min prn, Rodrigo Blanco MD     sodium chloride 0.9% infusion, , Intravenous, Continuous, Salvador Hollis MD, Last Rate: 125 mL/hr at 03/05/22 0846, New Bag at 03/05/22 0846    SOCIAL HABITS:    History   Smoking Status     Former Smoker   Smokeless Tobacco     Never Used     Social History    Substance and Sexual Activity      Alcohol use: No      History   Drug Use No       FAMILY HISTORY:  History reviewed. No pertinent family history.    REVIEW OF SYSTEMS: Noted and reviewed loose stool noted.  Pregnancy noted.    PE:    /68 (BP Location: Left arm)   Pulse (!) 129   Temp 98.2  F (36.8  C) (Oral)   Resp 18   Ht 1.702 m (5' 7\")   Wt 54.4 kg (120 lb)   SpO2 97%   BMI 18.79 kg/m      HEENT: Normal without subcutaneous air  Chest: Normal without subcutaneous air  Lungs: Clear  Heart: Heart rate 80/min regular  Abd:    Ext: Normal  Vascular:     LABS:    Recent Labs   Lab 03/04/22  1829   *   CO2 21*   BUN 7*      Recent Labs   Lab 03/04/22 1829   WBC 7.6   HGB 12.3   HCT 37.1       No results for input(s): ALKPHOS, BILITOT, ALT, AST in the last 168 hours.    Invalid input(s): BILIDIR, PROT, LABALBU  No results for input(s): AMYLASE in the last 168 hours.  No results for input(s): INR in the last 168 hours.    XRAYS: X-ray reviewed    ASSESSMENT: Recurrent pneumothorax right side.    PLAN: We will obtain CT scan verify the significance of the right pneumothorax.  Otherwise observation.  Reviewed with patient may not need surgical " intervention at this time unless she progresses a pneumothorax.    Dax ledesma md  [facility]

## 2022-03-05 NOTE — PROGRESS NOTES
Patient was seen by Pulmonologist and discussed risk vs benefit of CT scan while being pregnant.  Patient is agreeable to have CT scan.  CT aware and will call for patient when bed is ready.

## 2022-03-05 NOTE — PLAN OF CARE
PRIMARY DIAGNOSIS: Pneumothorax   OUTPATIENT/OBSERVATION GOALS TO BE MET BEFORE DISCHARGE:  ADLs back to baseline: No    Activity and level of assistance: Ambulating independently.    Pain status: Pain free.    Return to near baseline physical activity: No     Discharge Planner Nurse   Safe discharge environment identified:  Unknown  Barriers to discharge: Yes       Entered by: Angelika Luciano 03/05/2022 8:32 AM     Please review provider order for any additional goals.   Nurse to notify provider when observation goals have been met and patient is ready for discharge.Goal Outcome Evaluation:

## 2022-03-05 NOTE — PLAN OF CARE
PRIMARY DIAGNOSIS: Pneumothorax  OUTPATIENT/OBSERVATION GOALS TO BE MET BEFORE DISCHARGE:  ADLs back to baseline: No    Activity and level of assistance: Ambulating independently.    Pain status: Pain free.    Return to near baseline physical activity: No     Discharge Planner Nurse   Safe discharge environment identified: No  Barriers to discharge: Yes       Entered by: Dunia Carrasquillo 03/04/2022 8:06 PM     Please review provider order for any additional goals.   Nurse to notify provider when observation goals have been met and patient is ready for discharge.     Dunia Carrasquillo RN on 3/4/2022 at 8:07 PM

## 2022-03-05 NOTE — PLAN OF CARE
PRIMARY DIAGNOSIS: Pneumothorax  OUTPATIENT/OBSERVATION GOALS TO BE MET BEFORE DISCHARGE:  ADLs back to baseline: No    Activity and level of assistance: Ambulating independently.    Pain status: Pain free.    Return to near baseline physical activity: Yes     Discharge Planner Nurse   Safe discharge environment identified: Yes  Barriers to discharge: Yes       Entered by: Hailey Walters 03/05/2022 3:20 PM     Please review provider order for any additional goals.   Nurse to notify provider when observation goals have been met and patient is ready for discharge.Goal Outcome Evaluation:

## 2022-03-05 NOTE — PLAN OF CARE
PRIMARY DIAGNOSIS: Pneomothorax  OUTPATIENT/OBSERVATION GOALS TO BE MET BEFORE DISCHARGE:  ADLs back to baseline: Yes    Activity and level of assistance: Ambulating independently.    Pain status: Improved with use of alternative comfort measures i.e.: ice, positioning, and distraction from boyfriends visiting    Return to near baseline physical activity: No     Discharge Planner Nurse   Safe discharge environment identified: Yes  Barriers to discharge: Yes       Entered by: Hailey Walters 03/05/2022 12:03 PM     Please review provider order for any additional goals.   Nurse to notify provider when observation goals have been met and patient is ready for discharge.Goal Outcome Evaluation:        Pt is pleasant and cooperative.   IV placed by PICC RN.  IV fluids started, PRN zofran for nausea and vomiting.  Pt reports loose stools.  Not observed.  Will continue to monitor.

## 2022-03-05 NOTE — PLAN OF CARE
PRIMARY DIAGNOSIS: PNEUMOTHORAX  OUTPATIENT/OBSERVATION GOALS TO BE MET BEFORE DISCHARGE:  ADLs back to baseline: Yes    Activity and level of assistance: Ambulating independently.    Pain status: Pain free.    Return to near baseline physical activity: No     Discharge Planner Nurse   Safe discharge environment identified:  UNKNOWN  Barriers to discharge: Yes       Entered by: Angelika Luciano 03/05/2022 12:48 AM     Please review provider order for any additional goals.   Nurse to notify provider when observation goals have been met and patient is ready for discharge.Goal Outcome Evaluation:

## 2022-03-05 NOTE — CONSULTS
"Pulmonary Service Consult Note  Date of Service: 03/05/2022    Reason for Consultation: PTX    History:     HPI: Patient is a pleasant 26-year-old female with past medical significant for bilateral pneumothoraces.  She initially had her pneumothorax back in 2017.  At that time she was evaluated by Dr. Hernandez.  She underwent right VATS with mechanical pleurodesis and apical pleurectomy through subxiphoid approach. She also had another pneumothorax, this time on the left side, back in 2020.  Patient was seen by Dr. Medina and underwent thoracoscopy, left lung wedge resection and pleurodesis.  This time, she was admitted with chest pain and shortness of breath.  This happened while patient was pushing another patient on a wheelchair. She works as a home health aid. In the ED, she was found to have a small PTX. She was then admitted for further evaluation.     Patient notes that she quit smoking back in January 2022.  She states that she smokes occasionally.  She denies smoking any illicit drugs.  She denies any excessive sports.  She is not sure if she has family history of pneumothoraces.  She denies any relation of her pneumothorax to her menstrual cycle (catamennial).    Review of Systems - 10 point review of system negative except for what is mentioned in the HPI.    Exam/Data:   /58 (BP Location: Left arm)   Pulse 103   Temp 98.3  F (36.8  C) (Oral)   Resp 18   Ht 1.702 m (5' 7\")   Wt 54.4 kg (120 lb)   SpO2 96%   BMI 18.79 kg/m      02/28 0700 - 03/05 0659  In: 230 [P.O.:230]  Out: -   Net: 230         GEN: comfortable, NAD  HEENT: NCAT, EMOI, mmm  CVS: S1S2, RRR  Lung: good air entry bilaterally  Abd: Soft, NT, ND,  + BS.   Ext: no c/c/e  Neuro: nonfocal  Skin: no visible rash  Musculoskeletal: FROM all extremities  Psych: appropriate    DATA    Labs: Reviewed in EMR and outside records where pertinent.     IMAGING: Personally reviewed images. Formal radiology interpretation noted below.  Chest " XR,  PA & LAT    Addendum Date: 3/4/2022    Addendum: Prior measurement 0.6 cm and not 2.6 cm.    Result Date: 3/4/2022  EXAM: XR CHEST 2 VW LOCATION: [facility] DATE/TIME: 3/4/2022 12:41 PM INDICATION: Shortness of breath, history of spontaneous pneumothorax. COMPARISON: 01/27/2022     IMPRESSION: Postsurgical changes in the right apex. There is a small right pneumothorax present with the air-filled right apical pleural space measuring 1.2 cm in comparison to 2.6 cm on the prior exam. Pneumothorax has increased in size.        EXAM: XR CHEST 2 VIEW  LOCATION: [facility]  DATE/TIME: 03/05/2022, 7:49 AM     INDICATION: History of pneumothorax.  COMPARISON: 03/04/2022.                                                                      IMPRESSION: There is a tiny right apical pneumothorax, minimally decreased from comparison. Lungs and pleural spaces are clear. Heart and mediastinal size are normal.      Assessment/Plan:   Gabriela Eason is a 26 year old female with history of right-sided pneumothorax 2017, left-sided pneumothorax 2020, and now presenting again with right sided pneumothorax.  Both her pneumothoraces back in 2017 and 2020 required surgical interventions and has undergone pleurodesis.  On review of images from 1/2022, there is a small subtle pneumothorax on the right.  Current chest x-ray shows air-filled right apical pleural space measuring 1.2 cm, that has enlarged compared to previous chest x-ray (elevated report stated was 0.6 cm previously).  It is possible her prior surgeries will prevent a total lung collapse with pleural adhesions.    Recommendations:  Oxygen to maintain sats above 96%  Chest CT today per Dr. Arias, will follow this up too.    Do not suspect a chest tube will be needed, and if CXR tomorrow is normal, AND there is nothing more than a minimal pneumothorax on her CT, I would be comfortable sending her home with instructions on  coming back if her pain worsened.      TTS greater than 40 min, more than 50% on counseling and coordination of care    I appreciate the opportunity to participate in the care of Gabriela Eason.  Please feel free to contact me at any time.    Brock Rosado MD PhD  [facility] Pulmonary Critical Care        History reviewed. No pertinent family history.  No Known Allergies    Medications:     No current outpatient medications on file.

## 2022-03-05 NOTE — PLAN OF CARE
Problem: Plan of Care - These are the overarching goals to be used throughout the patient stay.    Goal: Optimal Comfort and Wellbeing  Outcome: Ongoing, Not Progressing  Patient experiencing nausea & vomiting overnight. House officer updated and gave new orders.     Independent in the room.

## 2022-03-05 NOTE — PROVIDER NOTIFICATION
Dr. Beard paged to notify of refusal of IV fluids with K+.  Pt states any IV fluids with potassium burn and she refused it.  Nausea has resolved at this time and patient was able to eat some food without further emesis.

## 2022-03-05 NOTE — SIGNIFICANT EVENT
"Significant Event Note    Time of event: 4:45 AM March 5, 2022    Description of event:  Notified patient has been experiencing worsening vomiting.    Briefly, Gabriela is a 26-year-old G1, P0 at 15 and 2 admitted with recurrent pneumothorax.    She began experiencing nausea and nonbloody nonbilious vomiting earlier this evening.  Has had symptoms like this in the past, usually resolves with famotidine.  Trial of famotidine without improvement.  Also with nonbloody loose stool.  Reports mild abdominal discomfort that began following episodes of vomiting.  No fever or chills.  No shortness of breath.  Tolerating some oral intake.      /54 (BP Location: Right arm)   Pulse 71   Temp 98.4  F (36.9  C) (Oral)   Resp 18   Ht 1.702 m (5' 7\")   Wt 54.4 kg (120 lb)   SpO2 99%   BMI 18.79 kg/m      Exam  General; appears comfortable.  No acute distress.  Heart -regular rate and rhythm  Abdomen -bowel sounds present, nondistended, no tenderness.  Negative for guarding.    Plan:  Nausea vomiting  Symptoms likely attributable to current pregnancy.  With loose stools, can consider viral gastroenteritis.  Abdominal exam is reassuring.  Supportive cares at this time.    -Normal saline 100 mL/hour      Discussed with: Bedside nurse    Salvador Hollis MD PGY-1  [facility]   House officer      "

## 2022-03-05 NOTE — PLAN OF CARE
"Pt is A/O x4. Reported nausea that resolved after eating. RUL and RLL diminished. Pt denies pain but reports some \"chest tightness\" when taking deep breaths. Pt is not in acute respiratory distress. O2 sats 99%-100% on 10L via nonrebreather mask. VSS. Will continue to monitor.   PRIMARY DIAGNOSIS: Right pneumothorax  OUTPATIENT/OBSERVATION GOALS TO BE MET BEFORE DISCHARGE:  1. Stable vital signs Yes  2. Tolerating diet:Yes  3. Pain controlled with oral pain medications:  Yes  4. Positive bowel sounds:  Yes  5. Voiding without difficulty:  Yes  6. Able to ambulate:  Yes  7. Provider specific discharge goals met:  No    Discharge Planner Nurse   Safe discharge environment identified: No  Barriers to discharge: Yes       Entered by: Dunia Carrasquillo 03/04/2022 10:50 PM     Please review provider order for any additional goals.   Nurse to notify provider when observation goals have been met and patient is ready for discharge.                      "

## 2022-03-06 ENCOUNTER — APPOINTMENT (OUTPATIENT)
Dept: RADIOLOGY | Facility: HOSPITAL | Age: 27
End: 2022-03-06
Attending: INTERNAL MEDICINE
Payer: COMMERCIAL

## 2022-03-06 VITALS
TEMPERATURE: 99.2 F | HEIGHT: 67 IN | BODY MASS INDEX: 18.83 KG/M2 | DIASTOLIC BLOOD PRESSURE: 53 MMHG | OXYGEN SATURATION: 97 % | WEIGHT: 120 LBS | RESPIRATION RATE: 17 BRPM | HEART RATE: 90 BPM | SYSTOLIC BLOOD PRESSURE: 91 MMHG

## 2022-03-06 LAB
ALBUMIN SERPL-MCNC: 3.1 G/DL (ref 3.5–5)
ALP SERPL-CCNC: 47 U/L (ref 45–120)
ALT SERPL W P-5'-P-CCNC: 13 U/L (ref 0–45)
AST SERPL W P-5'-P-CCNC: 14 U/L (ref 0–40)
BILIRUB DIRECT SERPL-MCNC: 0.3 MG/DL
BILIRUB SERPL-MCNC: 0.7 MG/DL (ref 0–1)
C COLI+JEJUNI+LARI FUSA STL QL NAA+PROBE: NOT DETECTED
C DIFF TOX B STL QL: NEGATIVE
CALCIUM, IONIZED MEASURED: 1.11 MMOL/L (ref 1.11–1.3)
EC STX1 GENE STL QL NAA+PROBE: NOT DETECTED
EC STX2 GENE STL QL NAA+PROBE: NOT DETECTED
ION CA PH 7.4: 1.11 MMOL/L (ref 1.11–1.3)
MAGNESIUM SERPL-MCNC: 1.8 MG/DL (ref 1.8–2.6)
NOROV GI+II ORF1-ORF2 JNC STL QL NAA+PR: DETECTED
PH: 7.4 (ref 7.35–7.45)
POTASSIUM BLD-SCNC: 3.6 MMOL/L (ref 3.5–5)
PROT SERPL-MCNC: 5.9 G/DL (ref 6–8)
RVA NSP5 STL QL NAA+PROBE: NOT DETECTED
SALMONELLA SP RPOD STL QL NAA+PROBE: NOT DETECTED
SHIGELLA SP+EIEC IPAH STL QL NAA+PROBE: NOT DETECTED
V CHOL+PARA RFBL+TRKH+TNAA STL QL NAA+PR: NOT DETECTED
Y ENTERO RECN STL QL NAA+PROBE: NOT DETECTED

## 2022-03-06 PROCEDURE — 250N000011 HC RX IP 250 OP 636: Performed by: FAMILY MEDICINE

## 2022-03-06 PROCEDURE — 87493 C DIFF AMPLIFIED PROBE: CPT | Performed by: FAMILY MEDICINE

## 2022-03-06 PROCEDURE — 71045 X-RAY EXAM CHEST 1 VIEW: CPT

## 2022-03-06 PROCEDURE — 258N000003 HC RX IP 258 OP 636: Performed by: FAMILY MEDICINE

## 2022-03-06 PROCEDURE — G0378 HOSPITAL OBSERVATION PER HR: HCPCS

## 2022-03-06 PROCEDURE — 99217 PR OBSERVATION CARE DISCHARGE: CPT | Performed by: FAMILY MEDICINE

## 2022-03-06 PROCEDURE — 84132 ASSAY OF SERUM POTASSIUM: CPT | Performed by: INTERNAL MEDICINE

## 2022-03-06 PROCEDURE — 36415 COLL VENOUS BLD VENIPUNCTURE: CPT | Performed by: FAMILY MEDICINE

## 2022-03-06 PROCEDURE — 83735 ASSAY OF MAGNESIUM: CPT | Performed by: FAMILY MEDICINE

## 2022-03-06 PROCEDURE — 96376 TX/PRO/DX INJ SAME DRUG ADON: CPT

## 2022-03-06 PROCEDURE — 82040 ASSAY OF SERUM ALBUMIN: CPT | Performed by: FAMILY MEDICINE

## 2022-03-06 PROCEDURE — 87506 IADNA-DNA/RNA PROBE TQ 6-11: CPT | Performed by: FAMILY MEDICINE

## 2022-03-06 PROCEDURE — 82330 ASSAY OF CALCIUM: CPT | Performed by: FAMILY MEDICINE

## 2022-03-06 RX ORDER — PRENATAL VIT/IRON FUM/FOLIC AC 27MG-0.8MG
1 TABLET ORAL DAILY
Qty: 90 TABLET | Refills: 3 | Status: SHIPPED | OUTPATIENT
Start: 2022-03-07 | End: 2023-12-03

## 2022-03-06 RX ORDER — ACETAMINOPHEN 325 MG/1
650 TABLET ORAL EVERY 4 HOURS PRN
Status: ON HOLD | COMMUNITY
Start: 2022-03-06 | End: 2022-06-14

## 2022-03-06 RX ORDER — ONDANSETRON 4 MG/1
4 TABLET, FILM COATED ORAL EVERY 12 HOURS PRN
Qty: 10 TABLET | Refills: 0 | Status: ON HOLD | OUTPATIENT
Start: 2022-03-06 | End: 2022-06-14

## 2022-03-06 RX ADMIN — SODIUM CHLORIDE, POTASSIUM CHLORIDE, SODIUM LACTATE AND CALCIUM CHLORIDE: 600; 310; 30; 20 INJECTION, SOLUTION INTRAVENOUS at 00:10

## 2022-03-06 RX ADMIN — ONDANSETRON 4 MG: 2 INJECTION INTRAMUSCULAR; INTRAVENOUS at 08:48

## 2022-03-06 RX ADMIN — ONDANSETRON 4 MG: 2 INJECTION INTRAMUSCULAR; INTRAVENOUS at 00:14

## 2022-03-06 NOTE — PLAN OF CARE
Goal Outcome Evaluation: Pt A&Ox4, VSS and pt denies pain. Pt declined taking the prenatal vitamin and pepcid. Pt was given IV Zofran at 0848 for intermittent nausea. Pt capable of making needs known. Pt did not have nasal canula on when writer went into the room and O2 stats have been in the 90's.    Problem: Plan of Care - These are the overarching goals to be used throughout the patient stay.    Goal: Plan of Care Review/Shift Note  Outcome: Ongoing, Progressing     Problem: Plan of Care - These are the overarching goals to be used throughout the patient stay.    Goal: Absence of Hospital-Acquired Illness or Injury  Outcome: Ongoing, Progressing  Intervention: Identify and Manage Fall Risk  Intervention: Prevent Skin Injury  Intervention: Prevent and Manage VTE (Venous Thromboembolism) Risk     Problem: Plan of Care - These are the overarching goals to be used throughout the patient stay.    Goal: Optimal Comfort and Wellbeing  Outcome: Ongoing, Progressing     Problem: Nausea and Vomiting  Goal: Fluid and Electrolyte Balance  Outcome: Ongoing, Progressing  Intervention: Prevent and Manage Nausea and Vomiting  Recent Flowsheet Documentation  Taken 3/6/2022 1105 by Crystal Mcmanus RN  Nausea/Vomiting Interventions: antiemetic

## 2022-03-06 NOTE — UTILIZATION REVIEW
Continued stay Observation     Concurrent stay review; Secondary Review Determination         Under the authority of the Utilization Management Committee, the utilization review process indicated a secondary review on the above patient.  The review outcome is based on review of the medical records, discussions with staff, and applying clinical experience noted on the date of the review.          (x) Observation Status Appropriate - Concurrent stay review    RATIONALE FOR DETERMINATION   26-year-old female pregnant at 12 weeks gestational age, history of recurrent bilateral pneumothoraces admitted for shortness of breath and right chest pain found to have a right pneumothorax.  Pt Has history of right VATS with mechanical pleurodesis and apical pleurectomy through the subxiphoid approach in 2017.  Also has a history of a left pneumothorax with fluoroscopy thoracoscopy, left wedge lung resection and pleurodesis in 2020.  Surgery and pulmonology follows.  CT chest shows small pneumothorax remains stable.  No other concerning findings.  At this time, conservative management and monitor.    Patient is clinically improving and there is no clear indication to change patient's status to inpatient. The severity of illness, intensity of service provided, expected LOS and risk for adverse outcome make the care appropriate for observation.    The information on this document is developed by the utilization review team in order for the business office to ensure compliance.  This only denotes the appropriateness of proper admission status and does not reflect the quality of care rendered.         The definitions of Inpatient Status and Observation Status used in making the determination above are those provided in the CMS Coverage Manual, Chapter 1 and Chapter 6, section 70.4.      Sincerely,   Arash Rubio MD    Utilization Review  Physician Advisor  [facility].

## 2022-03-06 NOTE — PLAN OF CARE
"PRIMARY DIAGNOSIS: \"pneumothorax  OUTPATIENT/OBSERVATION GOALS TO BE MET BEFORE DISCHARGE:  ADLs back to baseline: Yes    Activity and level of assistance: Ambulating independently.    Pain status: Pain free.    Return to near baseline physical activity: No     Discharge Planner Nurse   Safe discharge environment identified:  still on obs  Barriers to discharge: No       En[facility]: [facility] 03/05/2022 11:08 PM  Pt denied n/v, had smear of stool mixed with urine in hat, encourage to prevent urine from entering the hat. Pt verbalized understanding , decline Pepcid at bedtime stating she does not have gastric. IVF running at 125ml/hr.      Please review provider order for any additional goals.   Nurse to notify provider when observation goals have been met and patient is ready for discharge.Goal Outcome Evaluation:                      "

## 2022-03-06 NOTE — PROGRESS NOTES
Pulm update    Tiny apical PTX remains stable.  Chest CT does not show concerning findings in my read.  From a pulmonary standpoint she is OK for discharge barring any workup of her diarrhea.  Would defer to Dr. Hugo gonzalez of Chest CT before final disposition made.    Brock Rosado MD PhD  [facility] Pulmonary Critical Care

## 2022-03-06 NOTE — PROGRESS NOTES
Pt had another episode of loose stool, HO notified since there was no precise order of what to test for with the stool, sample collected pending provider decision at AM, per SKIP. c/o nausea  had prn Zofran x1. Resting at this time, chest x ray scheduled for 8am.

## 2022-03-06 NOTE — PROGRESS NOTES
Patient is pleasant yet flat.  C/o nausea- PRN zofran given.  Slept for 1 hour and then woke up vomiting.  Pagerobert SELLERS.  New order for compazine given with relief of nausea.  Continues on IV fluids per orders.  Will continue to monitor.  Will monitor stools and send sample if watery stool observed.  Pt denies abdominal pain.  No c/o SOB.

## 2022-03-06 NOTE — PROGRESS NOTES
Progress Note    Assessment/Plan  Patient CT scan reviewed with her.  Very minimal normal.  Lung is tethered and a small chance of this falling any further.  Other symptoms ongoing.  Repeat chest x-ray if patient stays in the hospital overnight.    Active Problems:    Pneumothorax on right      Subjective  Less discomfort in the right chest  Objective    Vital signs in last 24 hours  Temp:  [98.3  F (36.8  C)-99.5  F (37.5  C)] 98.3  F (36.8  C)  Pulse:  [] 90  Resp:  [16-18] 18  BP: ()/(51-66) 92/55  SpO2:  [95 %-100 %] 97 %  Weight:   [unfilled]    Intake/Output last 3 shifts  I/O last 3 completed shifts:  In: 1027 [P.O.:850; I.V.:177]  Out: -   Intake/Output this shift:  I/O this shift:  In: 1841 [I.V.:1841]  Out: -       Physical Exam  No change    Pertinent Labs   Lab Results   Component Value Date    WBC 7.6 03/04/2022    HGB 12.3 03/04/2022    HCT 37.1 03/04/2022    MCV 91 03/04/2022     03/04/2022             Pertinent Radiology     [unfilled]        Dax Arias MD

## 2022-03-07 NOTE — PLAN OF CARE
Pt is adequate for discharge to home. No nausea or pain. Stools are improving her pt. Went over discharge paperwork and she has her belongings with her.     Problem: Plan of Care - These are the overarching goals to be used throughout the patient stay.    Goal: Readiness for Transition of Care  Outcome: Adequate for Care Transition     Dunia Carrasquillo RN on 3/6/2022 at 6:33 PM

## 2022-03-09 NOTE — DISCHARGE SUMMARY
[facility]  Hospitalist Discharge Summary      Date of Admission:  3/4/2022  Date of Discharge:  3/6/2022  7:14 PM  Discharging Provider: PHU PHILLIPS MD      Discharge Diagnoses   Active Problems:    Pneumothorax on right       Discharge Procedure Orders   Reason for your hospital stay   Order Comments: Pneumothorax     Follow-up and recommended labs and tests   Order Comments: Follow up with primary care provider, [facility], within 3-5 days to evaluate medication change and for hospital follow- up.  The following labs/tests are recommended: BMP, Mag, CBC.     Activity   Order Comments: Your activity upon discharge: activity as tolerated     Order Specific Question Answer Comments   Is discharge order? Yes      When to contact your care team   Order Comments: Call your primary doctor if you have any of the following: temperature greater than 100.5,  increased shortness of breath or increased pain.     Discharge Instructions   Order Comments: Follow-up Dr. Arias in 1 to 2 weeks.  Follow-up with [facility] liver clinic in 3 to 4 weeks for liver lesion     Diet   Order Comments: Follow this diet upon discharge: Orders Placed This Encounter      Regular Diet Adult       Order Specific Question Answer Comments   Is discharge order? Yes           Hospital Course   26-year-old female pregnant at 12 weeks gestational age, history of recurrent bilateral pneumothoraces admitted for shortness of breath and right chest pain found to have a right pneumothorax.  Patient is being followed by pulmonology and surgery     Right pneumothorax  Has history of right VATS with mechanical pleurodesis and apical pleurectomy through the subxiphoid approach in 2017.  Also has a history of a left pneumothorax with fluoroscopy thoracoscopy, left wedge lung resection and pleurodesis in 2020.  Chest x-ray and CT shows a small right pneumothorax  having palpable pain on the right chest, no shortness of  breath, not hypoxic  Seen by pulmonology and general surgery, unable to tolerate nasal cannula or facemask.  Repeat CXR showed no size increase of tiny pTX  Unlikely to increase in size due to pevious surgery.  Follow up with surgery as OP     Nausea and vomiting with diarrhea  Unremarkable abdominal exam. No melena or hematochezia.  Normal WBC. \ tolerating regular diet now  Nausea and vomiting could be related to pregnancy,  Diarrhea improving at discharge. Painless  Neg Cdiff. Stool cx pending     Hypomagnesia-replaced with magnesium sulfate     Hypokalemia-replaced,      12 week pregnancy-  Follows with Dr. Betzy Adams.  Outpatient follow-up    Liver lesion.   Nl LFT. F/u with [facility] 2-4 weeks for further imaging. Discussed with [facility] and pt        Consultations This Hospital Stay   SOCIAL WORK IP CONSULT  SURGERY GENERAL IP CONSULT    Code Status   Prior         Greater than 35 minutes spent on coordinating discharge, evaluating labs, studies, evaluating patient, evaluating specialist notes    PHU PHILLIPS MD  [address] [address]  Phone: [phone]  Fax: [phone]  ______________________________________________________________________         Primary Care Physician   [facility]    Discharge Orders      Reason for your hospital stay    Pneumothorax     Follow-up and recommended labs and tests    Follow up with primary care provider, [facility], within 3-5 days to evaluate medication change and for hospital follow- up.  The following labs/tests are recommended: BMP, Mag, CBC.     Activity    Your activity upon discharge: activity as tolerated     When to contact your care team    Call your primary doctor if you have any of the following: temperature greater than 100.5,  increased shortness of breath or increased pain.     Discharge Instructions    Follow-up Dr. Arias in 1 to 2 weeks.  Follow-up with [facility]  liver clinic in 3 to 4 weeks for liver lesion     Diet    Follow this diet upon discharge: Orders Placed This Encounter      Regular Diet Adult         Significant Results and Procedures   Most Recent 3 CBC's:Recent Labs   Lab Test 03/04/22  1829 01/11/22  1531 03/13/20  0305   WBC 7.6 7.6 4.5   HGB 12.3 13.2 13.2   MCV 91 91 90    204 169     Most Recent 3 BMP's:Recent Labs   Lab Test 03/06/22  0621 03/05/22  1212 03/04/22  2241 03/04/22  1829 03/13/20  0305   NA  --  138  --  135* 139   POTASSIUM 3.6 3.7 3.6 3.3* 4.0   CHLORIDE  --  106  --  104 107   CO2  --  16*  --  21* 24   BUN  --  7*  --  7* 9   CR  --  0.50*  --  0.64 0.73   ANIONGAP  --  16  --  10 8   JENNY  --  7.7*  --  8.8 9.3   GLC  --  91  --  127* 91     Most Recent 2 LFT's:Recent Labs   Lab Test 03/06/22 0621   AST 14   ALT 13   ALKPHOS 47   BILITOTAL 0.7   ,   Results for orders placed or performed during the hospital encounter of 03/04/22   Chest XR,  PA & LAT    Addendum: 3/4/2022    Addendum: Prior measurement 0.6 cm and not 2.6 cm.      Narrative    EXAM: XR CHEST 2 VW  LOCATION: [facility]  DATE/TIME: 3/4/2022 12:41 PM    INDICATION: Shortness of breath, history of spontaneous pneumothorax.  COMPARISON: 01/27/2022      Impression    IMPRESSION: Postsurgical changes in the right apex. There is a small right pneumothorax present with the air-filled right apical pleural space measuring 1.2 cm in comparison to 2.6 cm on the prior exam. Pneumothorax has increased in size.   XR Chest 1 View    Narrative    EXAM: XR CHEST 1 VIEW  LOCATION: [facility]  DATE/TIME: 3/4/2022 5:59 PM    INDICATION: Right pneumothorax. Follow-up.  COMPARISON: 03/04/2022 12:41 PM      Impression    IMPRESSION: Trace right apical pneumothorax is decreased in volume with 7 mm craniocaudal separation between the visceral and parietal pleura previously 12 mm. Biapical wedge resection suture lines.   XR Chest 2 Views     Narrative    EXAM: XR CHEST 2 VIEW  LOCATION: [facility]  DATE/TIME: 03/05/2022, 7:49 AM    INDICATION: History of pneumothorax.  COMPARISON: 03/04/2022.      Impression    IMPRESSION: There is a tiny right apical pneumothorax, minimally decreased from comparison. Lungs and pleural spaces are clear. Heart and mediastinal size are normal.     CT Chest w Contrast    Narrative    EXAM: CT CHEST W CONTRAST  LOCATION: [facility]  DATE/TIME: 3/5/2022 2:47 PM    INDICATION: Right pneumothorax.  COMPARISON: CXRs 03/05/2022, 03/04/2022 6:00 PM and 03/04/2022 1:00 PM. CTs chest 3/15/2020 and 3/22/2017.  TECHNIQUE: CT chest with IV contrast. Multiplanar reformats were obtained. Dose reduction techniques were used.    CONTRAST: Isovue 370 90 ml.    FINDINGS:   LUNGS AND PLEURA: Trace pneumothorax distributed fairly equally throughout the right hemithorax allowing for difference in technique unchanged since this morning's earlier radiograph. The right upper lobe apical visceral pleura appears tethered to the   anterior apical parietal pleura at the wedge resection staple line. Wedge resection staple line apex left upper lobe medially. Lungs otherwise clear. No pleural effusion.    MEDIASTINUM/AXILLAE: No lymphadenopathy. Mild residual thymic tissue. Heart size normal with no pericardial effusion.    CORONARY ARTERY CALCIFICATION: None.    UPPER ABDOMEN: Solid 2.5 x 2.5 x 2.0 cm mass hepatic segment 7 right hepatic lobe with diffuse arterial phase enhancement is not visible on prior CTs chest which could relate to lack of IV contrast material or timing of contrast bolus.     MUSCULOSKELETAL: Unremarkable.      Impression    IMPRESSION:   1.  Trace pneumothorax distributed fairly equally throughout the right hemithorax allowing for difference in technique unchanged since this morning's earlier radiograph.   2.  The right upper lobe apical visceral pleura appears tethered to the  "anterior apical parietal pleura at the wedge resection staple line.   3.  A 2.5 cm right hepatic lobe lesion that could represent focal nodular hyperplasia, flash fill hemangioma or less likely hepatic adenoma. Recommend nonurgent MRI liver using Eovist.   XR Chest Port 1 View    Narrative    EXAM: XR CHEST PORTABLE 1 VIEW  LOCATION: [facility]  DATE/TIME: 03/06/2022, 8:05 AM    INDICATION: Follow-up pneumothorax before discharge.  COMPARISON: 03/05/2022, 3:00 PM CT chest and 03/05/2022, 7:49 AM chest x-ray.      Impression    IMPRESSION: Tiny right apical pneumothorax, stable to minimally decreased with 9 mm craniocaudal separation between the visceral and parietal pleura at the apex and 2 mm transverse separation of visceral and parietal pleura laterally, previously 4 mm.   Wedge resection suture lines in the upper lobe apices again seen. Chest otherwise negative.           Discharge Medications   Discharge Medication List as of 3/6/2022  6:09 PM      START taking these medications    Details   acetaminophen (TYLENOL) 325 MG tablet Take 2 tablets (650 mg) by mouth every 4 hours as needed for mild pain or fever, OTC      ondansetron (ZOFRAN) 4 MG tablet Take 1 tablet (4 mg) by mouth every 12 hours as needed for nausea or vomiting, Disp-10 tablet, R-0, E-Prescribe      Prenatal Vit-Fe Fumarate-FA (PRENATAL MULTIVITAMIN W/IRON) 27-0.8 MG tablet Take 1 tablet by mouth daily, Disp-90 tablet, R-3, E-Prescribe         CONTINUE these medications which have NOT CHANGED    Details   famotidine (PEPCID) 40 MG tablet Take 40 mg by mouth At Bedtime, Historical           Allergies   No Known Allergies    Physical Exam:       BP 91/53 (BP Location: Left arm)   Pulse 90   Temp 99.2  F (37.3  C) (Oral)   Resp 17   Ht 1.702 m (5' 7\")   Wt 54.4 kg (120 lb)   SpO2 97%   BMI 18.79 kg/m    General appearance: alert, appears stated age and cooperative  Head: Normocephalic, without obvious abnormality, " atraumatic  Eyes: Clear conjuctiva  Neck: no JVD and supple, symmetrical, trachea midline  Lungs: clear to auscultation bilaterally  Heart: regular rate and rhythm, S1, S2 normal, no murmur, click, rub or gallop  Abdomen: soft, non-tender; bowel sounds normal; no masses,  no organomegaly  Extremities: Kojo's sign is negative, no sign of DVT  Skin: Skin color, texture, turgor normal. No rashes or lesions  Neurologic: Grossly normal

## 2022-03-11 ENCOUNTER — LAB REQUISITION (OUTPATIENT)
Dept: LAB | Facility: CLINIC | Age: 27
End: 2022-03-11
Payer: COMMERCIAL

## 2022-03-11 DIAGNOSIS — J93.9 PNEUMOTHORAX, UNSPECIFIED: ICD-10-CM

## 2022-03-11 LAB
ANION GAP SERPL CALCULATED.3IONS-SCNC: 9 MMOL/L (ref 5–18)
BUN SERPL-MCNC: 7 MG/DL (ref 8–22)
CALCIUM SERPL-MCNC: 9.1 MG/DL (ref 8.5–10.5)
CHLORIDE BLD-SCNC: 105 MMOL/L (ref 98–107)
CO2 SERPL-SCNC: 24 MMOL/L (ref 22–31)
CREAT SERPL-MCNC: 0.64 MG/DL (ref 0.6–1.1)
ERYTHROCYTE [DISTWIDTH] IN BLOOD BY AUTOMATED COUNT: 11.9 % (ref 10–15)
GFR SERPL CREATININE-BSD FRML MDRD: >90 ML/MIN/1.73M2
GLUCOSE BLD-MCNC: 106 MG/DL (ref 70–125)
HCT VFR BLD AUTO: 38 % (ref 35–47)
HGB BLD-MCNC: 12.5 G/DL (ref 11.7–15.7)
MAGNESIUM SERPL-MCNC: 1.7 MG/DL (ref 1.8–2.6)
MCH RBC QN AUTO: 29.8 PG (ref 26.5–33)
MCHC RBC AUTO-ENTMCNC: 32.9 G/DL (ref 31.5–36.5)
MCV RBC AUTO: 91 FL (ref 78–100)
PLATELET # BLD AUTO: 173 10E3/UL (ref 150–450)
POTASSIUM BLD-SCNC: 4 MMOL/L (ref 3.5–5)
RBC # BLD AUTO: 4.2 10E6/UL (ref 3.8–5.2)
SODIUM SERPL-SCNC: 138 MMOL/L (ref 136–145)
WBC # BLD AUTO: 5.4 10E3/UL (ref 4–11)

## 2022-03-11 PROCEDURE — 85027 COMPLETE CBC AUTOMATED: CPT | Mod: ORL | Performed by: FAMILY MEDICINE

## 2022-03-11 PROCEDURE — 83735 ASSAY OF MAGNESIUM: CPT | Mod: ORL | Performed by: FAMILY MEDICINE

## 2022-03-11 PROCEDURE — 80048 BASIC METABOLIC PNL TOTAL CA: CPT | Mod: ORL | Performed by: FAMILY MEDICINE

## 2022-05-23 ENCOUNTER — HOSPITAL ENCOUNTER (OUTPATIENT)
Dept: ULTRASOUND IMAGING | Facility: HOSPITAL | Age: 27
Discharge: HOME | End: 2022-05-23
Attending: FAMILY MEDICINE | Admitting: FAMILY MEDICINE
Payer: COMMERCIAL

## 2022-05-23 DIAGNOSIS — Z34.82 ENCOUNTER FOR SUPERVISION OF OTHER NORMAL PREGNANCY IN SECOND TRIMESTER: ICD-10-CM

## 2022-05-23 PROCEDURE — 76815 OB US LIMITED FETUS(S): CPT

## 2022-05-28 ENCOUNTER — HOSPITAL ENCOUNTER (OUTPATIENT)
Facility: HOSPITAL | Age: 27
Discharge: HOME | End: 2022-05-28
Attending: FAMILY MEDICINE | Admitting: FAMILY MEDICINE
Payer: COMMERCIAL

## 2022-05-28 ENCOUNTER — HOSPITAL ENCOUNTER (OUTPATIENT)
Facility: HOSPITAL | Age: 27
End: 2022-05-28
Admitting: FAMILY MEDICINE
Payer: COMMERCIAL

## 2022-05-28 ENCOUNTER — HOSPITAL ENCOUNTER (EMERGENCY)
Facility: HOSPITAL | Age: 27
Discharge: HOME | End: 2022-05-28
Attending: EMERGENCY MEDICINE | Admitting: EMERGENCY MEDICINE
Payer: COMMERCIAL

## 2022-05-28 ENCOUNTER — APPOINTMENT (OUTPATIENT)
Dept: ULTRASOUND IMAGING | Facility: HOSPITAL | Age: 27
End: 2022-05-28
Attending: EMERGENCY MEDICINE
Payer: COMMERCIAL

## 2022-05-28 ENCOUNTER — HEALTH MAINTENANCE LETTER (OUTPATIENT)
Age: 27
End: 2022-05-28

## 2022-05-28 ENCOUNTER — APPOINTMENT (OUTPATIENT)
Dept: MRI IMAGING | Facility: HOSPITAL | Age: 27
End: 2022-05-28
Attending: EMERGENCY MEDICINE
Payer: COMMERCIAL

## 2022-05-28 VITALS
HEART RATE: 75 BPM | TEMPERATURE: 98.1 F | DIASTOLIC BLOOD PRESSURE: 62 MMHG | RESPIRATION RATE: 16 BRPM | SYSTOLIC BLOOD PRESSURE: 110 MMHG

## 2022-05-28 VITALS
WEIGHT: 125 LBS | RESPIRATION RATE: 12 BRPM | DIASTOLIC BLOOD PRESSURE: 54 MMHG | OXYGEN SATURATION: 100 % | TEMPERATURE: 98.5 F | BODY MASS INDEX: 19.62 KG/M2 | SYSTOLIC BLOOD PRESSURE: 95 MMHG | HEART RATE: 72 BPM | HEIGHT: 67 IN

## 2022-05-28 DIAGNOSIS — O26.892 RIGHT LOWER QUADRANT ABDOMINAL PAIN AFFECTING PREGNANCY IN SECOND TRIMESTER: ICD-10-CM

## 2022-05-28 DIAGNOSIS — R10.31 RIGHT LOWER QUADRANT ABDOMINAL PAIN AFFECTING PREGNANCY IN SECOND TRIMESTER: ICD-10-CM

## 2022-05-28 DIAGNOSIS — N13.39 OTHER HYDRONEPHROSIS: ICD-10-CM

## 2022-05-28 PROBLEM — Z36.89 ENCOUNTER FOR TRIAGE IN PREGNANT PATIENT: Status: ACTIVE | Noted: 2022-05-28

## 2022-05-28 LAB
ALBUMIN SERPL-MCNC: 3.3 G/DL (ref 3.5–5)
ALBUMIN UR-MCNC: 10 MG/DL
ALP SERPL-CCNC: 72 U/L (ref 45–120)
ALT SERPL W P-5'-P-CCNC: <9 U/L (ref 0–45)
ANION GAP SERPL CALCULATED.3IONS-SCNC: 12 MMOL/L (ref 5–18)
APPEARANCE UR: CLEAR
AST SERPL W P-5'-P-CCNC: 15 U/L (ref 0–40)
BILIRUB DIRECT SERPL-MCNC: 0.1 MG/DL
BILIRUB SERPL-MCNC: 0.3 MG/DL (ref 0–1)
BILIRUB UR QL STRIP: NEGATIVE
BUN SERPL-MCNC: 6 MG/DL (ref 8–22)
CALCIUM SERPL-MCNC: 8.9 MG/DL (ref 8.5–10.5)
CHLORIDE BLD-SCNC: 103 MMOL/L (ref 98–107)
CO2 SERPL-SCNC: 19 MMOL/L (ref 22–31)
COLOR UR AUTO: YELLOW
CREAT SERPL-MCNC: 0.56 MG/DL (ref 0.6–1.1)
ERYTHROCYTE [DISTWIDTH] IN BLOOD BY AUTOMATED COUNT: 12.4 % (ref 10–15)
GFR SERPL CREATININE-BSD FRML MDRD: >90 ML/MIN/1.73M2
GLUCOSE BLD-MCNC: 89 MG/DL (ref 70–125)
GLUCOSE UR STRIP-MCNC: NEGATIVE MG/DL
HCT VFR BLD AUTO: 38.3 % (ref 35–47)
HGB BLD-MCNC: 12.8 G/DL (ref 11.7–15.7)
HGB UR QL STRIP: NEGATIVE
KETONES UR STRIP-MCNC: NEGATIVE MG/DL
LEUKOCYTE ESTERASE UR QL STRIP: NEGATIVE
LIPASE SERPL-CCNC: 27 U/L (ref 0–52)
MCH RBC QN AUTO: 30.5 PG (ref 26.5–33)
MCHC RBC AUTO-ENTMCNC: 33.4 G/DL (ref 31.5–36.5)
MCV RBC AUTO: 91 FL (ref 78–100)
NITRATE UR QL: NEGATIVE
PH UR STRIP: 8 [PH] (ref 5–7)
PLATELET # BLD AUTO: 208 10E3/UL (ref 150–450)
POTASSIUM BLD-SCNC: 3.8 MMOL/L (ref 3.5–5)
PROT SERPL-MCNC: 7.1 G/DL (ref 6–8)
RBC # BLD AUTO: 4.19 10E6/UL (ref 3.8–5.2)
SODIUM SERPL-SCNC: 134 MMOL/L (ref 136–145)
SP GR UR STRIP: 1.02 (ref 1–1.03)
UROBILINOGEN UR STRIP-MCNC: <2 MG/DL
WBC # BLD AUTO: 9.7 10E3/UL (ref 4–11)

## 2022-05-28 PROCEDURE — 74181 MRI ABDOMEN W/O CONTRAST: CPT

## 2022-05-28 PROCEDURE — 85027 COMPLETE CBC AUTOMATED: CPT | Performed by: EMERGENCY MEDICINE

## 2022-05-28 PROCEDURE — 76815 OB US LIMITED FETUS(S): CPT

## 2022-05-28 PROCEDURE — 36415 COLL VENOUS BLD VENIPUNCTURE: CPT | Performed by: EMERGENCY MEDICINE

## 2022-05-28 PROCEDURE — 82248 BILIRUBIN DIRECT: CPT | Performed by: EMERGENCY MEDICINE

## 2022-05-28 PROCEDURE — 81003 URINALYSIS AUTO W/O SCOPE: CPT | Performed by: FAMILY MEDICINE

## 2022-05-28 PROCEDURE — 82310 ASSAY OF CALCIUM: CPT | Performed by: EMERGENCY MEDICINE

## 2022-05-28 PROCEDURE — 99285 EMERGENCY DEPT VISIT HI MDM: CPT | Mod: 25

## 2022-05-28 PROCEDURE — G0463 HOSPITAL OUTPT CLINIC VISIT: HCPCS

## 2022-05-28 PROCEDURE — 83690 ASSAY OF LIPASE: CPT | Performed by: EMERGENCY MEDICINE

## 2022-05-28 RX ORDER — LIDOCAINE 40 MG/G
CREAM TOPICAL
Status: DISCONTINUED | OUTPATIENT
Start: 2022-05-28 | End: 2022-05-28 | Stop reason: HOSPADM

## 2022-05-28 NOTE — PROGRESS NOTES
Data: [facility] presented to [facility]: 5/28/2022  1:20 PM.  Reason for maternal/fetal assessment is right lower quadrant pain since Thursday. With three occurrences this shift. Monitors placed upon arrival, FHTs WDL. Gestational Age 25w0d. VSS. Fetal movement present. Patient denies leaking of fluid, discharge, or any UTI symptoms.Support person, Vu, is present.   Action: Verbal consent for EFM. Triage assessment completed. Bill of rights reviewed. Urine Collected  Response: Dr. Candelario updated to above, orders to send urine and eval in ED.

## 2022-05-28 NOTE — DISCHARGE INSTRUCTIONS
Please follow-up with your OB this upcoming week; call to arrange appointment.    Return to the ER for worsening symptoms, worsening abdominal pain, persistent vomiting, vaginal bleeding, loss of vaginal fluid, fever or other concerns.    I recommend pelvic rest (nothing in the vagina - no sex, fingers, etc) until you get clearance from your OB.

## 2022-05-28 NOTE — ED TRIAGE NOTES
Pt is 24 weeks pregnant with an edc of 9/10/22. She went up to [facility] initially as she has had intermitent cramping since Thursday when she wakes up from sleeping. Pt also felt less movement of the baby so she came in today. [facility] said everything lokeed good but they sent pt here for evaluation.  ua sent from [facility].

## 2022-05-28 NOTE — ED PROVIDER NOTES
Emergency Department Encounter     Evaluation Date & Time:   No admission date for patient encounter.    CHIEF COMPLAINT:  Abdominal Cramping      Triage Note:       Impression and Plan       FINAL IMPRESSION:    ICD-10-CM    1. Right lower quadrant abdominal pain affecting pregnancy in second trimester  O26.892     R10.31    2. Other hydronephrosis  N13.39          ED COURSE & MEDICAL DECISION MAKIN:07PM: I met with the patient to gather history and to perform my initial exam. We discussed plans for the ED course, including diagnostic testing and treatment.   4:33 PM I rechecked and updated the patient with results. We discussed plan of discharge.     26 year old female,  at 25 weeks gestation with history of recurrent right-sided pneumothorax, who presents for evaluation of RLQ abdominal cramping pain x 2 days, worse since this morning. Pain is worse with walking. She reports some nausea with an episode of vomiting last night, however has had nausea and vomiting during pregnancy. No diarrhea, constipation or urinary symptoms. No vaginal bleeding or loss of vaginal fluid. Reports some decreased fetal movement; she was seen by L&D upstairs and baby looked good by fetal monitoring.     On exam, lower abdomen is gravid; remainder of abdomen is soft and non-tender to palpation.    Blood sent for labs.    Labs remarkable for no leukocytosis, anemia, significant electrolyte derangements or renal impairment.  She does have mild acidosis with bicarb of 19.  No laboratory evidence of hepatitis, biliary obstruction or pancreatitis.    UA had been sent from upstairs and was negative for suggestion of infection with no bacteria. Urine culture ordered.    OB ultrasound performed and demonstrated single intrauterine gestation, currently in a breech presentation; no abnormality seen.    MRI abdomen performed to evaluate for possible appendicitis and demonstrated:  1.  Normal appendix.  2.  Gravid uterus.  3.  Trace  "bilateral hydronephrosis.    Patient discharged to home with follow-up OB next week. Pelvic rest and return precautions provided. Patient stable throughout ED course.      At the conclusion of the encounter I discussed the results of all the tests and the disposition. The questions were answered. The patient and family acknowledged understanding and were agreeable with the care plan.      MEDICATIONS GIVEN IN THE EMERGENCY DEPARTMENT:  Medications - No data to display    NEW PRESCRIPTIONS STARTED AT TODAY'S ED VISIT:  New Prescriptions    No medications on file       HPI     HPI     Gabriela Eason is a 26 year old female,  at 25 weeks gestation with history of recurrent right-sided pneumothorax, who presents to this ED via private vehicle for evaluation of abdominal cramping. She reports right lower quadrant abdominal cramping pain since Thursday (x 2 days). This morning the cramping was worse and felt as though someone was \"squishing me with cement blocks\" to her right abdomen. The pain is worse with walking and nearly resolves with rest. She reports nausea with an episode of vomiting last night, however has had nausea with occasional vomiting during her pregnancy. No diarrhea or constipation. She denies dysuria and hematuria. No vaginal bleeding or loss of vaginal fluid. No fevers.  Reports some decreased fetal movement; patient was seen by L&D upstairs and baby looked good by fetal monitoring.     She otherwise chronic right-sided chest pain secondary to previous pneumothorax.  She denies shortness of breath, cough or other concerns.    REVIEW OF SYSTEMS:  All other systems reviewed and are negative.      Medical History     Past Medical History:   Diagnosis Date     Acute respiratory failure (H) 3/24/2017     PONV (postoperative nausea and vomiting)      Status post thoracostomy tube placement (H) 3/24/2017       Past Surgical History:   Procedure Laterality Date     THORACOSCOPY Right 3/23/2017    " "Procedure: RIGHT VIDEO ASSISTED THORACOSCOPY WITH MECHANICAL PLEURODESIS AND APICAL PLEURECTOMY THROUGH A SUBXYPHIOD APPROACH;  Surgeon: Ricky Hernandez MD;  Location: [facility];  Service:      [facility] THORACOSCOPY,DX NO BX Left 3/16/2020    Procedure: THORACOSCOPY, LEFT LUNG WEDGE RESECTION, PLEURODESIS, CHEST TUBE PLACEMENT;  Surgeon: Dax Arias MD;  Location: [facility];  Service: General       No family history on file.    Social History     Tobacco Use     Smoking status: Former Smoker     Smokeless tobacco: Never Used   Substance Use Topics     Alcohol use: No     Drug use: No       acetaminophen (TYLENOL) 325 MG tablet  famotidine (PEPCID) 40 MG tablet  ondansetron (ZOFRAN) 4 MG tablet  Prenatal Vit-Fe Fumarate-FA (PRENATAL MULTIVITAMIN W/IRON) 27-0.8 MG tablet        Physical Exam     First Vitals:  Patient Vitals for the past 24 hrs:   BP Temp Pulse Resp SpO2 Height Weight   05/28/22 1406 105/69 98.5  F (36.9  C) 95 12 96 % 1.702 m (5' 7\") 56.7 kg (125 lb)       PHYSICAL EXAM:   Physical Exam    GENERAL: Awake, alert.  In no acute distress.   HEENT: Normocephalic, atraumatic. Pupils equal, round and reactive. Conjunctiva normal.  NECK: No stridor.  PULMONARY: Symmetrical breath sounds without distress.  Lungs clear to auscultation bilaterally without wheezes, rhonchi or rales.  CARDIO: Regular rate and rhythm.  No significant murmur, rub or gallop.  Radial pulses strong and symmetrical.  ABDOMINAL: Lower abdomen gravid; remainder of abdomen is soft. No tenderness to palpation.  EXTREMITIES: No lower extremity swelling or edema.      NEURO: Alert and oriented to person, place and time.  Cranial nerves grossly intact.  No focal motor deficit.  PSYCH: Normal mood and affect.  SKIN: No rashes.     Results     LAB:  All pertinent labs reviewed and interpreted  Labs Ordered and Resulted from Time of ED Arrival to Time of ED Departure   BASIC METABOLIC PANEL - Abnormal       Result Value "    Sodium 134 (*)     Potassium 3.8      Chloride 103      Carbon Dioxide (CO2) 19 (*)     Anion Gap 12      Urea Nitrogen 6 (*)     Creatinine 0.56 (*)     Calcium 8.9      Glucose 89      GFR Estimate >90     HEPATIC FUNCTION PANEL - Abnormal    Bilirubin Total 0.3      Bilirubin Direct 0.1      Protein Total 7.1      Albumin 3.3 (*)     Alkaline Phosphatase 72      AST 15      ALT <9     CBC WITH PLATELETS - Normal    WBC Count 9.7      RBC Count 4.19      Hemoglobin 12.8      Hematocrit 38.3      MCV 91      MCH 30.5      MCHC 33.4      RDW 12.4      Platelet Count 208     LIPASE - Normal    Lipase 27     URINE CULTURE   URINE CULTURE       RADIOLOGY:  MR Abdomen w/o Contrast   Final Result   IMPRESSION:   1.  Normal appendix.   2.  Gravid uterus.   3.  Trace bilateral hydronephrosis.      US OB >14 Weeks Limited wo Fetal Measurement   Final Result   IMPRESSION:   1.  Single intrauterine gestation, currently in a breech presentation. No abnormality seen.             I, Erika Steward, am serving as a scribe to document services personally performed by Gogo Beauchamp MD based on my observation and the provider's statements to me. I, Gogo Beauchamp MD attest that Erika Steward is acting in a scribe capacity, has observed my performance of the services and has documented them in accordance with my direction.    Gogo Beauchamp MD  Emergency Medicine  [facility] EMERGENCY DEPARTMENT           Gogo Beauchamp MD  05/28/22 [id number]

## 2022-06-14 ENCOUNTER — HOSPITAL ENCOUNTER (EMERGENCY)
Facility: HOSPITAL | Age: 27
Discharge: OTHER INSTITUTION | End: 2022-06-14
Payer: COMMERCIAL

## 2022-06-14 ENCOUNTER — HOSPITAL ENCOUNTER (OUTPATIENT)
Facility: HOSPITAL | Age: 27
Discharge: HOME | End: 2022-06-14
Attending: FAMILY MEDICINE | Admitting: FAMILY MEDICINE
Payer: COMMERCIAL

## 2022-06-14 VITALS
RESPIRATION RATE: 16 BRPM | DIASTOLIC BLOOD PRESSURE: 67 MMHG | SYSTOLIC BLOOD PRESSURE: 130 MMHG | WEIGHT: 127 LBS | HEIGHT: 67 IN | BODY MASS INDEX: 19.93 KG/M2 | HEART RATE: 82 BPM | TEMPERATURE: 98.6 F | OXYGEN SATURATION: 96 %

## 2022-06-14 PROCEDURE — G0463 HOSPITAL OUTPT CLINIC VISIT: HCPCS

## 2022-06-14 PROCEDURE — 96372 THER/PROPH/DIAG INJ SC/IM: CPT | Performed by: FAMILY MEDICINE

## 2022-06-14 PROCEDURE — 250N000011 HC RX IP 250 OP 636: Performed by: FAMILY MEDICINE

## 2022-06-14 RX ORDER — TERBUTALINE SULFATE 1 MG/ML
0.25 INJECTION, SOLUTION SUBCUTANEOUS ONCE
Status: COMPLETED | OUTPATIENT
Start: 2022-06-14 | End: 2022-06-14

## 2022-06-14 RX ADMIN — TERBUTALINE SULFATE 0.25 MG: 1 INJECTION SUBCUTANEOUS at 22:39

## 2022-06-15 NOTE — PROGRESS NOTES
Dr. Donahue updated on pt's admission, report of decel, FHTs and uterine activity. Order to monitor for 2 hours, check cervix and call back.

## 2022-06-15 NOTE — PROGRESS NOTES
Dr. Donahue updated on reassuring FHTs and contraction pattern. Patient reports feeling tightening across fundus. Denies pain. SVE at 2030= FT/30%/high. Order to give dose of Terbutaline now.

## 2022-06-15 NOTE — PROGRESS NOTES
Pt to half-way from ED at 1950.  Reported noticing a change in fetal activity today, an sudden burst of kicks in her left rib change that had never happened before. Also reported listening to FHTs at home with a doppler and hearing a drop from 130 bpm to 70 bpm. Denies LOF or bleeding. Stated she has felt tightening today.  Monitors applied. VSS.

## 2022-06-15 NOTE — PROGRESS NOTES
Contractions have lessened significantly since Terb. Call to Dr. Donahue. OK to discharge home. Instructed to continue to hydrate well orally and keep scheduled OB appt next week. Wheelchair discharge.

## 2022-06-16 ENCOUNTER — HOSPITAL ENCOUNTER (OUTPATIENT)
Facility: HOSPITAL | Age: 27
End: 2022-06-16
Admitting: FAMILY MEDICINE
Payer: COMMERCIAL

## 2022-06-16 ENCOUNTER — HOSPITAL ENCOUNTER (OUTPATIENT)
Facility: HOSPITAL | Age: 27
Discharge: HOME | End: 2022-06-16
Attending: FAMILY MEDICINE | Admitting: FAMILY MEDICINE
Payer: COMMERCIAL

## 2022-06-16 VITALS
TEMPERATURE: 97.9 F | DIASTOLIC BLOOD PRESSURE: 61 MMHG | BODY MASS INDEX: 19.93 KG/M2 | OXYGEN SATURATION: 99 % | SYSTOLIC BLOOD PRESSURE: 110 MMHG | HEIGHT: 67 IN | RESPIRATION RATE: 16 BRPM | HEART RATE: 81 BPM | WEIGHT: 127 LBS

## 2022-06-16 DIAGNOSIS — B96.89 BACTERIAL VAGINOSIS IN PREGNANCY: Primary | ICD-10-CM

## 2022-06-16 DIAGNOSIS — O23.599 BACTERIAL VAGINOSIS IN PREGNANCY: Primary | ICD-10-CM

## 2022-06-16 LAB
ALBUMIN UR-MCNC: NEGATIVE MG/DL
APPEARANCE UR: CLEAR
BILIRUB UR QL STRIP: NEGATIVE
CLUE CELLS: PRESENT
COLOR UR AUTO: COLORLESS
GLUCOSE UR STRIP-MCNC: NEGATIVE MG/DL
HGB UR QL STRIP: NEGATIVE
KETONES UR STRIP-MCNC: NEGATIVE MG/DL
LEUKOCYTE ESTERASE UR QL STRIP: NEGATIVE
NITRATE UR QL: NEGATIVE
PH UR STRIP: 6.5 [PH] (ref 5–7)
SP GR UR STRIP: 1 (ref 1–1.03)
TRICHOMONAS, WET PREP: ABNORMAL
UROBILINOGEN UR STRIP-MCNC: <2 MG/DL
WBC'S/HIGH POWER FIELD, WET PREP: ABNORMAL
YEAST, WET PREP: ABNORMAL

## 2022-06-16 PROCEDURE — 250N000011 HC RX IP 250 OP 636: Performed by: FAMILY MEDICINE

## 2022-06-16 PROCEDURE — 87210 SMEAR WET MOUNT SALINE/INK: CPT | Performed by: FAMILY MEDICINE

## 2022-06-16 PROCEDURE — 96372 THER/PROPH/DIAG INJ SC/IM: CPT | Performed by: FAMILY MEDICINE

## 2022-06-16 PROCEDURE — 258N000003 HC RX IP 258 OP 636: Performed by: FAMILY MEDICINE

## 2022-06-16 PROCEDURE — 81003 URINALYSIS AUTO W/O SCOPE: CPT | Performed by: FAMILY MEDICINE

## 2022-06-16 PROCEDURE — G0463 HOSPITAL OUTPT CLINIC VISIT: HCPCS

## 2022-06-16 RX ORDER — TERBUTALINE SULFATE 1 MG/ML
0.25 INJECTION, SOLUTION SUBCUTANEOUS ONCE
Status: COMPLETED | OUTPATIENT
Start: 2022-06-16 | End: 2022-06-16

## 2022-06-16 RX ORDER — LIDOCAINE 40 MG/G
CREAM TOPICAL
Status: DISCONTINUED | OUTPATIENT
Start: 2022-06-16 | End: 2022-06-16 | Stop reason: HOSPADM

## 2022-06-16 RX ORDER — METRONIDAZOLE 500 MG/1
500 TABLET ORAL 2 TIMES DAILY
Qty: 14 TABLET | Refills: 0 | Status: SHIPPED | OUTPATIENT
Start: 2022-06-16 | End: 2022-06-23

## 2022-06-16 RX ORDER — BETAMETHASONE SODIUM PHOSPHATE AND BETAMETHASONE ACETATE 3; 3 MG/ML; MG/ML
12 INJECTION, SUSPENSION INTRA-ARTICULAR; INTRALESIONAL; INTRAMUSCULAR; SOFT TISSUE ONCE
Status: COMPLETED | OUTPATIENT
Start: 2022-06-16 | End: 2022-06-16

## 2022-06-16 RX ADMIN — BETAMETHASONE ACETATE AND BETAMETHASONE SODIUM PHOSPHATE 12 MG: 3; 3 INJECTION, SUSPENSION INTRA-ARTICULAR; INTRALESIONAL; INTRAMUSCULAR; SOFT TISSUE at 10:49

## 2022-06-16 RX ADMIN — TERBUTALINE SULFATE 0.25 MG: 1 INJECTION SUBCUTANEOUS at 10:48

## 2022-06-16 RX ADMIN — SODIUM CHLORIDE, POTASSIUM CHLORIDE, SODIUM LACTATE AND CALCIUM CHLORIDE 1000 ML: 600; 310; 30; 20 INJECTION, SOLUTION INTRAVENOUS at 10:39

## 2022-06-16 NOTE — PROGRESS NOTES
Data: [facility].   Reason for maternal/fetal assessment per patient is Abdominal Cramping  .  Patient is a . Prenatal record reviewed.      OB History    Para Term  AB Living   1 0 0 0 0 0   SAB IAB Ectopic Multiple Live Births   0 0 0 0 0      # Outcome Date GA Lbr Riaz/2nd Weight Sex Delivery Anes PTL Lv   1 Current            . Medical history:   Past Medical History:   Diagnosis Date     Acute respiratory failure (H) 3/24/2017     PONV (postoperative nausea and vomiting)      Status post thoracostomy tube placement (H) 3/24/2017   . Gestational Age 27w5d. Patient was here on  for  contractions, she was given terb and then discharge to home after contractions stopped.  VSS. Fetal movement present. Patient denies backache, vaginal discharge, pelvic pressure, UTI symptoms, GI problems, bloody show, vaginal bleeding, edema, headache, visual disturbances, epigastric or URQ pain, abdominal pain, rupture of membranes. Support persons significant other, Vu present.  Action: Verbal consent for EFM. Triage assessment completed.  Fetal assessment: Presumed adequate fetal oxygenation documented (see flow record). Patient contraction every 2-4 minutes, palpate mild.   Response: Dr. Dunaway informed of patient history and contractions. Plan per provider is collect wet prep, ffn , UA/UC, given terb, betamethasone, and iv fluid bolus. Then, SVE after swabs collected, only send FFN if there is cervical change. Patient verbalized agreement with plan.

## 2022-06-16 NOTE — DISCHARGE INSTRUCTIONS
Discharge Instruction for Undelivered Patients      You were seen for:  Contractions  We Consulted: Dr. Dunaway  You had (Test or Medicine):Wet prep, UA     Diet:   regular     Activity:  Rest the pelvic area. No sex. Do not stimulate breasts or nipples.     Call your provider if you notice:  Swelling in your face or increased swelling in your hands or legs.  Headaches that are not relieved by Tylenol (acetaminophen).  Changes in your vision (blurring: seeing spots or stars.)  Nausea (sick to your stomach) and vomiting (throwing up).   Weight gain of 5 pounds or more per week.  Heartburn that doesn't go away.  Signs of bladder infection: pain when you urinate (use the toilet), need to go more often and more urgently.  The bag of landaverde (rupture of membranes) breaks, or you notice leaking in your underwear.  Bright red blood in your underwear.  Abdominal (lower belly) or stomach pain.  For first baby: Contractions (tightening) less than 5 minutes apart for one hour or more.  Second (plus) baby: Contractions (tightening) less than 10 minutes apart and getting stronger.  *If less than 34 weeks: Contractions (tightening) more than 6 times in one hour.  Increase or change in vaginal discharge (note the color and amount)      Follow-up:  Make an appointment to be seen on week of 6/20  with your provider

## 2022-06-16 NOTE — PROGRESS NOTES
Contractions have almost completely stopped after terb administration and IV fluid bolus. No cervical change noted from 2 days ago. FFN not sent due to lack of cervical change. UA is WNL. Wet prep positive for clue cells. Dr. Dunaway updated via phone, plan to order rx for antibiotics for bacterial vaginosis, pelvic rest, and follow-up in the clinic next week. Patient agreeable to plan and discharged to home.

## 2022-07-25 ENCOUNTER — HOSPITAL ENCOUNTER (OUTPATIENT)
Facility: HOSPITAL | Age: 27
End: 2022-07-25
Admitting: STUDENT IN AN ORGANIZED HEALTH CARE EDUCATION/TRAINING PROGRAM
Payer: COMMERCIAL

## 2022-07-25 ENCOUNTER — HOSPITAL ENCOUNTER (OUTPATIENT)
Facility: HOSPITAL | Age: 27
Discharge: HOME | End: 2022-07-25
Attending: STUDENT IN AN ORGANIZED HEALTH CARE EDUCATION/TRAINING PROGRAM | Admitting: STUDENT IN AN ORGANIZED HEALTH CARE EDUCATION/TRAINING PROGRAM
Payer: COMMERCIAL

## 2022-07-25 PROCEDURE — G0463 HOSPITAL OUTPT CLINIC VISIT: HCPCS

## 2022-07-25 RX ORDER — PYRIDOXINE HCL (VITAMIN B6) 25 MG
25 TABLET ORAL DAILY
Status: ON HOLD | COMMUNITY
End: 2022-09-08

## 2022-07-25 NOTE — DISCHARGE INSTRUCTIONS
Discharge Instruction for Undelivered Patients      You were seen for: Labor Assessment, Membrane Assessment, Bleeding Assessment, and Fetal Assessment  We Consulted: Dr Shauna El  You had (Test or Medicine) NST    Diet:   Drink 8 to 12 glasses of liquids (milk, juice, water) every day.  You may eat meals and snacks.     Activity:  Count fetal kicks everyday (see handout)     Call your provider if you notice:  Swelling in your face or increased swelling in your hands or legs.  Headaches that are not relieved by Tylenol (acetaminophen).  Changes in your vision (blurring: seeing spots or stars.)  Nausea (sick to your stomach) and vomiting (throwing up).   Weight gain of 5 pounds or more per week.  Heartburn that doesn't go away.  Signs of bladder infection: pain when you urinate (use the toilet), need to go more often and more urgently.  The bag of landaverde (rupture of membranes) breaks, or you notice leaking in your underwear.  Bright red blood in your underwear.  Abdominal (lower belly) or stomach pain.  For first baby: Contractions (tightening) less than 5 minutes apart for one hour or more.  Second (plus) baby: Contractions (tightening) less than 10 minutes apart and getting stronger.  *If less than 34 weeks: Contractions (tightening) more than 6 times in one hour.  Increase or change in vaginal discharge (note the color and amount)      Follow-up:  As scheduled in the clinic

## 2022-07-25 NOTE — PROGRESS NOTES
Pt arrives to unit ambulatory with SO. Complaints of coughing up blood this NOC. Appears in no distress. EFM and toco applied. Pt cleared for OB and discharged to the ER

## 2022-08-03 ENCOUNTER — TRANSFERRED RECORDS (OUTPATIENT)
Dept: HEALTH INFORMATION MANAGEMENT | Facility: CLINIC | Age: 27
End: 2022-08-03

## 2022-08-08 ENCOUNTER — HOSPITAL ENCOUNTER (OUTPATIENT)
Dept: ULTRASOUND IMAGING | Facility: HOSPITAL | Age: 27
Discharge: HOME | End: 2022-08-08
Attending: STUDENT IN AN ORGANIZED HEALTH CARE EDUCATION/TRAINING PROGRAM | Admitting: STUDENT IN AN ORGANIZED HEALTH CARE EDUCATION/TRAINING PROGRAM
Payer: COMMERCIAL

## 2022-08-08 DIAGNOSIS — O32.9XX0: ICD-10-CM

## 2022-08-08 PROCEDURE — 76817 TRANSVAGINAL US OBSTETRIC: CPT

## 2022-08-10 ENCOUNTER — TRANSFERRED RECORDS (OUTPATIENT)
Dept: HEALTH INFORMATION MANAGEMENT | Facility: CLINIC | Age: 27
End: 2022-08-10

## 2022-08-17 ENCOUNTER — LAB REQUISITION (OUTPATIENT)
Dept: LAB | Facility: CLINIC | Age: 27
End: 2022-08-17
Payer: COMMERCIAL

## 2022-08-17 DIAGNOSIS — O09.90 SUPERVISION OF HIGH RISK PREGNANCY, UNSPECIFIED, UNSPECIFIED TRIMESTER: ICD-10-CM

## 2022-08-17 PROCEDURE — 87653 STREP B DNA AMP PROBE: CPT | Mod: ORL | Performed by: STUDENT IN AN ORGANIZED HEALTH CARE EDUCATION/TRAINING PROGRAM

## 2022-08-18 LAB — GP B STREP DNA SPEC QL NAA+PROBE: NEGATIVE

## 2022-08-24 RX ORDER — ACETAMINOPHEN 325 MG/1
975 TABLET ORAL ONCE
Status: CANCELLED | OUTPATIENT
Start: 2022-08-24 | End: 2022-08-24

## 2022-08-24 RX ORDER — MISOPROSTOL 200 UG/1
800 TABLET ORAL
Status: CANCELLED | OUTPATIENT
Start: 2022-08-24

## 2022-08-24 RX ORDER — SODIUM CHLORIDE, SODIUM LACTATE, POTASSIUM CHLORIDE, CALCIUM CHLORIDE 600; 310; 30; 20 MG/100ML; MG/100ML; MG/100ML; MG/100ML
INJECTION, SOLUTION INTRAVENOUS CONTINUOUS
Status: CANCELLED | OUTPATIENT
Start: 2022-08-24

## 2022-08-24 RX ORDER — CEFAZOLIN SODIUM/WATER 2 G/20 ML
2 SYRINGE (ML) INTRAVENOUS SEE ADMIN INSTRUCTIONS
Status: CANCELLED | OUTPATIENT
Start: 2022-08-24

## 2022-08-24 RX ORDER — CEFAZOLIN SODIUM/WATER 2 G/20 ML
2 SYRINGE (ML) INTRAVENOUS
Status: CANCELLED | OUTPATIENT
Start: 2022-08-24

## 2022-08-24 RX ORDER — LIDOCAINE 40 MG/G
CREAM TOPICAL
Status: CANCELLED | OUTPATIENT
Start: 2022-08-24

## 2022-08-24 RX ORDER — CARBOPROST TROMETHAMINE 250 UG/ML
250 INJECTION, SOLUTION INTRAMUSCULAR
Status: CANCELLED | OUTPATIENT
Start: 2022-08-24

## 2022-08-24 RX ORDER — OXYTOCIN/0.9 % SODIUM CHLORIDE 30/500 ML
100-340 PLASTIC BAG, INJECTION (ML) INTRAVENOUS CONTINUOUS PRN
Status: CANCELLED | OUTPATIENT
Start: 2022-08-24

## 2022-08-24 RX ORDER — OXYTOCIN/0.9 % SODIUM CHLORIDE 30/500 ML
340 PLASTIC BAG, INJECTION (ML) INTRAVENOUS CONTINUOUS PRN
Status: CANCELLED | OUTPATIENT
Start: 2022-08-24

## 2022-08-24 RX ORDER — OXYTOCIN 10 [USP'U]/ML
10 INJECTION, SOLUTION INTRAMUSCULAR; INTRAVENOUS
Status: CANCELLED | OUTPATIENT
Start: 2022-08-24

## 2022-08-24 RX ORDER — CITRIC ACID/SODIUM CITRATE 334-500MG
30 SOLUTION, ORAL ORAL
Status: CANCELLED | OUTPATIENT
Start: 2022-08-24

## 2022-08-24 RX ORDER — MISOPROSTOL 200 UG/1
400 TABLET ORAL
Status: CANCELLED | OUTPATIENT
Start: 2022-08-24

## 2022-08-24 RX ORDER — METHYLERGONOVINE MALEATE 0.2 MG/ML
200 INJECTION INTRAVENOUS
Status: CANCELLED | OUTPATIENT
Start: 2022-08-24

## 2022-09-05 ENCOUNTER — HOSPITAL ENCOUNTER (OUTPATIENT)
Facility: HOSPITAL | Age: 27
Discharge: HOME | End: 2022-09-05
Attending: OBSTETRICS & GYNECOLOGY | Admitting: OBSTETRICS & GYNECOLOGY
Payer: COMMERCIAL

## 2022-09-05 LAB — SARS-COV-2 RNA RESP QL NAA+PROBE: NEGATIVE

## 2022-09-05 PROCEDURE — C9803 HOPD COVID-19 SPEC COLLECT: HCPCS

## 2022-09-05 PROCEDURE — U0003 INFECTIOUS AGENT DETECTION BY NUCLEIC ACID (DNA OR RNA); SEVERE ACUTE RESPIRATORY SYNDROME CORONAVIRUS 2 (SARS-COV-2) (CORONAVIRUS DISEASE [COVID-19]), AMPLIFIED PROBE TECHNIQUE, MAKING USE OF HIGH THROUGHPUT TECHNOLOGIES AS DESCRIBED BY CMS-2020-01-R: HCPCS | Performed by: OBSTETRICS & GYNECOLOGY

## 2022-09-05 PROCEDURE — 120N000001 HC R&B MED SURG/OB

## 2022-09-05 NOTE — PROGRESS NOTES
Pt here for covid test for scheduled c/s on Thursday of this week. Test collected and sent. Discharged to home.

## 2022-09-06 ENCOUNTER — PATIENT OUTREACH (OUTPATIENT)
Dept: CARE COORDINATION | Facility: CLINIC | Age: 27
End: 2022-09-06

## 2022-09-07 ENCOUNTER — PATIENT OUTREACH (OUTPATIENT)
Dept: CARE COORDINATION | Facility: CLINIC | Age: 27
End: 2022-09-07

## 2022-09-07 ENCOUNTER — HOSPITAL ENCOUNTER (OUTPATIENT)
Facility: HOSPITAL | Age: 27
Discharge: HOME | End: 2022-09-07
Attending: OBSTETRICS & GYNECOLOGY | Admitting: OBSTETRICS & GYNECOLOGY
Payer: COMMERCIAL

## 2022-09-07 VITALS — DIASTOLIC BLOOD PRESSURE: 70 MMHG | OXYGEN SATURATION: 98 % | TEMPERATURE: 98.2 F | SYSTOLIC BLOOD PRESSURE: 114 MMHG

## 2022-09-07 PROCEDURE — G0463 HOSPITAL OUTPT CLINIC VISIT: HCPCS

## 2022-09-07 RX ORDER — LIDOCAINE 40 MG/G
CREAM TOPICAL
Status: DISCONTINUED | OUTPATIENT
Start: 2022-09-07 | End: 2022-09-07 | Stop reason: HOSPADM

## 2022-09-07 ASSESSMENT — ACTIVITIES OF DAILY LIVING (ADL)
CONCENTRATING,_REMEMBERING_OR_MAKING_DECISIONS_DIFFICULTY: NO
WALKING_OR_CLIMBING_STAIRS_DIFFICULTY: NO
DRESSING/BATHING_DIFFICULTY: NO
DOING_ERRANDS_INDEPENDENTLY_DIFFICULTY: NO
FALL_HISTORY_WITHIN_LAST_SIX_MONTHS: NO
DIFFICULTY_COMMUNICATING: NO
DIFFICULTY_EATING/SWALLOWING: NO
CHANGE_IN_FUNCTIONAL_STATUS_SINCE_ONSET_OF_CURRENT_ILLNESS/INJURY: NO
HEARING_DIFFICULTY_OR_DEAF: NO
TOILETING_ISSUES: NO
WEAR_GLASSES_OR_BLIND: NO

## 2022-09-07 NOTE — DISCHARGE INSTRUCTIONS
Discharge Instruction for Undelivered Patients      You were seen for: Labor Assessment  We Consulted: Dr. Ortiz  / Dr. Grubbs  You had (Test or Medicine):NST     Diet:   Drink 8 to 12 glasses of liquids (milk, juice, water) every day.     Activity:  Count fetal kicks everyday (see handout)     Call your provider if you notice:  Swelling in your face or increased swelling in your hands or legs.  Headaches that are not relieved by Tylenol (acetaminophen).  Changes in your vision (blurring: seeing spots or stars.)  Nausea (sick to your stomach) and vomiting (throwing up).   Weight gain of 5 pounds or more per week.  Heartburn that doesn't go away.  Signs of bladder infection: pain when you urinate (use the toilet), need to go more often and more urgently.  The bag of landaverde (rupture of membranes) breaks, or you notice leaking in your underwear.  Bright red blood in your underwear.  Abdominal (lower belly) or stomach pain.  For first baby: Contractions (tightening) less than 5 minutes apart for one hour or more.  Second (plus) baby: Contractions (tightening) less than 10 minutes apart and getting stronger.  *If less than 34 weeks: Contractions (tightening) more than 6 times in one hour.  Increase or change in vaginal discharge (note the color and amount)  Other: Call [facility] if having need further evaluation    Follow-up:  Arrive at 0730 at [facility] on 9/7/2022

## 2022-09-07 NOTE — PROGRESS NOTES
Clinic Care Coordination Contact  [facility]: Post-Discharge Note  SITUATION                                                      Admission:    Admission Date: 09/05/22   Reason for Admission: Covid test  Discharge:   Discharge Date: 09/05/22  Discharge Diagnosis: Covid test    BACKGROUND                                                      Pt here for covid test for scheduled c/s on Thursday of this week. Test collected and sent. Discharged to home.     ASSESSMENT           Discharge Assessment  How are you doing now that you are home?: Patient shares that she is doing well and is ready for scheduled c section tomorrow. No questions or concerns  How are your symptoms? (Red Flag symptoms escalate to triage hotline per guidelines): Improved  Do you feel your condition is stable enough to be safe at home until your provider visit?: Yes  Does the patient have their discharge instructions? : Yes  Does the patient have questions regarding their discharge instructions? : No  Were you started on any new medications or were there changes to any of your previous medications? : No  Does the patient have all of their medications?: Yes  Do you have questions regarding any of your medications? : No  Do you have all of your needed medical supplies or equipment (DME)?  (i.e. oxygen tank, CPAP, cane, etc.): Yes  Discharge follow-up appointment scheduled within 14 calendar days? : Yes  Discharge Follow Up Appointment Date: 09/08/22  Discharge Follow Up Appointment Scheduled with?: Specialty Care Provider    Post-op ([facility] Only)  If the patient had a surgery or procedure, do they have any questions for a nurse?: No    Post-op (Clinicians Only)  Did the patient have surgery or a procedure: No  Fever: No  Chills: No        PLAN                                                      Outpatient Plan: Scheduled for C section on Thursday, 9/8.    No future appointments.      For any urgent concerns, please contact our 24 hour nurse  triage line: [phone] ([phone])         LUIS Langston

## 2022-09-07 NOTE — PROGRESS NOTES
Order for discharge received.  Instructions reviewed with Gabriela and she is OK with plan.  Also reviewed pre-op instructions for the morning.

## 2022-09-07 NOTE — PROGRESS NOTES
"Data: Gabriela Eason a 26 year old  39w4d [facility]: 2022  1:06 PM.  Reason for maternal/fetal assessment is contractions. Patient reports uterine contractions that began at \"about 7am\".  She states they were about 30 minutes apart until mid morning, then were 15 minutes apart and at \"around 12 I noticed some were 4 minutes apart\". VSS. Fetal movement present. Patient denies leaking of vaginal fluid/rupture of membranes, vaginal bleeding, pelvic pressure, nausea, vomiting, headache, visual disturbances, epigastric or URQ pain. Support person is present.   Action: Verbal consent for EFM and monitors placed upon arrival. Triage assessment completed. Bill of rights reviewed.  Response: Category 1 FHR. Dr. Ortiz not available so Dr. Romie POTTS updated to above and orders received: Continue to Monitor  /Will evaluate medical history and call back with further plan. Patient verbalized agreement.    Fiona Diaz RN  2022 1:44 PM    "

## 2022-09-08 ENCOUNTER — ANESTHESIA EVENT (OUTPATIENT)
Dept: OBGYN | Facility: HOSPITAL | Age: 27
End: 2022-09-08
Payer: COMMERCIAL

## 2022-09-08 ENCOUNTER — ANESTHESIA (OUTPATIENT)
Dept: OBGYN | Facility: HOSPITAL | Age: 27
End: 2022-09-08
Payer: COMMERCIAL

## 2022-09-08 ENCOUNTER — HOSPITAL ENCOUNTER (INPATIENT)
Facility: HOSPITAL | Age: 27
LOS: 2 days | Discharge: HOME | End: 2022-09-10
Attending: OBSTETRICS & GYNECOLOGY | Admitting: OBSTETRICS & GYNECOLOGY
Payer: COMMERCIAL

## 2022-09-08 DIAGNOSIS — Z98.891 S/P CESAREAN SECTION: Primary | ICD-10-CM

## 2022-09-08 PROBLEM — O33.9 CPD (CEPHALO-PELVIC DISPROPORTION): Status: ACTIVE | Noted: 2022-09-08

## 2022-09-08 LAB
ABO/RH(D): NORMAL
ANTIBODY SCREEN: NEGATIVE
HGB BLD-MCNC: 12 G/DL (ref 11.7–15.7)
SPECIMEN EXPIRATION DATE: NORMAL
T PALLIDUM AB SER QL: NONREACTIVE

## 2022-09-08 PROCEDURE — 250N000009 HC RX 250: Performed by: PHYSICIAN ASSISTANT

## 2022-09-08 PROCEDURE — 272N000001 HC OR GENERAL SUPPLY STERILE: Performed by: OBSTETRICS & GYNECOLOGY

## 2022-09-08 PROCEDURE — 250N000013 HC RX MED GY IP 250 OP 250 PS 637: Performed by: OBSTETRICS & GYNECOLOGY

## 2022-09-08 PROCEDURE — 250N000011 HC RX IP 250 OP 636: Performed by: PHYSICIAN ASSISTANT

## 2022-09-08 PROCEDURE — 999N000249 HC STATISTIC C-SECTION ON UNIT

## 2022-09-08 PROCEDURE — 258N000003 HC RX IP 258 OP 636: Performed by: PHYSICIAN ASSISTANT

## 2022-09-08 PROCEDURE — 86780 TREPONEMA PALLIDUM: CPT | Performed by: PHYSICIAN ASSISTANT

## 2022-09-08 PROCEDURE — 250N000011 HC RX IP 250 OP 636

## 2022-09-08 PROCEDURE — 370N000017 HC ANESTHESIA TECHNICAL FEE, PER MIN: Performed by: OBSTETRICS & GYNECOLOGY

## 2022-09-08 PROCEDURE — 86850 RBC ANTIBODY SCREEN: CPT | Performed by: PHYSICIAN ASSISTANT

## 2022-09-08 PROCEDURE — 258N000003 HC RX IP 258 OP 636: Performed by: ANESTHESIOLOGY

## 2022-09-08 PROCEDURE — 360N000076 HC SURGERY LEVEL 3, PER MIN: Performed by: OBSTETRICS & GYNECOLOGY

## 2022-09-08 PROCEDURE — 250N000011 HC RX IP 250 OP 636: Performed by: ANESTHESIOLOGY

## 2022-09-08 PROCEDURE — 250N000011 HC RX IP 250 OP 636: Performed by: OBSTETRICS & GYNECOLOGY

## 2022-09-08 PROCEDURE — 85018 HEMOGLOBIN: CPT | Performed by: PHYSICIAN ASSISTANT

## 2022-09-08 PROCEDURE — 250N000013 HC RX MED GY IP 250 OP 250 PS 637: Performed by: PHYSICIAN ASSISTANT

## 2022-09-08 PROCEDURE — 120N000001 HC R&B MED SURG/OB

## 2022-09-08 PROCEDURE — 36415 COLL VENOUS BLD VENIPUNCTURE: CPT | Performed by: PHYSICIAN ASSISTANT

## 2022-09-08 PROCEDURE — C9290 INJ, BUPIVACAINE LIPOSOME: HCPCS | Performed by: ANESTHESIOLOGY

## 2022-09-08 PROCEDURE — 86901 BLOOD TYPING SEROLOGIC RH(D): CPT | Performed by: PHYSICIAN ASSISTANT

## 2022-09-08 RX ORDER — PROCHLORPERAZINE 25 MG
25 SUPPOSITORY, RECTAL RECTAL EVERY 12 HOURS PRN
Status: DISCONTINUED | OUTPATIENT
Start: 2022-09-08 | End: 2022-09-10 | Stop reason: HOSPADM

## 2022-09-08 RX ORDER — MISOPROSTOL 200 UG/1
400 TABLET ORAL
Status: DISCONTINUED | OUTPATIENT
Start: 2022-09-08 | End: 2022-09-08

## 2022-09-08 RX ORDER — ONDANSETRON 2 MG/ML
INJECTION INTRAMUSCULAR; INTRAVENOUS PRN
Status: DISCONTINUED | OUTPATIENT
Start: 2022-09-08 | End: 2022-09-08

## 2022-09-08 RX ORDER — MISOPROSTOL 200 UG/1
800 TABLET ORAL
Status: DISCONTINUED | OUTPATIENT
Start: 2022-09-08 | End: 2022-09-10 | Stop reason: HOSPADM

## 2022-09-08 RX ORDER — FENTANYL CITRATE 50 UG/ML
INJECTION, SOLUTION INTRAMUSCULAR; INTRAVENOUS
Status: COMPLETED | OUTPATIENT
Start: 2022-09-08 | End: 2022-09-08

## 2022-09-08 RX ORDER — LIDOCAINE 40 MG/G
CREAM TOPICAL
Status: DISCONTINUED | OUTPATIENT
Start: 2022-09-08 | End: 2022-09-10 | Stop reason: HOSPADM

## 2022-09-08 RX ORDER — MODIFIED LANOLIN
OINTMENT (GRAM) TOPICAL
Status: DISCONTINUED | OUTPATIENT
Start: 2022-09-08 | End: 2022-09-10 | Stop reason: HOSPADM

## 2022-09-08 RX ORDER — OXYCODONE HYDROCHLORIDE 5 MG/1
5 TABLET ORAL EVERY 4 HOURS PRN
Status: DISCONTINUED | OUTPATIENT
Start: 2022-09-08 | End: 2022-09-10 | Stop reason: HOSPADM

## 2022-09-08 RX ORDER — BISACODYL 10 MG
10 SUPPOSITORY, RECTAL RECTAL DAILY PRN
Status: DISCONTINUED | OUTPATIENT
Start: 2022-09-10 | End: 2022-09-10 | Stop reason: HOSPADM

## 2022-09-08 RX ORDER — NALOXONE HYDROCHLORIDE 0.4 MG/ML
0.2 INJECTION, SOLUTION INTRAMUSCULAR; INTRAVENOUS; SUBCUTANEOUS
Status: DISCONTINUED | OUTPATIENT
Start: 2022-09-08 | End: 2022-09-10 | Stop reason: HOSPADM

## 2022-09-08 RX ORDER — KETOROLAC TROMETHAMINE 30 MG/ML
30 INJECTION, SOLUTION INTRAMUSCULAR; INTRAVENOUS EVERY 6 HOURS
Status: DISCONTINUED | OUTPATIENT
Start: 2022-09-08 | End: 2022-09-09 | Stop reason: DRUGHIGH

## 2022-09-08 RX ORDER — MORPHINE SULFATE 1 MG/ML
INJECTION, SOLUTION EPIDURAL; INTRATHECAL; INTRAVENOUS
Status: COMPLETED | OUTPATIENT
Start: 2022-09-08 | End: 2022-09-08

## 2022-09-08 RX ORDER — FAMOTIDINE 20 MG/1
40 TABLET, FILM COATED ORAL 2 TIMES DAILY
COMMUNITY
End: 2023-12-03

## 2022-09-08 RX ORDER — CEFAZOLIN SODIUM/WATER 2 G/20 ML
2 SYRINGE (ML) INTRAVENOUS
Status: COMPLETED | OUTPATIENT
Start: 2022-09-08 | End: 2022-09-08

## 2022-09-08 RX ORDER — IBUPROFEN 800 MG/1
800 TABLET, FILM COATED ORAL EVERY 6 HOURS
Status: DISCONTINUED | OUTPATIENT
Start: 2022-09-09 | End: 2022-09-09

## 2022-09-08 RX ORDER — AMOXICILLIN 250 MG
2 CAPSULE ORAL 2 TIMES DAILY
Status: DISCONTINUED | OUTPATIENT
Start: 2022-09-08 | End: 2022-09-10 | Stop reason: HOSPADM

## 2022-09-08 RX ORDER — ONDANSETRON 2 MG/ML
4 INJECTION INTRAMUSCULAR; INTRAVENOUS EVERY 6 HOURS PRN
Status: DISCONTINUED | OUTPATIENT
Start: 2022-09-08 | End: 2022-09-10 | Stop reason: HOSPADM

## 2022-09-08 RX ORDER — AMOXICILLIN 250 MG
1 CAPSULE ORAL 2 TIMES DAILY
Status: DISCONTINUED | OUTPATIENT
Start: 2022-09-08 | End: 2022-09-10 | Stop reason: HOSPADM

## 2022-09-08 RX ORDER — NALOXONE HYDROCHLORIDE 0.4 MG/ML
0.4 INJECTION, SOLUTION INTRAMUSCULAR; INTRAVENOUS; SUBCUTANEOUS
Status: DISCONTINUED | OUTPATIENT
Start: 2022-09-08 | End: 2022-09-10 | Stop reason: HOSPADM

## 2022-09-08 RX ORDER — SODIUM CHLORIDE, SODIUM LACTATE, POTASSIUM CHLORIDE, CALCIUM CHLORIDE 600; 310; 30; 20 MG/100ML; MG/100ML; MG/100ML; MG/100ML
INJECTION, SOLUTION INTRAVENOUS CONTINUOUS
Status: DISCONTINUED | OUTPATIENT
Start: 2022-09-08 | End: 2022-09-08

## 2022-09-08 RX ORDER — CARBOPROST TROMETHAMINE 250 UG/ML
250 INJECTION, SOLUTION INTRAMUSCULAR
Status: DISCONTINUED | OUTPATIENT
Start: 2022-09-08 | End: 2022-09-10 | Stop reason: HOSPADM

## 2022-09-08 RX ORDER — CARBOPROST TROMETHAMINE 250 UG/ML
250 INJECTION, SOLUTION INTRAMUSCULAR
Status: DISCONTINUED | OUTPATIENT
Start: 2022-09-08 | End: 2022-09-08

## 2022-09-08 RX ORDER — DEXAMETHASONE SODIUM PHOSPHATE 10 MG/ML
INJECTION, SOLUTION INTRAMUSCULAR; INTRAVENOUS PRN
Status: DISCONTINUED | OUTPATIENT
Start: 2022-09-08 | End: 2022-09-08

## 2022-09-08 RX ORDER — CITRIC ACID/SODIUM CITRATE 334-500MG
30 SOLUTION, ORAL ORAL
Status: DISCONTINUED | OUTPATIENT
Start: 2022-09-08 | End: 2022-09-08

## 2022-09-08 RX ORDER — CEFAZOLIN SODIUM/WATER 2 G/20 ML
2 SYRINGE (ML) INTRAVENOUS SEE ADMIN INSTRUCTIONS
Status: DISCONTINUED | OUTPATIENT
Start: 2022-09-08 | End: 2022-09-08

## 2022-09-08 RX ORDER — LIDOCAINE HYDROCHLORIDE 10 MG/ML
1 INJECTION, SOLUTION EPIDURAL; INFILTRATION; INTRACAUDAL; PERINEURAL ONCE
Status: DISCONTINUED | OUTPATIENT
Start: 2022-09-08 | End: 2022-09-08

## 2022-09-08 RX ORDER — OXYTOCIN/0.9 % SODIUM CHLORIDE 30/500 ML
100-340 PLASTIC BAG, INJECTION (ML) INTRAVENOUS CONTINUOUS PRN
Status: DISCONTINUED | OUTPATIENT
Start: 2022-09-08 | End: 2022-09-10 | Stop reason: HOSPADM

## 2022-09-08 RX ORDER — MISOPROSTOL 200 UG/1
800 TABLET ORAL
Status: DISCONTINUED | OUTPATIENT
Start: 2022-09-08 | End: 2022-09-08

## 2022-09-08 RX ORDER — OXYTOCIN 10 [USP'U]/ML
10 INJECTION, SOLUTION INTRAMUSCULAR; INTRAVENOUS
Status: DISCONTINUED | OUTPATIENT
Start: 2022-09-08 | End: 2022-09-10 | Stop reason: HOSPADM

## 2022-09-08 RX ORDER — MISOPROSTOL 200 UG/1
400 TABLET ORAL
Status: DISCONTINUED | OUTPATIENT
Start: 2022-09-08 | End: 2022-09-10 | Stop reason: HOSPADM

## 2022-09-08 RX ORDER — OXYTOCIN 10 [USP'U]/ML
10 INJECTION, SOLUTION INTRAMUSCULAR; INTRAVENOUS
Status: DISCONTINUED | OUTPATIENT
Start: 2022-09-08 | End: 2022-09-08

## 2022-09-08 RX ORDER — DEXTROSE, SODIUM CHLORIDE, SODIUM LACTATE, POTASSIUM CHLORIDE, AND CALCIUM CHLORIDE 5; .6; .31; .03; .02 G/100ML; G/100ML; G/100ML; G/100ML; G/100ML
INJECTION, SOLUTION INTRAVENOUS CONTINUOUS
Status: DISCONTINUED | OUTPATIENT
Start: 2022-09-08 | End: 2022-09-10 | Stop reason: HOSPADM

## 2022-09-08 RX ORDER — METOCLOPRAMIDE 10 MG/1
10 TABLET ORAL EVERY 6 HOURS PRN
Status: DISCONTINUED | OUTPATIENT
Start: 2022-09-08 | End: 2022-09-10 | Stop reason: HOSPADM

## 2022-09-08 RX ORDER — METHYLERGONOVINE MALEATE 0.2 MG/ML
200 INJECTION INTRAVENOUS
Status: DISCONTINUED | OUTPATIENT
Start: 2022-09-08 | End: 2022-09-10 | Stop reason: HOSPADM

## 2022-09-08 RX ORDER — SIMETHICONE 80 MG
80 TABLET,CHEWABLE ORAL 4 TIMES DAILY PRN
Status: DISCONTINUED | OUTPATIENT
Start: 2022-09-08 | End: 2022-09-10 | Stop reason: HOSPADM

## 2022-09-08 RX ORDER — KETOROLAC TROMETHAMINE 30 MG/ML
INJECTION, SOLUTION INTRAMUSCULAR; INTRAVENOUS PRN
Status: DISCONTINUED | OUTPATIENT
Start: 2022-09-08 | End: 2022-09-08

## 2022-09-08 RX ORDER — OXYTOCIN/0.9 % SODIUM CHLORIDE 30/500 ML
340 PLASTIC BAG, INJECTION (ML) INTRAVENOUS CONTINUOUS PRN
Status: DISCONTINUED | OUTPATIENT
Start: 2022-09-08 | End: 2022-09-10 | Stop reason: HOSPADM

## 2022-09-08 RX ORDER — ONDANSETRON 4 MG/1
4 TABLET, ORALLY DISINTEGRATING ORAL EVERY 6 HOURS PRN
Status: DISCONTINUED | OUTPATIENT
Start: 2022-09-08 | End: 2022-09-10 | Stop reason: HOSPADM

## 2022-09-08 RX ORDER — HYDROCORTISONE 25 MG/G
CREAM TOPICAL 3 TIMES DAILY PRN
Status: DISCONTINUED | OUTPATIENT
Start: 2022-09-08 | End: 2022-09-10 | Stop reason: HOSPADM

## 2022-09-08 RX ORDER — OXYTOCIN/0.9 % SODIUM CHLORIDE 30/500 ML
340 PLASTIC BAG, INJECTION (ML) INTRAVENOUS CONTINUOUS PRN
Status: COMPLETED | OUTPATIENT
Start: 2022-09-08 | End: 2022-09-08

## 2022-09-08 RX ORDER — BUPIVACAINE HYDROCHLORIDE 7.5 MG/ML
INJECTION, SOLUTION INTRASPINAL
Status: COMPLETED | OUTPATIENT
Start: 2022-09-08 | End: 2022-09-08

## 2022-09-08 RX ORDER — BUPIVACAINE HYDROCHLORIDE 2.5 MG/ML
INJECTION, SOLUTION EPIDURAL; INFILTRATION; INTRACAUDAL
Status: DISCONTINUED | OUTPATIENT
Start: 2022-09-08 | End: 2022-09-08

## 2022-09-08 RX ORDER — METHYLERGONOVINE MALEATE 0.2 MG/ML
200 INJECTION INTRAVENOUS
Status: DISCONTINUED | OUTPATIENT
Start: 2022-09-08 | End: 2022-09-08

## 2022-09-08 RX ORDER — ACETAMINOPHEN 325 MG/1
975 TABLET ORAL ONCE
Status: COMPLETED | OUTPATIENT
Start: 2022-09-08 | End: 2022-09-08

## 2022-09-08 RX ORDER — LIDOCAINE 40 MG/G
CREAM TOPICAL
Status: DISCONTINUED | OUTPATIENT
Start: 2022-09-08 | End: 2022-09-08

## 2022-09-08 RX ORDER — PROCHLORPERAZINE MALEATE 10 MG
10 TABLET ORAL EVERY 6 HOURS PRN
Status: DISCONTINUED | OUTPATIENT
Start: 2022-09-08 | End: 2022-09-10 | Stop reason: HOSPADM

## 2022-09-08 RX ORDER — METOCLOPRAMIDE HYDROCHLORIDE 5 MG/ML
10 INJECTION INTRAMUSCULAR; INTRAVENOUS EVERY 6 HOURS PRN
Status: DISCONTINUED | OUTPATIENT
Start: 2022-09-08 | End: 2022-09-10 | Stop reason: HOSPADM

## 2022-09-08 RX ORDER — KETOROLAC TROMETHAMINE 30 MG/ML
INJECTION, SOLUTION INTRAMUSCULAR; INTRAVENOUS
Status: DISPENSED
Start: 2022-09-08 | End: 2022-09-09

## 2022-09-08 RX ORDER — ACETAMINOPHEN 325 MG/1
975 TABLET ORAL EVERY 6 HOURS
Status: DISCONTINUED | OUTPATIENT
Start: 2022-09-08 | End: 2022-09-10 | Stop reason: HOSPADM

## 2022-09-08 RX ADMIN — Medication 0.15 MG: at 12:45

## 2022-09-08 RX ADMIN — Medication 100 ML/HR: at 14:45

## 2022-09-08 RX ADMIN — BUPIVACAINE HYDROCHLORIDE IN DEXTROSE 1.6 ML: 7.5 INJECTION, SOLUTION SUBARACHNOID at 12:45

## 2022-09-08 RX ADMIN — FENTANYL CITRATE 15 MCG: 50 INJECTION, SOLUTION INTRAMUSCULAR; INTRAVENOUS at 12:45

## 2022-09-08 RX ADMIN — ACETAMINOPHEN 975 MG: 325 TABLET, FILM COATED ORAL at 10:55

## 2022-09-08 RX ADMIN — PHENYLEPHRINE HYDROCHLORIDE 0.5 MCG/KG/MIN: 10 INJECTION INTRAVENOUS at 12:43

## 2022-09-08 RX ADMIN — SENNOSIDES AND DOCUSATE SODIUM 1 TABLET: 50; 8.6 TABLET ORAL at 20:05

## 2022-09-08 RX ADMIN — Medication 2 G: at 12:42

## 2022-09-08 RX ADMIN — KETOROLAC TROMETHAMINE 30 MG: 30 INJECTION, SOLUTION INTRAMUSCULAR; INTRAVENOUS at 20:05

## 2022-09-08 RX ADMIN — Medication 300 ML/HR: at 13:02

## 2022-09-08 RX ADMIN — SODIUM CHLORIDE, POTASSIUM CHLORIDE, SODIUM LACTATE AND CALCIUM CHLORIDE 200 ML/HR: 600; 310; 30; 20 INJECTION, SOLUTION INTRAVENOUS at 08:30

## 2022-09-08 RX ADMIN — BUPIVACAINE HYDROCHLORIDE 10 ML: 2.5 INJECTION, SOLUTION EPIDURAL; INFILTRATION; INTRACAUDAL at 13:20

## 2022-09-08 RX ADMIN — BUPIVACAINE HYDROCHLORIDE 10 ML: 2.5 INJECTION, SOLUTION EPIDURAL; INFILTRATION; INTRACAUDAL at 13:23

## 2022-09-08 RX ADMIN — ONDANSETRON 4 MG: 2 INJECTION INTRAMUSCULAR; INTRAVENOUS at 12:42

## 2022-09-08 RX ADMIN — ACETAMINOPHEN 975 MG: 325 TABLET, FILM COATED ORAL at 17:46

## 2022-09-08 RX ADMIN — KETOROLAC TROMETHAMINE 30 MG: 30 INJECTION, SOLUTION INTRAMUSCULAR at 13:13

## 2022-09-08 RX ADMIN — BUPIVACAINE 10 ML: 13.3 INJECTION, SUSPENSION, LIPOSOMAL INFILTRATION at 13:20

## 2022-09-08 RX ADMIN — BUPIVACAINE 10 ML: 13.3 INJECTION, SUSPENSION, LIPOSOMAL INFILTRATION at 13:23

## 2022-09-08 RX ADMIN — SODIUM CHLORIDE, POTASSIUM CHLORIDE, SODIUM LACTATE AND CALCIUM CHLORIDE: 600; 310; 30; 20 INJECTION, SOLUTION INTRAVENOUS at 12:54

## 2022-09-08 RX ADMIN — DEXAMETHASONE SODIUM PHOSPHATE 5 MG: 10 INJECTION, SOLUTION INTRAMUSCULAR; INTRAVENOUS at 12:36

## 2022-09-08 ASSESSMENT — ACTIVITIES OF DAILY LIVING (ADL)
CONCENTRATING,_REMEMBERING_OR_MAKING_DECISIONS_DIFFICULTY: NO
ADLS_ACUITY_SCORE: 18
ADLS_ACUITY_SCORE: 18
DIFFICULTY_COMMUNICATING: NO
HEARING_DIFFICULTY_OR_DEAF: NO
WALKING_OR_CLIMBING_STAIRS_DIFFICULTY: NO
DOING_ERRANDS_INDEPENDENTLY_DIFFICULTY: NO
FALL_HISTORY_WITHIN_LAST_SIX_MONTHS: NO
ADLS_ACUITY_SCORE: 18
TOILETING_ISSUES: NO
ADLS_ACUITY_SCORE: 18
ADLS_ACUITY_SCORE: 18
WEAR_GLASSES_OR_BLIND: NO
CHANGE_IN_FUNCTIONAL_STATUS_SINCE_ONSET_OF_CURRENT_ILLNESS/INJURY: NO
ADLS_ACUITY_SCORE: 18
DIFFICULTY_EATING/SWALLOWING: NO
DRESSING/BATHING_DIFFICULTY: NO

## 2022-09-08 ASSESSMENT — LIFESTYLE VARIABLES: TOBACCO_USE: 0

## 2022-09-08 NOTE — ANESTHESIA PROCEDURE NOTES
Intrathecal injection Procedure Note    Pre-Procedure   Staff -        Anesthesiologist:  Tanya Antoine MD       Performed By: anesthesiologist       Location: OR       Procedure Start/Stop Times: 9/8/2022 12:39 PM and 9/8/2022 12:45 PM       Pre-Anesthestic Checklist: patient identified, IV checked, risks and benefits discussed, informed consent, monitors and equipment checked, pre-op evaluation, at physician/surgeon's request and post-op pain management  Timeout:       Correct Patient: Yes        Correct Procedure: Yes        Correct Site: Yes        Correct Position: Yes   Procedure Documentation  Procedure: intrathecal injection       Patient Position: sitting       Skin prep: Chloraprep       Insertion Site: L3-4. (midline approach).       Needle Gauge: 24.        Needle Length (Inches): 4        Spinal Needle Type: Sprotte       Introducer used       Introducer: 20 G       # of attempts: 1 and  # of redirects:  1    Assessment/Narrative         Paresthesias: No.       CSF fluid: clear.       Opening pressure was cmH2O while  Sitting.      Medication(s) Administered   0.75% Hyperbaric Bupivacaine (Intrathecal) - Intrathecal   1.6 mL - 9/8/2022 12:45:00 PM  Fentanyl PF (Intrathecal) - Intrathecal   15 mcg - 9/8/2022 12:45:00 PM  Morphine PF 1 mg/mL (Intrathecal) - Intrathecal   0.15 mg - 9/8/2022 12:45:00 PM  Medication Administration Time: 9/8/2022 12:39 PM

## 2022-09-08 NOTE — H&P
OB ADMISSION H&P - C SECTION     Date: 2022  NAME: Gabriela Eason   : 1995  MRN: [id number]     CC: scheduled c section      HPI: Gabriela Eason is a 26 year old  with  single inter-uterine gestation at 39w5d, with Estimated Date of Delivery: Sep 10, 2022 admitted today for primary csection.  section secondary to hx of pneumothorax. Patient feels well. Has no complaints and reports good fetal movement. Pregnancy has been complicated by None. Patient denies headache, visual changes, RUQ pain. She denies contractions, leakage of fluid, or vaginal bleeding. Please see prenatal records.     OB HISTORY   OB History    Para Term  AB Living   1 0 0 0 0 0   SAB IAB Ectopic Multiple Live Births   0 0 0 0 0      # Outcome Date GA Lbr Riaz/2nd Weight Sex Delivery Anes PTL Lv   1 Current                PAST MEDICAL HISTORY  Past Medical History:   Diagnosis Date     Acute respiratory failure (H) 2017     Anxiety      Depressive disorder      PONV (postoperative nausea and vomiting)      Posttraumatic stress disorder      Status post thoracostomy tube placement (H) 2017        PAST SURGICAL HISTORY:   Past Surgical History:   Procedure Laterality Date     THORACOSCOPY Right 2017    Procedure: RIGHT VIDEO ASSISTED THORACOSCOPY WITH MECHANICAL PLEURODESIS AND APICAL PLEURECTOMY THROUGH A SUBXYPHIOD APPROACH;  Surgeon: Ricky Hernandez MD;  Location: [facility];  Service:      TONSILLECTOMY Bilateral      ZZC THORACOSCOPY,DX NO BX Left 2020    Procedure: THORACOSCOPY, LEFT LUNG WEDGE RESECTION, PLEURODESIS, CHEST TUBE PLACEMENT;  Surgeon: Dax Arias MD;  Location: [facility];  Service: General        SOCIAL HISTORY   Reviewed, patient denies smoking, alcohol and drug use  She is  . Father is  involved    MEDICATIONS  Current Facility-Administered Medications   Medication     acetaminophen (TYLENOL) tablet 975 mg     carboprost (HEMABATE)  "injection 250 mcg     ceFAZolin Sodium (ANCEF) injection 2 g     ceFAZolin Sodium (ANCEF) injection 2 g     lactated ringers infusion     lidocaine (LMX4) cream     lidocaine 1 % 0.1-1 mL     methylergonovine (METHERGINE) injection 200 mcg     misoprostol (CYTOTEC) tablet 400 mcg    Or     misoprostol (CYTOTEC) tablet 800 mcg     oxytocin (PITOCIN) 30 units in 500 mL 0.9% NaCl infusion     oxytocin (PITOCIN) injection 10 Units     sodium chloride (PF) 0.9% PF flush 3 mL     sodium chloride (PF) 0.9% PF flush 3 mL     sodium citrate-citric acid (BICITRA) solution 30 mL     tranexamic acid (CYKLOKAPRON) bolus 1 g vial attach to NaCl 50 or 100 mL bag ADULT       ALLERGIES  Allergies   Allergen Reactions     Adhesive Tape Rash       ROS: otherwise negative except what is stated in HPI.     PHYSICAL EXAM   /67   Temp 98.1  F (36.7  C) (Oral)   Resp 14   Ht 1.702 m (5' 7\")   Wt 67.6 kg (149 lb)   Breastfeeding No   BMI 23.34 kg/m     Gen: no acute distress, resting comfortably   CV: acyanotic   Heart: regular rate and rhythm   Pulm: unlabored respirations, clear to ausculation bilaterally    Abd: gravid, soft, nontender   Extremities: soft, nontender   FHR: positive, category 1  Garber: no contractions      LABS  @LABRSLTOB(ABORH EXT,LN-ABORH,HML ABO/RH,HGB EXT,HGB,RUBELLA EXT,LN-RUBELLA IGG ANTIBODY:Last:1)@     IMPRESSION:   26 year old [unfilled] at 39w5d   single inter uterine pregnancy at term   Pregnancy complications include: None   section secondary to risk of pneumothorax    PLAN:   - Admit to hospital  - Type and screen/hgb  - NPO   - Proceed with  section   - anesthesia notified       RISK - C SECTION  Patient counseled on risks, benefits, alternatives and expectations of  section.  Risks detailed to include, but not be limited to:  Pain, bleeding, infection, anesthesia complications, possible injury to bowel, bladder, baby and/or adjacent tissues, possible need for blood " transfusion (with 1/50,000 risk of bloodborne pathogen [HIV and/or Hepatitis B/C] transmission) or even hysterectomy.  Patient voiced understanding of all R/B/A/E and has agreed to proceed with  section for delivery if needed or recommended.    Michael Ortiz MD

## 2022-09-08 NOTE — PLAN OF CARE
Primary c/section for hx of lung problems. In good spirits, calm and ready to meet her , she is accompanied by he boyfriend and FOB Vu

## 2022-09-08 NOTE — ANESTHESIA POSTPROCEDURE EVALUATION
"Patient: Gabriela Eason    Procedure: Procedure(s):  PRIMARY  SECTION       Anesthesia Type:  Spinal    Note:  Disposition: Outpatient   Postop Pain Control: Uneventful            Sign Out: Well controlled pain   PONV: No   Neuro/Psych: Uneventful            Sign Out: Acceptable/Baseline neuro status   Airway/Respiratory: Uneventful            Sign Out: Acceptable/Baseline resp. status   CV/Hemodynamics: Uneventful            Sign Out: Acceptable CV status; No obvious hypovolemia; No obvious fluid overload   Other NRE: NONE   DID A NON-ROUTINE EVENT OCCUR? No    Event details/Postop Comments:  /71   Temp 36.4  C (97.6  F) (Oral)   Resp 14   Ht 1.702 m (5' 7\")   Wt 67.6 kg (149 lb)   SpO2 97%   Breastfeeding Unknown   BMI 23.34 kg/m               Last vitals:  Vitals:    22 1621 22 1624 22 1629   BP: 123/71     Resp:      Temp:      SpO2:  96% 97%       Electronically Signed By: Tanya Antoine MD  2022  5:02 PM  "

## 2022-09-08 NOTE — PLAN OF CARE
Problem: Bleeding (Postpartum  Delivery)  Goal: Hemostasis  Outcome: Ongoing, Progressing     Problem: Pain (Postpartum  Delivery)  Goal: Acceptable Pain Control  Outcome: Ongoing, Progressing     Problem: Postoperative Nausea and Vomiting (Postpartum  Delivery)  Goal: Nausea and Vomiting Relief  Outcome: Ongoing, Progressing   Gabriela denies any Nausea, or pain. Her bleeding is well controlled. She is progressing in her post op cares with no issues.

## 2022-09-08 NOTE — ANESTHESIA PROCEDURE NOTES
TAP Procedure Note    Pre-Procedure   Staff -        Anesthesiologist:  Tanya Antoine MD       Performed By: anesthesiologist       Location: OR       Procedure Start/Stop Times: 9/8/2022 1:15 PM and 9/8/2022 1:20 PM       Pre-Anesthestic Checklist: patient identified, IV checked, risks and benefits discussed, informed consent, monitors and equipment checked, pre-op evaluation, at physician/surgeon's request and post-op pain management  Timeout:       Correct Patient: Yes        Correct Procedure: Yes        Correct Site: Yes        Correct Position: Yes        Correct Laterality: Yes        Site Marked: N/A  Procedure Documentation  Procedure: TAP       Diagnosis: POST-OP CS       Laterality: bilateral       Patient Position: supine       Skin prep: Chloraprep       Needle Type: insulated       Needle Gauge: 21.        Needle Length (Inches): 4        Ultrasound guided       1. Ultrasound was used to identify targeted nerve, plexus, vascular marker, or fascial plane and place a needle adjacent to it in real-time.       2. Ultrasound was used to visualize the spread of anesthetic in close proximity to the above referenced structure.       3. A permanent image is entered into the patient's record.       4. The visualized anatomic structures appeared normal.       5. There were no apparent abnormal pathologic findings.    Assessment/Narrative         The placement was negative for: blood aspirated, painful injection and site bleeding       Paresthesias: No.       Bolus given via needle..        Secured via.        Insertion/Infusion Method: Single Shot       Complications: none    Medication(s) Administered   Bupivacaine 0.25% PF (Infiltration) - Infiltration   10 mL - 9/8/2022 1:20:00 PM   10 mL - 9/8/2022 1:23:00 PM  Bupivacaine liposome (Exparel) 1.3% LA inj susp (Infiltration) - Infiltration   10 mL - 9/8/2022 1:20:00 PM   10 mL - 9/8/2022 1:23:00 PM  Medication Administration Time: 9/8/2022 1:15 PM

## 2022-09-08 NOTE — OP NOTE
PROCEDURE NOTE:      NAME:  Gabriela Eason   RECORD # [id number]   ADMIT DATE: 2022    DATE OF SERVICE: 2022     PREOPERATIVE DIAGNOSIS: IUP 39 weeks, hx of pneumothorax    PROCEDURE: Low transverse  section      SURGEON:  Michael Ortiz MD     ASSISTANT: OR staff    ANESTHESIA: spinal    Delivery QBL (mL): 352     DRAINS: Cho catheter.    COMPLICATIONS: None    FINDINGS: Normal uterus, tubes and ovaries bilateral. Normal appearance to the adnexae.  Live male infant born, Apgars of 8   at 1 minute, 9  at 5 minutes,  Weight (oz):  109  oz.    CONSENT: Patient was met preoperatively where we discussed the procedure and the risks associated with the procedure.  She understood these to include but not limited to injury to adjacent organs including bowel, bladder, ureter, infection and bleeding. Understanding these risks her consents were signed.      PROCEDURE: Patient was brought to the operating room in stable condition.  After induction of a spinal anesthetic, fetal heart tones were checked and were stable. She was prepped and draped in sterile fashion for the procedure.  A timeout was then performed.      A pfannensteil skin incision was made to the level of the fascia.  The fascia was incised laterally. Kocher clamps were applied to the superior aspect of the incision which was sharply dissected from the rectus muscles.  The kocher clamps were then reapplied to the inferior aspect of the incision which was similarly sharply dissected from the rectus muscles.  The rectus muscles were  bluntly in the midline.  The peritoneum was entered sharply. This incision was then extended.  A bladder blade was introduced. The vesicouterine peritoneum was identified and a bladder flap was not created.  A low transverse uterine incision was made and amniotic sac was ruptured revealing clear amniotic fluid.  The baby's head was then delivered.There was not a nuchal cord noted. There was  a spontaneous cry and therefore bulb suction was performed. The remainder of the infant was easily delivered. The cord was clamped x 2 and cut and the infant handed off to waiting nursing personnel.    The placenta was then manually removed from the uterus.  The uterus was exteriorized, covered with a moist laparotomy sponge and cleared of all clots and debris.  The uterine incision was closed with 0 chromic from both angles in a running locking suture and  A second imbricating suture of 0 was used for hemostasis. The uterus was returned to the abdominopelvic cavity.  The pericolic gutters were cleared of all clots and debris.  The uterine incision was again inspected and noted to be hemostatic.  The peritoneum was closed with suture superiorly to inferiorly.  The rectus muscles were made hemostatic with the use of electrocautery and brought together with figure-of-8 sutures of suture, the fascia was brought together with PDS loop from both angles in a running nonlocking suture, met at the midline, the subcutaneous tissues were irrigated, made hemostatic with use of electrocautery and brought together with 3-0 plain.  Skin was closed with staples.  Patient tolerated this procedure well.  Sponge, lap and needle counts were correct x two.    Michael Ortiz MD        CC: Clinic, [facility], MD Shauna Nichole MD

## 2022-09-08 NOTE — ANESTHESIA CARE TRANSFER NOTE
Patient: Gabriela Eason    Procedure: Procedure(s):  PRIMARY  SECTION       Diagnosis: High-risk pregnancy, third trimester [O09.93]  Diagnosis Additional Information: No value filed.    Anesthesia Type:   Spinal     Note:    Oropharynx: oropharynx clear of all foreign objects  Level of Consciousness: awake  Oxygen Supplementation: room air    Independent Airway: airway patency satisfactory and stable  Dentition: dentition unchanged  Vital Signs Stable: post-procedure vital signs reviewed and stable  Report to RN Given: handoff report given  Patient transferred to: Labor and Delivery    Handoff Report: Identifed the Patient, Identified the Reponsible Provider, Reviewed the pertinent medical history, Discussed the surgical course, Reviewed Intra-OP anesthesia mangement and issues during anesthesia, Set expectations for post-procedure period and Allowed opportunity for questions and acknowledgement of understanding      Vitals:  Vitals Value Taken Time   /57 22 1334   Temp 97.6    Pulse 78    Resp 14    SpO2 98    Vitals shown include unvalidated device data.    Electronically Signed By: [place]  2022  1:36 PM

## 2022-09-08 NOTE — ANESTHESIA PREPROCEDURE EVALUATION
Anesthesia Pre-Procedure Evaluation    Patient: Gabriela Eason   MRN: [id number] : 1995        Procedure : Procedure(s):  PRIMARY  SECTION          Past Medical History:   Diagnosis Date     Acute respiratory failure (H) 2017     Anxiety      Depressive disorder      PONV (postoperative nausea and vomiting)      Posttraumatic stress disorder      Status post thoracostomy tube placement (H) 2017      Past Surgical History:   Procedure Laterality Date     THORACOSCOPY Right 2017    Procedure: RIGHT VIDEO ASSISTED THORACOSCOPY WITH MECHANICAL PLEURODESIS AND APICAL PLEURECTOMY THROUGH A SUBXYPHIOD APPROACH;  Surgeon: Ricky Hernandez MD;  Location: Health system;  Service:      TONSILLECTOMY Bilateral      ZZC THORACOSCOPY,DX NO BX Left 2020    Procedure: THORACOSCOPY, LEFT LUNG WEDGE RESECTION, PLEURODESIS, CHEST TUBE PLACEMENT;  Surgeon: Dax Arias MD;  Location: [facility] OR;  Service: General      Allergies   Allergen Reactions     Adhesive Tape Rash      Social History     Tobacco Use     Smoking status: Never Smoker     Smokeless tobacco: Never Used   Substance Use Topics     Alcohol use: No      Wt Readings from Last 1 Encounters:   22 67.6 kg (149 lb)        Anesthesia Evaluation   Pt has had prior anesthetic. Type: General.    History of anesthetic complications  - PONV.      ROS/MED HX  ENT/Pulmonary: Comment: IMPRESSION:   1.  Trace pneumothorax distributed fairly equally throughout the right hemithorax allowing for difference in technique unchanged since this morning's earlier radiograph.   2.  The right upper lobe apical visceral pleura appears tethered to the anterior apical parietal pleura at the wedge resection staple line.   3.  A 2.5 cm right hepatic lobe lesion that could represent focal nodular hyperplasia, flash fill hemangioma or less likely hepatic adenoma. Recommend nonurgent MRI liver using Eovist.   (-) tobacco use    Neurologic: Comment: Mild concussion early in pg      Cardiovascular:  - neg cardiovascular ROS     METS/Exercise Tolerance: >4 METS    Hematologic:  - neg hematologic  ROS     Musculoskeletal:  - neg musculoskeletal ROS     GI/Hepatic:     (+) GERD,     Renal/Genitourinary:  - neg Renal ROS     Endo:  - neg endo ROS     Psychiatric/Substance Use:     (+) psychiatric history anxiety, depression and other (comment) (PTSD) Recreational drug usage: Cannabis.    Infectious Disease:  - neg infectious disease ROS     Malignancy:  - neg malignancy ROS     Other:      (+) Possibly pregnant, ,         Physical Exam    Airway  airway exam normal      Mallampati: II   TM distance: > 3 FB   Neck ROM: full   Mouth opening: > 3 cm    Respiratory Devices and Support         Dental  no notable dental history         Cardiovascular   cardiovascular exam normal       Rhythm and rate: regular and normal     Pulmonary   pulmonary exam normal        breath sounds clear to auscultation           OUTSIDE LABS:  CBC:   Lab Results   Component Value Date    WBC 9.7 05/28/2022    WBC 5.4 03/11/2022    HGB 12.0 09/08/2022    HGB 12.8 05/28/2022    HCT 38.3 05/28/2022    HCT 38.0 03/11/2022     05/28/2022     03/11/2022     BMP:   Lab Results   Component Value Date     (L) 05/28/2022     03/11/2022    POTASSIUM 3.8 05/28/2022    POTASSIUM 4.0 03/11/2022    CHLORIDE 103 05/28/2022    CHLORIDE 105 03/11/2022    CO2 19 (L) 05/28/2022    CO2 24 03/11/2022    BUN 6 (L) 05/28/2022    BUN 7 (L) 03/11/2022    CR 0.56 (L) 05/28/2022    CR 0.64 03/11/2022    GLC 89 05/28/2022     03/11/2022     COAGS: No results found for: PTT, INR, FIBR  POC:   Lab Results   Component Value Date    HCG Negative 03/16/2020     HEPATIC:   Lab Results   Component Value Date    ALBUMIN 3.3 (L) 05/28/2022    PROTTOTAL 7.1 05/28/2022    ALT <9 05/28/2022    AST 15 05/28/2022    ALKPHOS 72 05/28/2022    BILITOTAL 0.3 05/28/2022     OTHER:    Lab Results   Component Value Date    PH 7.40 03/06/2022    JENNY 8.9 05/28/2022    MAG 1.7 (L) 03/11/2022    LIPASE 27 05/28/2022       Anesthesia Plan    ASA Status:  2      Anesthesia Type: Spinal.              Consents    Anesthesia Plan(s) and associated risks, benefits, and realistic alternatives discussed. Questions answered and patient/representative(s) expressed understanding.    - Discussed:     - Discussed with:  Patient      - Extended Intubation/Ventilatory Support Discussed: No.      - Patient is DNR/DNI Status: No         Postoperative Care    Pain management: intrathecal morphine, Peripheral nerve block (Single Shot).   PONV prophylaxis: Ondansetron (or other 5HT-3)     Comments:                Tanya Antoine MD

## 2022-09-09 LAB — HGB BLD-MCNC: 10.5 G/DL (ref 11.7–15.7)

## 2022-09-09 PROCEDURE — 250N000011 HC RX IP 250 OP 636: Performed by: OBSTETRICS & GYNECOLOGY

## 2022-09-09 PROCEDURE — 250N000013 HC RX MED GY IP 250 OP 250 PS 637: Performed by: OBSTETRICS & GYNECOLOGY

## 2022-09-09 PROCEDURE — 120N000001 HC R&B MED SURG/OB

## 2022-09-09 PROCEDURE — 85018 HEMOGLOBIN: CPT | Performed by: OBSTETRICS & GYNECOLOGY

## 2022-09-09 PROCEDURE — 36415 COLL VENOUS BLD VENIPUNCTURE: CPT | Performed by: OBSTETRICS & GYNECOLOGY

## 2022-09-09 RX ORDER — IBUPROFEN 800 MG/1
800 TABLET, FILM COATED ORAL EVERY 6 HOURS
Status: DISCONTINUED | OUTPATIENT
Start: 2022-09-09 | End: 2022-09-10 | Stop reason: HOSPADM

## 2022-09-09 RX ADMIN — IBUPROFEN 800 MG: 800 TABLET ORAL at 20:17

## 2022-09-09 RX ADMIN — OXYCODONE HYDROCHLORIDE 5 MG: 5 TABLET ORAL at 23:24

## 2022-09-09 RX ADMIN — SIMETHICONE 80 MG: 80 TABLET, CHEWABLE ORAL at 23:24

## 2022-09-09 RX ADMIN — IBUPROFEN 800 MG: 800 TABLET ORAL at 14:20

## 2022-09-09 RX ADMIN — SENNOSIDES AND DOCUSATE SODIUM 1 TABLET: 50; 8.6 TABLET ORAL at 08:19

## 2022-09-09 RX ADMIN — IBUPROFEN 800 MG: 800 TABLET ORAL at 08:19

## 2022-09-09 RX ADMIN — ACETAMINOPHEN 975 MG: 325 TABLET, FILM COATED ORAL at 06:35

## 2022-09-09 RX ADMIN — ACETAMINOPHEN 975 MG: 325 TABLET, FILM COATED ORAL at 00:34

## 2022-09-09 RX ADMIN — ACETAMINOPHEN 975 MG: 325 TABLET, FILM COATED ORAL at 18:36

## 2022-09-09 RX ADMIN — KETOROLAC TROMETHAMINE 30 MG: 30 INJECTION, SOLUTION INTRAMUSCULAR; INTRAVENOUS at 02:13

## 2022-09-09 RX ADMIN — ACETAMINOPHEN 975 MG: 325 TABLET, FILM COATED ORAL at 12:30

## 2022-09-09 ASSESSMENT — ACTIVITIES OF DAILY LIVING (ADL)
ADLS_ACUITY_SCORE: 18

## 2022-09-09 NOTE — PLAN OF CARE
Gabriela is stable, her pain is being controlled with tylenol and ibuprofen.  Gabriela has been up and ambulated in the hallx2.  She is bottle feeding her son and that is going well.  Her SO is at the bedside and is supportive and attentive to both their needs.   Problem: Pain (Postpartum  Delivery)  Goal: Acceptable Pain Control  Outcome: Ongoing, Progressing  Intervention: Prevent or Manage Pain  Recent Flowsheet Documentation  Taken 2022 1230 by Cora Hernandez, RN  Pain Management Interventions:   emotional support   medication (see MAR)  Taken 2022 0936 by Cora Hernandez, RN  Pain Management Interventions:   emotional support   quiet environment facilitated     Problem: Postoperative Nausea and Vomiting (Postpartum  Delivery)  Goal: Nausea and Vomiting Relief  Outcome: Ongoing, Progressing

## 2022-09-09 NOTE — PROGRESS NOTES
Gabriela is still sitting up and would like to stay up longer, she is comfortable and visiting with friends. He dominguez remains in and the plan is to remove it when she gets back into bed

## 2022-09-09 NOTE — PLAN OF CARE
Problem: Adjustment to Role Transition (Postpartum  Delivery)  Goal: Successful Maternal Role Transition  Outcome: Ongoing, Progressing   Pt vs are WNL. Ambulating independently. Bottle feeding baby. Fundus firm

## 2022-09-09 NOTE — PROGRESS NOTES
"Csection - Post operative day 1    ASSESSMENT: PLAN:   POD#1    Primary section for maternal history of pneumothorax  Doing well  Continue routine cares       SUBJECTIVE:    The patient feels well: Catheter is out, bleeding decreased, tolerating normal diet, and passing flatus.  Pain is well controlled. The patient has no emotional concerns.  The baby is well and being fed    OBJECTIVE:  BP 91/57 (BP Location: Left arm)   Pulse 74   Temp 98.3  F (36.8  C) (Oral)   Resp 16   Ht 1.702 m (5' 7\")   Wt 67.6 kg (149 lb)   SpO2 96%   Breastfeeding Unknown   BMI 23.34 kg/m      Fundus firm  Incision- dressing dry   Ext- nontender      Lab  Hemoglobin   Date Value Ref Range Status   09/09/2022 10.5 (L) 11.7 - 15.7 g/dL Final   ]        Brenda Baxter MD  [facility]  [phone]    "

## 2022-09-10 VITALS
WEIGHT: 149 LBS | RESPIRATION RATE: 16 BRPM | OXYGEN SATURATION: 96 % | HEIGHT: 67 IN | HEART RATE: 85 BPM | BODY MASS INDEX: 23.39 KG/M2 | DIASTOLIC BLOOD PRESSURE: 58 MMHG | TEMPERATURE: 98.5 F | SYSTOLIC BLOOD PRESSURE: 93 MMHG

## 2022-09-10 PROCEDURE — 250N000013 HC RX MED GY IP 250 OP 250 PS 637: Performed by: OBSTETRICS & GYNECOLOGY

## 2022-09-10 RX ORDER — ACETAMINOPHEN 325 MG/1
975 TABLET ORAL EVERY 6 HOURS
COMMUNITY
Start: 2022-09-10 | End: 2023-12-03

## 2022-09-10 RX ORDER — IBUPROFEN 800 MG/1
800 TABLET, FILM COATED ORAL EVERY 6 HOURS
COMMUNITY
Start: 2022-09-10 | End: 2023-12-03

## 2022-09-10 RX ORDER — OXYCODONE HYDROCHLORIDE 5 MG/1
5 TABLET ORAL EVERY 4 HOURS PRN
Qty: 12 TABLET | Refills: 0 | Status: SHIPPED | OUTPATIENT
Start: 2022-09-10 | End: 2023-12-03

## 2022-09-10 RX ADMIN — ACETAMINOPHEN 975 MG: 325 TABLET, FILM COATED ORAL at 00:44

## 2022-09-10 RX ADMIN — IBUPROFEN 800 MG: 800 TABLET ORAL at 03:15

## 2022-09-10 RX ADMIN — ACETAMINOPHEN 975 MG: 325 TABLET, FILM COATED ORAL at 06:43

## 2022-09-10 RX ADMIN — IBUPROFEN 800 MG: 800 TABLET ORAL at 10:33

## 2022-09-10 RX ADMIN — SENNOSIDES AND DOCUSATE SODIUM 1 TABLET: 50; 8.6 TABLET ORAL at 10:34

## 2022-09-10 ASSESSMENT — ACTIVITIES OF DAILY LIVING (ADL)
ADLS_ACUITY_SCORE: 18

## 2022-09-10 NOTE — DISCHARGE SUMMARY
Discharge Summary    Patient Name:  Gabriela Eason  :      1995  MRN:      [id number]    Admission Date: 2022  Delivery Date: 2022  Gestational Age at Delivery: 39w5d  Discharge Date: 9/10/22    Patient Active Problem List   Diagnosis     Tension pneumothorax     Tension pneumothorax, spontaneous     Acute respiratory failure (H)     Status post thoracostomy tube placement (H)     Constipation     Pneumothorax     Chest tube in place     Pneumothorax on right     Encounter for triage in pregnant patient     CPD (cephalo-pelvic disproportion)       Conditions Complicating Pregnancy: None    Primary Procedure performed:  Section, due to history of pneumothorax     Other Procedures performed: None    Condition at discharge:  Stable    Discharge Plan:     Follow-up with Primary Obstetrician in 2 weeks and 6 weeks  Instructions:   No heavy lifting for 6 weeks   Regular diet   Medications: PNV, IBU, Tylenol, Oxycodone    Provider:  Nadege Dockery M.D.    Date:  9/10/2022  Time:  10:03 AM

## 2022-09-10 NOTE — PLAN OF CARE
Patient has met postpartum recovery discharge goals and orders recovered to discharge home. Instructions received and questions answered. Patient discharge home ambulatory with partner and baby

## 2022-09-10 NOTE — PLAN OF CARE
Problem: Adjustment to Role Transition (Postpartum  Delivery)  Goal: Successful Maternal Role Transition  Outcome: Ongoing, Progressing  Intervention: Support Maternal Role Transition  Recent Flowsheet Documentation  Taken 2022 1600 by Dana Pritchett RN  Supportive Measures: active listening utilized  Parent/Child Attachment Promotion:    caring behavior modeled    interaction encouraged     Problem: Bleeding (Postpartum  Delivery)  Goal: Hemostasis  Outcome: Ongoing, Progressing     Problem: Pain (Postpartum  Delivery)  Goal: Acceptable Pain Control  Outcome: Ongoing, Progressing     Problem: Postoperative Urinary Retention (Postpartum  Delivery)  Goal: Effective Urinary Elimination  Outcome: Ongoing, Progressing     VSS and aferile, FFU/1 with scant lochia. Dressing has two unchanged dots of dried drainage. Ambulating and voiding independently. [place] and attentive toward baby.

## 2022-09-10 NOTE — PROGRESS NOTES
"Postpartum Day 2:       Subjective:  The patient feels well. The patient has no emotional concerns. Pain is well controlled with current medications. The patient is ambulating well. She is voiding and passing gas.The amount and color of the lochia is appropriate for the duration of recovery, patient denies clots. The baby is well.    Objective   The patient has a blood pressure which is within the normal range. Urinary output is adequate.     Exam:   BP 93/58 (BP Location: Left arm, Patient Position: Semi-Betts's, Cuff Size: Adult Regular)   Pulse 85   Temp 98.5  F (36.9  C) (Oral)   Resp 16   Ht 1.702 m (5' 7\")   Wt 67.6 kg (149 lb)   SpO2 96%   Breastfeeding Unknown   BMI 23.34 kg/m    General: NAD  Abdomen: soft, NT  Fundus: firm, nontender  Incision: Bandage on  Ext: no pain       Impression:   Normal postpartum course. POD#2, LTCS secondary to primary elective given history of pneumothorax    Plan:   - Continue current care.  - Doing well  - Home today    Nadege Dockery M.D.    "

## 2022-09-10 NOTE — PLAN OF CARE
Patient delivered by  on . Vital signs have been stable. Postpartum checks have been normal. Mepilex dressing is clean and intact with no drainage. Bleeding has been light. Voiding without difficulty. Pain has been managed with Oxycodone, Motrin and Tylenol and have been given around the clock. Mother bonding well with infant and father of baby is supportive.

## 2022-09-10 NOTE — PLAN OF CARE
Updated Dr. Lewis that pt scored 12 on Grundy Depression Scale and social work consult was ordered. Spoke with pt and she is set up with outpatient counseling and is not currently taking any medication for mood. Let pt know that SW would most likely be stopping by to make sure she has all the resources she needs for post partum.

## 2022-09-12 ENCOUNTER — PATIENT OUTREACH (OUTPATIENT)
Dept: CARE COORDINATION | Facility: CLINIC | Age: 27
End: 2022-09-12

## 2022-09-12 NOTE — PROGRESS NOTES
"SW met with parents to complete assessment and address consult.  This is the first baby for both parents.  Parents reported that they have sufficient supply of baby items.  Parents reported support from paternal grandmother, friends, each other, and maternal cousin Jillian and aunt Nena (both RNs and both live in [place]).  Both parents work full time.  JON is a home health aide through [employer].  FOSTEPHANIE has two weeks of paternity leave.  MOB reports that she has a WIC appointment on Monday.  MOB reports she plans to apply for SNAP benefits while she is on leave.  FOB reports being a new dad is \"weird\" and \"new\".  Mom reports history of right pneumothorax that she has had three times now.  She states this is why she had a  as the risk from natural birth of getting another pneumothorax was too high.  She states baby is doing well and today ate 30 ml or more.  Regarding her depression, she states that she is doing all right.  She does not have any suicidal ideation or plan.  She reports seeing the same therapist for over a year.  They have weekly virtual sessions.  She states that her therapist is flexible and can see her more often if needed.  She states that she has tried antidepressants in the past but they usually make her feel worse.  Besides therapy, she practices regular meditation.        MARIE Hunt, AYESHA 22 7:27 AM        "

## 2022-09-12 NOTE — PROGRESS NOTES
Clinic Care Coordination Contact  Care Team Conversations    JER POSEY performed chart review and noted the pt has contacted and in been in contact with the PCP already. JER POSEY will not be outreaching to the pt at this time.     JER POSEY will wait for a new referral to come through if needed.     LUIS Slater  Social Work Care Coordinator - [facility]  Care Coordination  [email or web address]  Cell Phone: [phone]  Gender pronouns: she/her  Employed by [employer]

## 2022-09-25 ENCOUNTER — HEALTH MAINTENANCE LETTER (OUTPATIENT)
Age: 27
End: 2022-09-25

## 2022-10-27 ENCOUNTER — ANESTHESIA EVENT (OUTPATIENT)
Dept: SURGERY | Facility: HOSPITAL | Age: 27
End: 2022-10-27
Payer: COMMERCIAL

## 2022-10-28 ENCOUNTER — HOSPITAL ENCOUNTER (OUTPATIENT)
Facility: HOSPITAL | Age: 27
Discharge: HOME | End: 2022-10-28
Attending: OBSTETRICS & GYNECOLOGY | Admitting: OBSTETRICS & GYNECOLOGY
Payer: COMMERCIAL

## 2022-10-28 ENCOUNTER — ANESTHESIA (OUTPATIENT)
Dept: SURGERY | Facility: HOSPITAL | Age: 27
End: 2022-10-28
Payer: COMMERCIAL

## 2022-10-28 VITALS
SYSTOLIC BLOOD PRESSURE: 96 MMHG | BODY MASS INDEX: 20.45 KG/M2 | WEIGHT: 130.6 LBS | RESPIRATION RATE: 14 BRPM | DIASTOLIC BLOOD PRESSURE: 63 MMHG | HEART RATE: 78 BPM | TEMPERATURE: 97.2 F | OXYGEN SATURATION: 100 %

## 2022-10-28 DIAGNOSIS — N97.1 TUBAL OCCLUSION: Primary | ICD-10-CM

## 2022-10-28 LAB
HCG UR QL: NEGATIVE
HGB BLD-MCNC: 12.6 G/DL (ref 11.7–15.7)

## 2022-10-28 PROCEDURE — 250N000011 HC RX IP 250 OP 636: Performed by: ANESTHESIOLOGY

## 2022-10-28 PROCEDURE — 258N000003 HC RX IP 258 OP 636: Performed by: OBSTETRICS & GYNECOLOGY

## 2022-10-28 PROCEDURE — 258N000003 HC RX IP 258 OP 636: Performed by: ANESTHESIOLOGY

## 2022-10-28 PROCEDURE — 710N000009 HC RECOVERY PHASE 1, LEVEL 1, PER MIN: Performed by: OBSTETRICS & GYNECOLOGY

## 2022-10-28 PROCEDURE — 250N000013 HC RX MED GY IP 250 OP 250 PS 637: Performed by: PHYSICIAN ASSISTANT

## 2022-10-28 PROCEDURE — 250N000009 HC RX 250: Performed by: ANESTHESIOLOGY

## 2022-10-28 PROCEDURE — 250N000009 HC RX 250: Performed by: NURSE ANESTHETIST, CERTIFIED REGISTERED

## 2022-10-28 PROCEDURE — 88302 TISSUE EXAM BY PATHOLOGIST: CPT | Mod: TC | Performed by: OBSTETRICS & GYNECOLOGY

## 2022-10-28 PROCEDURE — 250N000011 HC RX IP 250 OP 636: Performed by: NURSE ANESTHETIST, CERTIFIED REGISTERED

## 2022-10-28 PROCEDURE — 360N000077 HC SURGERY LEVEL 4, PER MIN: Performed by: OBSTETRICS & GYNECOLOGY

## 2022-10-28 PROCEDURE — 81025 URINE PREGNANCY TEST: CPT | Performed by: PHYSICIAN ASSISTANT

## 2022-10-28 PROCEDURE — 36415 COLL VENOUS BLD VENIPUNCTURE: CPT | Performed by: PHYSICIAN ASSISTANT

## 2022-10-28 PROCEDURE — 710N000012 HC RECOVERY PHASE 2, PER MINUTE: Performed by: OBSTETRICS & GYNECOLOGY

## 2022-10-28 PROCEDURE — 272N000001 HC OR GENERAL SUPPLY STERILE: Performed by: OBSTETRICS & GYNECOLOGY

## 2022-10-28 PROCEDURE — 258N000003 HC RX IP 258 OP 636: Performed by: NURSE ANESTHETIST, CERTIFIED REGISTERED

## 2022-10-28 PROCEDURE — 370N000017 HC ANESTHESIA TECHNICAL FEE, PER MIN: Performed by: OBSTETRICS & GYNECOLOGY

## 2022-10-28 PROCEDURE — 999N000141 HC STATISTIC PRE-PROCEDURE NURSING ASSESSMENT: Performed by: OBSTETRICS & GYNECOLOGY

## 2022-10-28 PROCEDURE — 250N000013 HC RX MED GY IP 250 OP 250 PS 637: Performed by: ANESTHESIOLOGY

## 2022-10-28 PROCEDURE — 85018 HEMOGLOBIN: CPT | Performed by: PHYSICIAN ASSISTANT

## 2022-10-28 PROCEDURE — 250N000011 HC RX IP 250 OP 636: Performed by: OBSTETRICS & GYNECOLOGY

## 2022-10-28 RX ORDER — SODIUM CHLORIDE, SODIUM LACTATE, POTASSIUM CHLORIDE, CALCIUM CHLORIDE 600; 310; 30; 20 MG/100ML; MG/100ML; MG/100ML; MG/100ML
INJECTION, SOLUTION INTRAVENOUS CONTINUOUS
Status: DISCONTINUED | OUTPATIENT
Start: 2022-10-28 | End: 2022-10-28 | Stop reason: HOSPADM

## 2022-10-28 RX ORDER — DEXAMETHASONE SODIUM PHOSPHATE 10 MG/ML
INJECTION, SOLUTION INTRAMUSCULAR; INTRAVENOUS PRN
Status: DISCONTINUED | OUTPATIENT
Start: 2022-10-28 | End: 2022-10-28

## 2022-10-28 RX ORDER — FENTANYL CITRATE 50 UG/ML
25 INJECTION, SOLUTION INTRAMUSCULAR; INTRAVENOUS
Status: DISCONTINUED | OUTPATIENT
Start: 2022-10-28 | End: 2022-10-28 | Stop reason: HOSPADM

## 2022-10-28 RX ORDER — GLYCOPYRROLATE 0.2 MG/ML
INJECTION, SOLUTION INTRAMUSCULAR; INTRAVENOUS PRN
Status: DISCONTINUED | OUTPATIENT
Start: 2022-10-28 | End: 2022-10-28

## 2022-10-28 RX ORDER — FENTANYL CITRATE 50 UG/ML
INJECTION, SOLUTION INTRAMUSCULAR; INTRAVENOUS PRN
Status: DISCONTINUED | OUTPATIENT
Start: 2022-10-28 | End: 2022-10-28

## 2022-10-28 RX ORDER — ONDANSETRON 2 MG/ML
4 INJECTION INTRAMUSCULAR; INTRAVENOUS EVERY 30 MIN PRN
Status: DISCONTINUED | OUTPATIENT
Start: 2022-10-28 | End: 2022-10-28 | Stop reason: HOSPADM

## 2022-10-28 RX ORDER — OXYCODONE HYDROCHLORIDE 5 MG/1
5 TABLET ORAL EVERY 4 HOURS PRN
Status: DISCONTINUED | OUTPATIENT
Start: 2022-10-28 | End: 2022-10-28 | Stop reason: HOSPADM

## 2022-10-28 RX ORDER — NALOXONE HYDROCHLORIDE 0.4 MG/ML
0.4 INJECTION, SOLUTION INTRAMUSCULAR; INTRAVENOUS; SUBCUTANEOUS
Status: DISCONTINUED | OUTPATIENT
Start: 2022-10-28 | End: 2022-10-28 | Stop reason: HOSPADM

## 2022-10-28 RX ORDER — ACETAMINOPHEN 325 MG/1
975 TABLET ORAL ONCE
Status: DISCONTINUED | OUTPATIENT
Start: 2022-10-28 | End: 2022-10-28 | Stop reason: HOSPADM

## 2022-10-28 RX ORDER — IBUPROFEN 200 MG
800 TABLET ORAL ONCE
Status: DISCONTINUED | OUTPATIENT
Start: 2022-10-28 | End: 2022-10-28 | Stop reason: HOSPADM

## 2022-10-28 RX ORDER — SODIUM CHLORIDE, SODIUM LACTATE, POTASSIUM CHLORIDE, AND CALCIUM CHLORIDE .6; .31; .03; .02 G/100ML; G/100ML; G/100ML; G/100ML
IRRIGANT IRRIGATION PRN
Status: DISCONTINUED | OUTPATIENT
Start: 2022-10-28 | End: 2022-10-28 | Stop reason: HOSPADM

## 2022-10-28 RX ORDER — PROPOFOL 10 MG/ML
INJECTION, EMULSION INTRAVENOUS CONTINUOUS PRN
Status: DISCONTINUED | OUTPATIENT
Start: 2022-10-28 | End: 2022-10-28

## 2022-10-28 RX ORDER — NALOXONE HYDROCHLORIDE 0.4 MG/ML
0.2 INJECTION, SOLUTION INTRAMUSCULAR; INTRAVENOUS; SUBCUTANEOUS
Status: DISCONTINUED | OUTPATIENT
Start: 2022-10-28 | End: 2022-10-28 | Stop reason: HOSPADM

## 2022-10-28 RX ORDER — PROPOFOL 10 MG/ML
INJECTION, EMULSION INTRAVENOUS PRN
Status: DISCONTINUED | OUTPATIENT
Start: 2022-10-28 | End: 2022-10-28

## 2022-10-28 RX ORDER — LIDOCAINE HYDROCHLORIDE 10 MG/ML
INJECTION, SOLUTION INFILTRATION; PERINEURAL PRN
Status: DISCONTINUED | OUTPATIENT
Start: 2022-10-28 | End: 2022-10-28

## 2022-10-28 RX ORDER — HALOPERIDOL 5 MG/ML
1 INJECTION INTRAMUSCULAR
Status: DISCONTINUED | OUTPATIENT
Start: 2022-10-28 | End: 2022-10-28 | Stop reason: HOSPADM

## 2022-10-28 RX ORDER — KETOROLAC TROMETHAMINE 30 MG/ML
INJECTION, SOLUTION INTRAMUSCULAR; INTRAVENOUS PRN
Status: DISCONTINUED | OUTPATIENT
Start: 2022-10-28 | End: 2022-10-28

## 2022-10-28 RX ORDER — OXYCODONE HYDROCHLORIDE 5 MG/1
5 TABLET ORAL
Status: DISCONTINUED | OUTPATIENT
Start: 2022-10-28 | End: 2022-10-28 | Stop reason: HOSPADM

## 2022-10-28 RX ORDER — ACETAMINOPHEN 325 MG/1
975 TABLET ORAL ONCE
Status: COMPLETED | OUTPATIENT
Start: 2022-10-28 | End: 2022-10-28

## 2022-10-28 RX ORDER — ONDANSETRON 2 MG/ML
INJECTION INTRAMUSCULAR; INTRAVENOUS PRN
Status: DISCONTINUED | OUTPATIENT
Start: 2022-10-28 | End: 2022-10-28

## 2022-10-28 RX ORDER — ONDANSETRON 4 MG/1
4 TABLET, ORALLY DISINTEGRATING ORAL EVERY 30 MIN PRN
Status: DISCONTINUED | OUTPATIENT
Start: 2022-10-28 | End: 2022-10-28 | Stop reason: HOSPADM

## 2022-10-28 RX ORDER — MAGNESIUM SULFATE 4 G/50ML
4 INJECTION INTRAVENOUS ONCE
Status: COMPLETED | OUTPATIENT
Start: 2022-10-28 | End: 2022-10-28

## 2022-10-28 RX ORDER — FENTANYL CITRATE 50 UG/ML
50 INJECTION, SOLUTION INTRAMUSCULAR; INTRAVENOUS EVERY 5 MIN PRN
Status: DISCONTINUED | OUTPATIENT
Start: 2022-10-28 | End: 2022-10-28 | Stop reason: HOSPADM

## 2022-10-28 RX ORDER — HYDROMORPHONE HYDROCHLORIDE 1 MG/ML
0.2 INJECTION, SOLUTION INTRAMUSCULAR; INTRAVENOUS; SUBCUTANEOUS EVERY 5 MIN PRN
Status: DISCONTINUED | OUTPATIENT
Start: 2022-10-28 | End: 2022-10-28 | Stop reason: HOSPADM

## 2022-10-28 RX ORDER — OXYCODONE HYDROCHLORIDE 5 MG/1
5-10 TABLET ORAL EVERY 4 HOURS PRN
Qty: 10 TABLET | Refills: 0 | Status: SHIPPED | OUTPATIENT
Start: 2022-10-28 | End: 2023-12-03

## 2022-10-28 RX ORDER — LIDOCAINE 40 MG/G
CREAM TOPICAL
Status: DISCONTINUED | OUTPATIENT
Start: 2022-10-28 | End: 2022-10-28 | Stop reason: HOSPADM

## 2022-10-28 RX ORDER — BUPIVACAINE HYDROCHLORIDE 2.5 MG/ML
INJECTION, SOLUTION INFILTRATION; PERINEURAL PRN
Status: DISCONTINUED | OUTPATIENT
Start: 2022-10-28 | End: 2022-10-28 | Stop reason: HOSPADM

## 2022-10-28 RX ORDER — SCOLOPAMINE TRANSDERMAL SYSTEM 1 MG/1
1 PATCH, EXTENDED RELEASE TRANSDERMAL ONCE
Status: DISCONTINUED | OUTPATIENT
Start: 2022-10-28 | End: 2022-10-28 | Stop reason: HOSPADM

## 2022-10-28 RX ORDER — MEPERIDINE HYDROCHLORIDE 25 MG/ML
12.5 INJECTION INTRAMUSCULAR; INTRAVENOUS; SUBCUTANEOUS
Status: DISCONTINUED | OUTPATIENT
Start: 2022-10-28 | End: 2022-10-28 | Stop reason: HOSPADM

## 2022-10-28 RX ADMIN — GLYCOPYRROLATE 0.2 MG: 0.2 INJECTION, SOLUTION INTRAMUSCULAR; INTRAVENOUS at 10:01

## 2022-10-28 RX ADMIN — ACETAMINOPHEN 975 MG: 325 TABLET, FILM COATED ORAL at 07:54

## 2022-10-28 RX ADMIN — PROPOFOL 200 MCG/KG/MIN: 10 INJECTION, EMULSION INTRAVENOUS at 09:47

## 2022-10-28 RX ADMIN — SUGAMMADEX 300 MG: 100 INJECTION, SOLUTION INTRAVENOUS at 10:18

## 2022-10-28 RX ADMIN — SODIUM CHLORIDE, POTASSIUM CHLORIDE, SODIUM LACTATE AND CALCIUM CHLORIDE: 600; 310; 30; 20 INJECTION, SOLUTION INTRAVENOUS at 11:09

## 2022-10-28 RX ADMIN — PHENYLEPHRINE HYDROCHLORIDE 50 MCG: 10 INJECTION INTRAVENOUS at 10:01

## 2022-10-28 RX ADMIN — LIDOCAINE HYDROCHLORIDE 5 ML: 10 INJECTION, SOLUTION INFILTRATION; PERINEURAL at 09:44

## 2022-10-28 RX ADMIN — OXYCODONE HYDROCHLORIDE 5 MG: 5 TABLET ORAL at 11:48

## 2022-10-28 RX ADMIN — ONDANSETRON 4 MG: 2 INJECTION INTRAMUSCULAR; INTRAVENOUS at 10:14

## 2022-10-28 RX ADMIN — DEXAMETHASONE SODIUM PHOSPHATE 10 MG: 10 INJECTION, SOLUTION INTRAMUSCULAR; INTRAVENOUS at 10:09

## 2022-10-28 RX ADMIN — ROCURONIUM BROMIDE 5 MG: 50 INJECTION, SOLUTION INTRAVENOUS at 09:47

## 2022-10-28 RX ADMIN — MIDAZOLAM 2 MG: 1 INJECTION INTRAMUSCULAR; INTRAVENOUS at 09:36

## 2022-10-28 RX ADMIN — PROPOFOL 200 MG: 10 INJECTION, EMULSION INTRAVENOUS at 09:44

## 2022-10-28 RX ADMIN — KETOROLAC TROMETHAMINE 15 MG: 30 INJECTION, SOLUTION INTRAMUSCULAR at 10:23

## 2022-10-28 RX ADMIN — GLYCOPYRROLATE 0.2 MG: 0.2 INJECTION, SOLUTION INTRAMUSCULAR; INTRAVENOUS at 09:52

## 2022-10-28 RX ADMIN — MAGNESIUM SULFATE HEPTAHYDRATE 4 G: 80 INJECTION, SOLUTION INTRAVENOUS at 07:58

## 2022-10-28 RX ADMIN — SUGAMMADEX 100 MG: 100 INJECTION, SOLUTION INTRAVENOUS at 10:32

## 2022-10-28 RX ADMIN — SODIUM CHLORIDE, POTASSIUM CHLORIDE, SODIUM LACTATE AND CALCIUM CHLORIDE: 600; 310; 30; 20 INJECTION, SOLUTION INTRAVENOUS at 08:03

## 2022-10-28 RX ADMIN — SCOPALAMINE 1 PATCH: 1 PATCH, EXTENDED RELEASE TRANSDERMAL at 07:55

## 2022-10-28 RX ADMIN — ROCURONIUM BROMIDE 30 MG: 50 INJECTION, SOLUTION INTRAVENOUS at 10:06

## 2022-10-28 RX ADMIN — FENTANYL CITRATE 100 MCG: 50 INJECTION, SOLUTION INTRAMUSCULAR; INTRAVENOUS at 09:36

## 2022-10-28 RX ADMIN — PHENYLEPHRINE HYDROCHLORIDE 50 MCG: 10 INJECTION INTRAVENOUS at 09:52

## 2022-10-28 ASSESSMENT — ACTIVITIES OF DAILY LIVING (ADL)
ADLS_ACUITY_SCORE: 18
ADLS_ACUITY_SCORE: 18
ADLS_ACUITY_SCORE: 33

## 2022-10-28 NOTE — ANESTHESIA POSTPROCEDURE EVALUATION
Patient: Gabriela Eason    Procedure: Procedure(s):  LAPAROSCOPIC BILATERAL SALPINGECTOMY       Anesthesia Type:  General    Note:  Disposition: Outpatient   Postop Pain Control: Uneventful            Sign Out: Well controlled pain   PONV: No   Neuro/Psych: Uneventful            Sign Out: Acceptable/Baseline neuro status   Airway/Respiratory: Uneventful            Sign Out: Acceptable/Baseline resp. status   CV/Hemodynamics: Uneventful            Sign Out: Acceptable CV status; No obvious hypovolemia; No obvious fluid overload   Other NRE: NONE   DID A NON-ROUTINE EVENT OCCUR? No           Last vitals:  Vitals Value Taken Time   /61 10/28/22 1130   Temp 36.2  C (97.2  F) 10/28/22 1115   Pulse 80 10/28/22 1133   Resp 13 10/28/22 1133   SpO2 100 % 10/28/22 1133   Vitals shown include unvalidated device data.    Electronically Signed By: Rafi Angel MD  October 28, 2022  5:23 PM

## 2022-10-28 NOTE — OP NOTE
PROCEDURE NOTE: Bilateral salpingectomy    NAME: Gabriela Eason   : 1995   MRN: # [id number]     DATE OF SURGERY: 10/28/2022     PREOPERATIVE DIAGNOSIS: undesired future fertility     POSTOPERATIVE DIAGNOSIS: undesired future fertility    SURGERY PERFORMED:  Laparoscopic tubal sterilization via bilateral salpingectomy    SURGEON: Michael Ortiz MD     ASSISTANT: OR STAFF    OPERATIVE FINDINGS:  Normal appearance to the tubes, uterus and ovaries    ESTIMATED BLOOD LOSS: * No blood loss amount entered *    SPECIMENS: none    COMPLICATIONS: none    CONSENT: Patient was met preoperatively, where we took time discussing the procedure and the risks associated with this procedure.  We also discussed the failure rate of laparoscopic tubal sterilization with bilateral salpingectomy specifically and increased risk of ectopic pregnancy. Her questions and concerns were answered.  She signed her consent and was brought to the operating room in stable condition.    PROCEDURE:  After the induction of general anesthetic, she was carefully prepped and draped in sterile fashion for the procedure and a timeout was performed.     Her bladder was drained.  A sterile bivalve speculum was placed in the vagina.  A single-toothed tenaculum was used to grasp the anterior slip of the cervix and a uterine manipulator was placed into the uterus.    Attention was then turned to the abdomen.  An incision was made in the umbilicus.  A Veress needle was introduced with a two-pop technique.  An opening pressure of 3-4 was noted, and saline drop test confirmed adequate placement.  Insufflation was done until 15 mm of pressure was established.  A 5 mm non-bladed trocar was then placed in this incision followed by the camera.  Good entry into the abdomen was noted.  Under direct visual guidance a 7-8 non-bladed trocar was placed approximately 4 cm above the symphysis pubis under direct visual guidance.  At this junction, the tube was  followed out to the fimbriated end.  A Ligasure was used across thr mesosalpinx and the entire tube was removed. This was then carried out on the contralateral side as well.   Good hemostasis was noted. The lower port was then removed under direct visualization.  30 cc of Marcaine was instilled in the abdomen.  The upper port was then removed, as well.  Gas was then drained from the abdomen. Skin was closed with Nylon suture.  Steri-Strips were applied.  Patient tolerated this procedure well. Sponge, lap and needle counts were correct times two.        Michael Ortiz MD     CC:  Clinic, [facility], Michael Ortiz MD

## 2022-10-28 NOTE — H&P
History and Physical Update    I have examined the patient and reviewed the history and physical that is present on this chart. The changes in the patient's history and physical condition are as follows:    None    Michael Ortiz MD

## 2022-10-28 NOTE — ANESTHESIA CARE TRANSFER NOTE
Patient: Gabriela Eason    Procedure: Procedure(s):  LAPAROSCOPIC BILATERAL SALPINGECTOMY       Diagnosis: Sterilization [Z30.2]  Diagnosis Additional Information: No value filed.    Anesthesia Type:   General     Note:    Oropharynx: oropharynx clear of all foreign objects and spontaneously breathing  Level of Consciousness: drowsy  Oxygen Supplementation: face mask  Level of Supplemental Oxygen (L/min / FiO2): 6  Independent Airway: airway patency satisfactory and stable  Dentition: dentition unchanged  Vital Signs Stable: post-procedure vital signs reviewed and stable  Report to RN Given: handoff report given  Patient transferred to: PACU    Handoff Report: Identifed the Patient, Identified the Reponsible Provider, Reviewed the pertinent medical history, Discussed the surgical course, Reviewed Intra-OP anesthesia mangement and issues during anesthesia, Set expectations for post-procedure period and Allowed opportunity for questions and acknowledgement of understanding      Vitals:  Vitals Value Taken Time   /65 10/28/22 1038   Temp     Pulse 91 10/28/22 1041   Resp 19 10/28/22 1041   SpO2 100 % 10/28/22 1041   Vitals shown include unvalidated device data.    Electronically Signed By: JUAN RAMON Moser CRNA  October 28, 2022  10:43 AM

## 2022-10-28 NOTE — ANESTHESIA PROCEDURE NOTES
Airway       Patient location during procedure: OR       Procedure Start/Stop Times: 10/28/2022 9:47 AM  Staff -        CRNA: Opal Quintanilla APRN CRNA       Performed By: CRNA  Consent for Airway        Urgency: elective  Indications and Patient Condition       Indications for airway management: felipa-procedural         Mask difficulty assessment: 1 - vent by mask    Final Airway Details       Final airway type: endotracheal airway       Successful airway: ETT - single  Endotracheal Airway Details        ETT size (mm): 7.0       Cuffed: yes       Successful intubation technique: direct laryngoscopy       Adjucts: stylet       Position: Center       Measured from: gums/teeth       Secured at (cm): 22    Post intubation assessment        Placement verified by: capnometry, equal breath sounds and chest rise        Number of attempts at approach: 1       Number of other approaches attempted: 0       Secured with: silk tape       Ease of procedure: easy       Dentition: Intact       Dental guard used and removed.    Medication(s) Administered   Medication Administration Time: 10/28/2022 9:47 AM

## 2022-10-28 NOTE — ANESTHESIA PREPROCEDURE EVALUATION
Anesthesia Pre-Procedure Evaluation    Patient: Gabriela Eason   MRN: [id number] : 1995        Procedure : Procedure(s):  LAPAROSCOPIC BILATERAL SALPINGECTOMY          Past Medical History:   Diagnosis Date     Acute respiratory failure (H) 2017     Anxiety      Depressive disorder      PONV (postoperative nausea and vomiting)      Posttraumatic stress disorder      Status post thoracostomy tube placement (H) 2017      Past Surgical History:   Procedure Laterality Date      SECTION N/A 2022    Procedure: PRIMARY  SECTION;  Surgeon: Michael Ortiz MD;  Location: [facility]     THORACOSCOPY Right 2017    Procedure: RIGHT VIDEO ASSISTED THORACOSCOPY WITH MECHANICAL PLEURODESIS AND APICAL PLEURECTOMY THROUGH A SUBXYPHIOD APPROACH;  Surgeon: Ricky Hernandez MD;  Location: [facility];  Service:      TONSILLECTOMY Bilateral      ZZC THORACOSCOPY,DX NO BX Left 2020    Procedure: THORACOSCOPY, LEFT LUNG WEDGE RESECTION, PLEURODESIS, CHEST TUBE PLACEMENT;  Surgeon: Dax Arias MD;  Location: [facility];  Service: General      Allergies   Allergen Reactions     Adhesive Tape Rash      Social History     Tobacco Use     Smoking status: Never     Smokeless tobacco: Never   Substance Use Topics     Alcohol use: No      Wt Readings from Last 1 Encounters:   10/28/22 59.2 kg (130 lb 9.6 oz)        Anesthesia Evaluation   Pt has had prior anesthetic. Type: General.    History of anesthetic complications  - PONV.      ROS/MED HX  ENT/Pulmonary: Comment: Hx of spontaneous ptx  s/p talc pleurodesis w/out recurrence      Neurologic:  - neg neurologic ROS     Cardiovascular:  - neg cardiovascular ROS     METS/Exercise Tolerance:     Hematologic:  - neg hematologic  ROS     Musculoskeletal:  - neg musculoskeletal ROS     GI/Hepatic:    (-) GERD   Renal/Genitourinary:  - neg Renal ROS     Endo:  - neg endo ROS     Psychiatric/Substance Use:   - neg psychiatric ROS     Infectious Disease:  - neg infectious disease ROS     Malignancy:  - neg malignancy ROS     Other: Comment: S/f laparoscopic bilateral salpingectomy    (-) Any chance pregnantOther Significant Disability:         Physical Exam    Airway        Mallampati: I   TM distance: > 3 FB   Neck ROM: full   Mouth opening: > 3 cm    Respiratory Devices and Support         Dental  no notable dental history         Cardiovascular   cardiovascular exam normal          Pulmonary   pulmonary exam normal                OUTSIDE LABS:  CBC:   Lab Results   Component Value Date    WBC 9.7 05/28/2022    WBC 5.4 03/11/2022    HGB 12.6 10/28/2022    HGB 10.5 (L) 09/09/2022    HCT 38.3 05/28/2022    HCT 38.0 03/11/2022     05/28/2022     03/11/2022     BMP:   Lab Results   Component Value Date     (L) 05/28/2022     03/11/2022    POTASSIUM 3.8 05/28/2022    POTASSIUM 4.0 03/11/2022    CHLORIDE 103 05/28/2022    CHLORIDE 105 03/11/2022    CO2 19 (L) 05/28/2022    CO2 24 03/11/2022    BUN 6 (L) 05/28/2022    BUN 7 (L) 03/11/2022    CR 0.56 (L) 05/28/2022    CR 0.64 03/11/2022    GLC 89 05/28/2022     03/11/2022     COAGS: No results found for: PTT, INR, FIBR  POC:   Lab Results   Component Value Date    HCG Negative 10/28/2022     HEPATIC:   Lab Results   Component Value Date    ALBUMIN 3.3 (L) 05/28/2022    PROTTOTAL 7.1 05/28/2022    ALT <9 05/28/2022    AST 15 05/28/2022    ALKPHOS 72 05/28/2022    BILITOTAL 0.3 05/28/2022     OTHER:   Lab Results   Component Value Date    PH 7.40 03/06/2022    JENNY 8.9 05/28/2022    MAG 1.7 (L) 03/11/2022    LIPASE 27 05/28/2022       Anesthesia Plan    ASA Status:  2   NPO Status:  NPO Appropriate    Anesthesia Type: General.     - Airway: ETT   Induction: Intravenous, Propofol.   Maintenance: TIVA.        Consents    Anesthesia Plan(s) and associated risks, benefits, and realistic alternatives discussed. Questions answered and  patient/representative(s) expressed understanding.    - Discussed:     - Discussed with:  Patient         Postoperative Care    Pain management: IV analgesics, Multi-modal analgesia.   PONV prophylaxis: Ondansetron (or other 5HT-3), Scopolamine patch, Background Propofol Infusion, Dexamethasone or Solumedrol     Comments:    Other Comments: GETA  Toradol 15mg at case close  Scop patch, decadron 10, zofran 4, propofol gtt            Era Delacruz MD

## 2022-10-31 LAB
PATH REPORT.COMMENTS IMP SPEC: NORMAL
PATH REPORT.COMMENTS IMP SPEC: NORMAL
PATH REPORT.FINAL DX SPEC: NORMAL
PATH REPORT.GROSS SPEC: NORMAL
PATH REPORT.MICROSCOPIC SPEC OTHER STN: NORMAL
PATH REPORT.RELEVANT HX SPEC: NORMAL
PHOTO IMAGE: NORMAL

## 2022-10-31 PROCEDURE — 88302 TISSUE EXAM BY PATHOLOGIST: CPT | Mod: 26 | Performed by: PATHOLOGY

## 2023-05-15 ENCOUNTER — LAB REQUISITION (OUTPATIENT)
Dept: LAB | Facility: CLINIC | Age: 28
End: 2023-05-15
Payer: COMMERCIAL

## 2023-05-15 DIAGNOSIS — N92.0 EXCESSIVE AND FREQUENT MENSTRUATION WITH REGULAR CYCLE: ICD-10-CM

## 2023-05-15 PROCEDURE — 88305 TISSUE EXAM BY PATHOLOGIST: CPT | Mod: TC,ORL | Performed by: OBSTETRICS & GYNECOLOGY

## 2023-05-15 PROCEDURE — 88305 TISSUE EXAM BY PATHOLOGIST: CPT | Mod: 26 | Performed by: STUDENT IN AN ORGANIZED HEALTH CARE EDUCATION/TRAINING PROGRAM

## 2023-06-04 ENCOUNTER — HEALTH MAINTENANCE LETTER (OUTPATIENT)
Age: 28
End: 2023-06-04

## 2023-09-12 ENCOUNTER — LAB REQUISITION (OUTPATIENT)
Dept: LAB | Facility: CLINIC | Age: 28
End: 2023-09-12
Payer: COMMERCIAL

## 2023-09-12 DIAGNOSIS — Z87.448 PERSONAL HISTORY OF OTHER DISEASES OF URINARY SYSTEM: ICD-10-CM

## 2023-09-12 PROCEDURE — 81001 URINALYSIS AUTO W/SCOPE: CPT | Mod: ORL | Performed by: NURSE PRACTITIONER

## 2023-09-13 LAB
ALBUMIN UR-MCNC: NEGATIVE MG/DL
APPEARANCE UR: CLEAR
BILIRUB UR QL STRIP: NEGATIVE
COLOR UR AUTO: ABNORMAL
GLUCOSE UR STRIP-MCNC: NEGATIVE MG/DL
HGB UR QL STRIP: NEGATIVE
KETONES UR STRIP-MCNC: NEGATIVE MG/DL
LEUKOCYTE ESTERASE UR QL STRIP: NEGATIVE
MUCOUS THREADS #/AREA URNS LPF: PRESENT /LPF
NITRATE UR QL: NEGATIVE
PH UR STRIP: 6 [PH] (ref 5–7)
RBC URINE: <1 /HPF
SP GR UR STRIP: 1.02 (ref 1–1.03)
SQUAMOUS EPITHELIAL: 1 /HPF
UROBILINOGEN UR STRIP-MCNC: NORMAL MG/DL
WBC URINE: 1 /HPF

## 2023-12-02 ENCOUNTER — OFFICE VISIT (OUTPATIENT)
Dept: URGENT CARE | Facility: URGENT CARE | Age: 28
End: 2023-12-02
Payer: COMMERCIAL

## 2023-12-02 VITALS
OXYGEN SATURATION: 98 % | TEMPERATURE: 98.9 F | HEART RATE: 92 BPM | RESPIRATION RATE: 12 BRPM | SYSTOLIC BLOOD PRESSURE: 95 MMHG | DIASTOLIC BLOOD PRESSURE: 68 MMHG

## 2023-12-02 DIAGNOSIS — A08.4 VIRAL ENTERITIS: Primary | ICD-10-CM

## 2023-12-02 DIAGNOSIS — R39.15 URINARY URGENCY: ICD-10-CM

## 2023-12-02 LAB
ALBUMIN UR-MCNC: 100 MG/DL
APPEARANCE UR: CLEAR
BILIRUB UR QL STRIP: ABNORMAL
COLOR UR AUTO: YELLOW
GLUCOSE UR STRIP-MCNC: NEGATIVE MG/DL
GRAN CASTS #/AREA URNS LPF: ABNORMAL /LPF
HGB UR QL STRIP: ABNORMAL
KETONES UR STRIP-MCNC: NEGATIVE MG/DL
LEUKOCYTE ESTERASE UR QL STRIP: NEGATIVE
MUCOUS THREADS #/AREA URNS LPF: PRESENT /LPF
NITRATE UR QL: NEGATIVE
PH UR STRIP: 5.5 [PH] (ref 5–7)
RBC #/AREA URNS AUTO: ABNORMAL /HPF
SP GR UR STRIP: 1.02 (ref 1–1.03)
UROBILINOGEN UR STRIP-ACNC: 2 E.U./DL
WBC #/AREA URNS AUTO: ABNORMAL /HPF

## 2023-12-02 PROCEDURE — 81001 URINALYSIS AUTO W/SCOPE: CPT | Performed by: INTERNAL MEDICINE

## 2023-12-02 PROCEDURE — 99202 OFFICE O/P NEW SF 15 MIN: CPT | Performed by: INTERNAL MEDICINE

## 2023-12-02 NOTE — LETTER
December 2, 2023      Gabriela Eason  [address]  [facility] [facility] [address]        To Whom It May Concern:    I have seen Gabriela MARCIA Eason in the Urgent Care on 12/2/2023 for an acute medical illness.  Please excuse absences from work on 12/2/2023 and 12/3/2023.  Thank you.        Sincerely,        Paul Saucedo MD

## 2023-12-03 ENCOUNTER — APPOINTMENT (OUTPATIENT)
Dept: RADIOLOGY | Facility: HOSPITAL | Age: 28
End: 2023-12-03
Attending: INTERNAL MEDICINE
Payer: COMMERCIAL

## 2023-12-03 ENCOUNTER — APPOINTMENT (OUTPATIENT)
Dept: RADIOLOGY | Facility: HOSPITAL | Age: 28
End: 2023-12-03
Attending: EMERGENCY MEDICINE
Payer: COMMERCIAL

## 2023-12-03 ENCOUNTER — HOSPITAL ENCOUNTER (INPATIENT)
Facility: HOSPITAL | Age: 28
LOS: 3 days | Discharge: HOME | End: 2023-12-07
Attending: EMERGENCY MEDICINE | Admitting: FAMILY MEDICINE
Payer: COMMERCIAL

## 2023-12-03 DIAGNOSIS — J93.11 PRIMARY SPONTANEOUS PNEUMOTHORAX: ICD-10-CM

## 2023-12-03 DIAGNOSIS — J93.9 PNEUMOTHORAX, UNSPECIFIED TYPE: Primary | ICD-10-CM

## 2023-12-03 LAB
ANION GAP SERPL CALCULATED.3IONS-SCNC: 11 MMOL/L (ref 7–15)
BASOPHILS # BLD AUTO: 0 10E3/UL (ref 0–0.2)
BASOPHILS NFR BLD AUTO: 0 %
BUN SERPL-MCNC: 10.9 MG/DL (ref 6–20)
CALCIUM SERPL-MCNC: 8.8 MG/DL (ref 8.6–10)
CHLORIDE SERPL-SCNC: 104 MMOL/L (ref 98–107)
CREAT SERPL-MCNC: 0.68 MG/DL (ref 0.51–0.95)
DEPRECATED HCO3 PLAS-SCNC: 23 MMOL/L (ref 22–29)
EGFRCR SERPLBLD CKD-EPI 2021: >90 ML/MIN/1.73M2
EOSINOPHIL # BLD AUTO: 0.1 10E3/UL (ref 0–0.7)
EOSINOPHIL NFR BLD AUTO: 1 %
ERYTHROCYTE [DISTWIDTH] IN BLOOD BY AUTOMATED COUNT: 11.9 % (ref 10–15)
GLUCOSE SERPL-MCNC: 86 MG/DL (ref 70–99)
HCT VFR BLD AUTO: 39.9 % (ref 35–47)
HGB BLD-MCNC: 13.5 G/DL (ref 11.7–15.7)
IMM GRANULOCYTES # BLD: 0 10E3/UL
IMM GRANULOCYTES NFR BLD: 0 %
LYMPHOCYTES # BLD AUTO: 1.7 10E3/UL (ref 0.8–5.3)
LYMPHOCYTES NFR BLD AUTO: 34 %
MCH RBC QN AUTO: 30 PG (ref 26.5–33)
MCHC RBC AUTO-ENTMCNC: 33.8 G/DL (ref 31.5–36.5)
MCV RBC AUTO: 89 FL (ref 78–100)
MONOCYTES # BLD AUTO: 0.4 10E3/UL (ref 0–1.3)
MONOCYTES NFR BLD AUTO: 8 %
NEUTROPHILS # BLD AUTO: 2.7 10E3/UL (ref 1.6–8.3)
NEUTROPHILS NFR BLD AUTO: 57 %
NRBC # BLD AUTO: 0 10E3/UL
NRBC BLD AUTO-RTO: 0 /100
PLATELET # BLD AUTO: 170 10E3/UL (ref 150–450)
POTASSIUM SERPL-SCNC: 3.8 MMOL/L (ref 3.4–5.3)
RBC # BLD AUTO: 4.5 10E6/UL (ref 3.8–5.2)
SODIUM SERPL-SCNC: 138 MMOL/L (ref 135–145)
WBC # BLD AUTO: 4.9 10E3/UL (ref 4–11)

## 2023-12-03 PROCEDURE — 96375 TX/PRO/DX INJ NEW DRUG ADDON: CPT

## 2023-12-03 PROCEDURE — G0378 HOSPITAL OBSERVATION PER HR: HCPCS

## 2023-12-03 PROCEDURE — 99222 1ST HOSP IP/OBS MODERATE 55: CPT | Performed by: INTERNAL MEDICINE

## 2023-12-03 PROCEDURE — 99223 1ST HOSP IP/OBS HIGH 75: CPT | Performed by: INTERNAL MEDICINE

## 2023-12-03 PROCEDURE — 36415 COLL VENOUS BLD VENIPUNCTURE: CPT | Performed by: INTERNAL MEDICINE

## 2023-12-03 PROCEDURE — 93005 ELECTROCARDIOGRAM TRACING: CPT | Performed by: EMERGENCY MEDICINE

## 2023-12-03 PROCEDURE — 99285 EMERGENCY DEPT VISIT HI MDM: CPT | Mod: 25

## 2023-12-03 PROCEDURE — 85025 COMPLETE CBC W/AUTO DIFF WBC: CPT | Performed by: INTERNAL MEDICINE

## 2023-12-03 PROCEDURE — 250N000011 HC RX IP 250 OP 636: Mod: JZ | Performed by: INTERNAL MEDICINE

## 2023-12-03 PROCEDURE — 71045 X-RAY EXAM CHEST 1 VIEW: CPT

## 2023-12-03 PROCEDURE — 96374 THER/PROPH/DIAG INJ IV PUSH: CPT

## 2023-12-03 PROCEDURE — 96376 TX/PRO/DX INJ SAME DRUG ADON: CPT

## 2023-12-03 PROCEDURE — 80048 BASIC METABOLIC PNL TOTAL CA: CPT | Performed by: INTERNAL MEDICINE

## 2023-12-03 PROCEDURE — 250N000011 HC RX IP 250 OP 636: Performed by: EMERGENCY MEDICINE

## 2023-12-03 PROCEDURE — 71046 X-RAY EXAM CHEST 2 VIEWS: CPT

## 2023-12-03 RX ORDER — ONDANSETRON 4 MG/1
4 TABLET, ORALLY DISINTEGRATING ORAL EVERY 6 HOURS PRN
Status: DISCONTINUED | OUTPATIENT
Start: 2023-12-03 | End: 2023-12-07 | Stop reason: HOSPADM

## 2023-12-03 RX ORDER — HYDROMORPHONE HCL IN WATER/PF 6 MG/30 ML
0.4 PATIENT CONTROLLED ANALGESIA SYRINGE INTRAVENOUS
Status: DISCONTINUED | OUTPATIENT
Start: 2023-12-03 | End: 2023-12-05

## 2023-12-03 RX ORDER — ACETAMINOPHEN 650 MG/1
650 SUPPOSITORY RECTAL EVERY 4 HOURS PRN
Status: DISCONTINUED | OUTPATIENT
Start: 2023-12-03 | End: 2023-12-04

## 2023-12-03 RX ORDER — ACETAMINOPHEN 325 MG/1
650 TABLET ORAL EVERY 4 HOURS PRN
Status: DISCONTINUED | OUTPATIENT
Start: 2023-12-03 | End: 2023-12-04

## 2023-12-03 RX ORDER — CALCIUM CARBONATE 500 MG/1
1000 TABLET, CHEWABLE ORAL 4 TIMES DAILY PRN
Status: DISCONTINUED | OUTPATIENT
Start: 2023-12-03 | End: 2023-12-07 | Stop reason: HOSPADM

## 2023-12-03 RX ORDER — NALOXONE HYDROCHLORIDE 0.4 MG/ML
0.4 INJECTION, SOLUTION INTRAMUSCULAR; INTRAVENOUS; SUBCUTANEOUS
Status: DISCONTINUED | OUTPATIENT
Start: 2023-12-03 | End: 2023-12-07 | Stop reason: HOSPADM

## 2023-12-03 RX ORDER — HYDROMORPHONE HCL IN WATER/PF 6 MG/30 ML
0.2 PATIENT CONTROLLED ANALGESIA SYRINGE INTRAVENOUS
Status: DISCONTINUED | OUTPATIENT
Start: 2023-12-03 | End: 2023-12-05

## 2023-12-03 RX ORDER — AMOXICILLIN 250 MG
2 CAPSULE ORAL 2 TIMES DAILY PRN
Status: DISCONTINUED | OUTPATIENT
Start: 2023-12-03 | End: 2023-12-07 | Stop reason: HOSPADM

## 2023-12-03 RX ORDER — NALOXONE HYDROCHLORIDE 0.4 MG/ML
0.2 INJECTION, SOLUTION INTRAMUSCULAR; INTRAVENOUS; SUBCUTANEOUS
Status: DISCONTINUED | OUTPATIENT
Start: 2023-12-03 | End: 2023-12-07 | Stop reason: HOSPADM

## 2023-12-03 RX ORDER — HYDROMORPHONE HYDROCHLORIDE 2 MG/1
2 TABLET ORAL EVERY 4 HOURS PRN
Status: DISCONTINUED | OUTPATIENT
Start: 2023-12-03 | End: 2023-12-05

## 2023-12-03 RX ORDER — AMOXICILLIN 250 MG
1 CAPSULE ORAL 2 TIMES DAILY PRN
Status: DISCONTINUED | OUTPATIENT
Start: 2023-12-03 | End: 2023-12-07 | Stop reason: HOSPADM

## 2023-12-03 RX ORDER — MORPHINE SULFATE 4 MG/ML
4 INJECTION, SOLUTION INTRAMUSCULAR; INTRAVENOUS ONCE
Status: COMPLETED | OUTPATIENT
Start: 2023-12-03 | End: 2023-12-03

## 2023-12-03 RX ORDER — PANTOPRAZOLE SODIUM 40 MG/1
40 TABLET, DELAYED RELEASE ORAL
Status: DISCONTINUED | OUTPATIENT
Start: 2023-12-03 | End: 2023-12-07 | Stop reason: HOSPADM

## 2023-12-03 RX ORDER — LIDOCAINE 40 MG/G
CREAM TOPICAL
Status: DISCONTINUED | OUTPATIENT
Start: 2023-12-03 | End: 2023-12-07 | Stop reason: HOSPADM

## 2023-12-03 RX ORDER — ONDANSETRON 2 MG/ML
4 INJECTION INTRAMUSCULAR; INTRAVENOUS EVERY 6 HOURS PRN
Status: DISCONTINUED | OUTPATIENT
Start: 2023-12-03 | End: 2023-12-07 | Stop reason: HOSPADM

## 2023-12-03 RX ADMIN — MORPHINE SULFATE 4 MG: 4 INJECTION, SOLUTION INTRAMUSCULAR; INTRAVENOUS at 16:16

## 2023-12-03 RX ADMIN — HYDROMORPHONE HYDROCHLORIDE 0.2 MG: 0.2 INJECTION, SOLUTION INTRAMUSCULAR; INTRAVENOUS; SUBCUTANEOUS at 18:24

## 2023-12-03 RX ADMIN — HYDROMORPHONE HYDROCHLORIDE 0.2 MG: 0.2 INJECTION, SOLUTION INTRAMUSCULAR; INTRAVENOUS; SUBCUTANEOUS at 21:26

## 2023-12-03 ASSESSMENT — ACTIVITIES OF DAILY LIVING (ADL)
ADLS_ACUITY_SCORE: 35

## 2023-12-03 NOTE — PHARMACY-ADMISSION MEDICATION HISTORY
Pharmacist Admission Medication History    Admission medication history is complete. The information provided in this note is only as accurate as the sources available at the time of the update.    Information Source(s): Patient via in-person    Pertinent Information: Patient takes no medications PTA    Changes made to PTA medication list:  Added: None  Deleted: Acetaminophen, Famotidine, Ibuprofen, Oxycodone, Prenatal VItamin  Changed: None    Medication Affordability:       Allergies reviewed with patient and updates made in EHR: yes    Medication History Completed By: CARL GIBSON RPH 12/3/2023 4:37 PM    No outpatient medications have been marked as taking for the 12/3/23 encounter (Hospital Encounter).

## 2023-12-03 NOTE — ED TRIAGE NOTES
Pt presents with sudden onset of left anterior chest, left upper and mid back, left ribcage pain, shortness of breath and shallow breathing. Pt has hx of spontaneous pneumothorax X2.      Triage Assessment (Adult)       Row Name 12/03/23 1525          Triage Assessment    Airway WDL WDL        Respiratory WDL    Respiratory WDL X;rhythm/pattern     Rhythm/Pattern, Respiratory shortness of breath;shallow        Skin Circulation/Temperature WDL    Skin Circulation/Temperature WDL WDL        Cardiac WDL    Cardiac WDL X;chest pain        Chest Pain Assessment    Chest Pain Location anterior chest, left     Chest Pain Radiation back     Character stabbing        Peripheral/Neurovascular WDL    Peripheral Neurovascular WDL WDL        Cognitive/Neuro/Behavioral WDL    Cognitive/Neuro/Behavioral WDL WDL

## 2023-12-03 NOTE — PROGRESS NOTES
Assessment & Plan     Viral enteritis  Supportive care, reassurance, BRAT diet.    Urinary urgency  - UA Macroscopic with reflex to Microscopic and Culture - Clinic Collect  - UA Microscopic with Reflex to Culture    Paul Saucedo MD  [facility]    Fredrick Ochoa is a 28 year old, presenting for the following health issues:  Diarrhea (Onset of symptoms started last night, Requesting work note), Vomiting, Chills, Generalized Body Aches, and Fever (Temp 99.8 last night,=)      HPI   Chief complaint of flu-like symptoms.  Started with chills and temp to 99.8 last night.  Myalgias.  Diarrhea started last night (very frequent) -- now reduced.  Threw up x 1 but not today.  Boyfriend with similar illness.  Denies sore throat, congestion, cough.      Review of Systems   Constitutional, HEENT, cardiovascular, pulmonary, gi and gu systems are negative, except as otherwise noted.      Objective    BP 95/68   Pulse 92   Temp 98.9  F (37.2  C) (Oral)   Resp 12   SpO2 98%   There is no height or weight on file to calculate BMI.  Physical Exam   GENERAL APPEARANCE: healthy, alert, and no distress  HENT: ear canals and TM's normal and nose and mouth without ulcers or lesions  RESP: lungs clear to auscultation - no rales, rhonchi or wheezes  CV: regular rates and rhythm, normal S1 S2, no S3 or S4, and no murmur, click or rub  ABDOMEN: soft, nontender, without hepatosplenomegaly or masses and bowel sounds normal    Results for orders placed or performed in visit on 12/02/23 (from the past 24 hour(s))   UA Macroscopic with reflex to Microscopic and Culture - Clinic Collect    Specimen: Urine, Midstream   Result Value Ref Range    Color Urine Yellow Colorless, Straw, Light Yellow, Yellow    Appearance Urine Clear Clear    Glucose Urine Negative Negative mg/dL    Bilirubin Urine Small (A) Negative    Ketones Urine Negative Negative mg/dL    Specific Gravity Urine 1.025 1.003 - 1.035     Blood Urine Small (A) Negative    pH Urine 5.5 5.0 - 7.0    Protein Albumin Urine 100 (A) Negative mg/dL    Urobilinogen Urine 2.0 (A) 0.2, 1.0 E.U./dL    Nitrite Urine Negative Negative    Leukocyte Esterase Urine Negative Negative   UA Microscopic with Reflex to Culture   Result Value Ref Range    RBC Urine None Seen 0-2 /HPF /HPF    WBC Urine 0-5 0-5 /HPF /HPF    Mucus Urine Present (A) None Seen /LPF    Granular Casts Urine 0-2 (A) None Seen /LPF    Narrative    Urine Culture not indicated

## 2023-12-03 NOTE — LETTER
To Whom it may concern:    Gabriela Eason was hospitalized 12/3/2023-12/7/2023.  Able to return to work without restrictions.      Sincerely,      Valeria Norton MD

## 2023-12-03 NOTE — LETTER
December 7, 2023      To Whom It May Concern:      Gabriela Eason was hospitalized 12/3/2023-12/7/2023.  She may return to work/school  12/11/2023 with the following restrictions in place until 1/1/2024:  No lifting or carrying > 50 lbs.     Sincerely,            Talita Ng PA-C

## 2023-12-03 NOTE — CONSULTS
PULMONARY / CRITICAL CARE CONSULT NOTE    Date / Time of Admission:  12/3/2023  3:28 PM    Assessment:     Gabriela Eason is a 28 year old female with history of bilateral pneumothoraces. She initially had her pneumothorax back in 2017.  Evaluated by Dr. Hernandez, underwent right VATS with mechanical pleurodesis and apical pleurectomy through subxiphoid approach. Left side pneumothorax on March 2020, patient was seen by Dr. Arias and underwent thoracoscopy, left lung wedge resection and pleurodesis. Patient was seen in ED on January and March 2022 diagnosed with small right side pneumothorax, discharged home after observation.   Patient notes that she quit smoking back in January 2022.   Denies family history of pneumothoraces. Denies any relation of her pneumothorax to her menstrual cycle.  Presents to ED on 12/3 complaining of left side chest pain and shortness of breath   Patient was hemodynamically stable, tachypneic, afebrile, adequate oxygenation.   CXR showed small left side pneumothorax. Pulmonary service consulted.     Small left side pneumothorax    Advance Directives:  Full code    Plan:   O2 supplementation 2-3 LPM nasal cannula  Follow up CXR in 3 hours , If stable pneumothorax, get a follow up CXR in AM   Pain meds as needed  Will ask surgery for evaluation   Advance diet as tolerated  Add PPI  DVT prophylaxis SCDs    Please contact me if you have any questions.    Jose Gann  Pulmonary / Critical Care  12/03/2023  5:20 PM          Reason for consult : left pneumothorax   HPI:  Gabriela Eason is a 28 year old female with history of bilateral pneumothoraces. She initially had her pneumothorax back in 2017.  At that time she was evaluated by Dr. Hernandez.  She underwent right VATS with mechanical pleurodesis and apical pleurectomy through subxiphoid approach. She also had another pneumothorax, this time on the left side, back in 2020.  Patient was seen by Dr. Arias and underwent  thoracoscopy, left lung wedge resection and pleurodesis.   Patient notes that she quit smoking back in January 2022.    Denies family history of pneumothoraces. Denies any relation of her pneumothorax to her menstrual cycle.  Presents to ED on 12/3 complaining of left side chest pain and shortness of breath   Patient was hemodynamically stable, tachypneic, afebrile, adequate oxygenation.   CXR showed small left side pneumothorax. Pulmonary service consulted.     Past Medical History:   Diagnosis Date    Acute respiratory failure (H) 03/24/2017    Anxiety     Depressive disorder     PONV (postoperative nausea and vomiting)     Posttraumatic stress disorder     Status post thoracostomy tube placement (H) 03/24/2017       Allergies: Adhesive tape     MEDS:  No current facility-administered medications on file prior to encounter.  No current outpatient medications on file prior to encounter.    Social History     Socioeconomic History    Marital status: Single     Spouse name: Not on file    Number of children: Not on file    Years of education: Not on file    Highest education level: Not on file   Occupational History    Not on file   Tobacco Use    Smoking status: Never    Smokeless tobacco: Never   Substance and Sexual Activity    Alcohol use: No    Drug use: No    Sexual activity: Yes     Birth control/protection: Injection   Other Topics Concern    Not on file   Social History Narrative    ** Merged History Encounter **          Social Determinants of Health     Financial Resource Strain: Not on file   Food Insecurity: Not on file   Transportation Needs: Not on file   Physical Activity: Not on file   Stress: Not on file   Social Connections: Not on file   Interpersonal Safety: Not on file   Housing Stability: Not on file     No family history on file.    ROS  - Twelve point review of systems were discussed with patient, positive finding in HPI    Objective:   VITALS:  /67   Pulse 74   Temp 98.8  F (37.1  C)  "(Temporal)   Resp 25   Ht 1.715 m (5' 7.5\")   Wt 52 kg (114 lb 9.6 oz)   SpO2 100%   BMI 17.68 kg/m    VENT:  Resp: 25    EXAM:   Gen: awake, alert, no distress  HEENT: pink conjunctiva, moist mucosa, Mallampati II/IV  Neck: no thyromegaly, masses or JVD  Lungs: clear  CV: regular, no murmurs or gallops appreciated  Abdomen: soft, NT, BS wnl  Ext: no edema  Neuro: CN II-XII intact, non focal       Data Review:  Recent Labs   Lab 12/03/23  1635   GLC 86        12/03/23 16:35   Sodium 138   Potassium 3.8   Chloride 104   Carbon Dioxide (CO2) 23   Urea Nitrogen 10.9   Creatinine 0.68   GFR Estimate >90   Calcium 8.8   Anion Gap 11   Glucose 86   WBC 4.9   Hemoglobin 13.5   Hematocrit 39.9   Platelet Count 170   RBC Count 4.50   MCV 89   MCH 30.0   MCHC 33.8   RDW 11.9   % Neutrophils 57   % Lymphocytes 34   % Monocytes 8   % Eosinophils 1   % Basophils 0     XR CHEST 2 VIEWS  LOCATION: [facility]  DATE: 12/3/2023  INDICATION: possible pneumothorax  COMPARISON: 10/13/2022  IMPRESSION: Small left pneumothorax measures 1 cm at the apex. The lungs are clear.    By:  Jose Gann MD, 12/03/2023  5:20 PM    Primary Care Physician:  [facility]             "

## 2023-12-03 NOTE — ED PROVIDER NOTES
EMERGENCY DEPARTMENT ENCOUNTER     NAME: Gabriela Eason   AGE: 28 year old female   YOB: 1995   MRN: [id number]   EVALUATION DATE & TIME: 12/3/2023  3:28 PM   PCP: Dax Trinidad [facility]     Chief Complaint   Patient presents with    Chest Pain    Rib Pain    Back Pain    Shortness of Breath   :    FINAL IMPRESSION       1. Primary spontaneous pneumothorax           ED COURSE & MEDICAL DECISION MAKING    3:33 PM  Introduced myself to the patient, obtained history of present illness, and performed initial physical exam at this time.   4:17 PM Discussed the case with the hospitalist, Dr. Junior, who agrees to take in the patient     Pertinent Labs & Imaging studies reviewed. (See chart for details)   28 year old female  presents to the Emergency Department for evaluation of shoulder pain that is pleuritic.  On chart review, patient does have a history of pneumothorax on each side, and even a VATS procedure back in 2017. Initial Vitals Reviewed. Initial exam notable for patient who is slightly uncomfortable appearing but with a pulse ox in the high 90s, intact breath sounds bilaterally which makes me not suspect a large pneumothorax, but I did discuss with her that we are going to start with an x-ray to look for small one that I could not hear given history.  If chest x-ray negative, then we would consider other things like pulmonary embolus, pleurisy and proceed with further workup.  Chest x-ray reviewed and interpreted by me and then read by radiology does show a very tiny left apical pneumothorax which is exactly where she is experiencing the discomfort.  On my evaluation, it is not large enough that it would not be safe to attempt chest tube placement.  I have placed her on nonrebreather oxygen therapy and I am giving IV pain control and I discussed the case with hospitalist for admission.           At the conclusion of the encounter I discussed the results of all of the tests and the  disposition. The questions were answered. The patient or family acknowledged understanding and was agreeable with the care plan.     0 minutes critical care time, see procedure note below for details if relevant    Medical Decision Making    History:  Supplemental history from: Documented in chart, if applicable and Family Member/Significant Other  External Record(s) reviewed: Documented in chart, if applicable.    Work Up:  Chart documentation includes differential considered and any EKGs or imaging independently interpreted by provider, where specified.  In additional to work up documented, I considered the following work up: Documented in chart, if applicable.    External consultation:  Discussion of management with another provider: Hospitalist    Complicating factors:  Care impacted by chronic illness: N/A  Care affected by social determinants of health: Access to Medical Care    Disposition considerations: Admit.        MEDICATIONS GIVEN IN THE EMERGENCY:   Medications - No data to display   NEW PRESCRIPTIONS STARTED AT TODAY'S ER VISIT   New Prescriptions    No medications on file     ================================================================   HISTORY OF PRESENT ILLNESS       Patient information was obtained from: Patient   Use of Intrepreter: N/A   Gabriela Eason is a 28 year old female with history of pneumothorax, acute respiratory failure, CPD, who presents with left sided chest pain. States since this morning has had severe pain to her left chest, from the top near her shoulder to right above her abdomen, along with radiation wrapping around her side to her back.      ================================================================        PAST HISTORY     PAST MEDICAL HISTORY:   Past Medical History:   Diagnosis Date    Acute respiratory failure (H) 03/24/2017    Anxiety     Depressive disorder     PONV (postoperative nausea and vomiting)     Posttraumatic stress disorder     Status post  "thoracostomy tube placement (H) 2017      PAST SURGICAL HISTORY:   Past Surgical History:   Procedure Laterality Date     SECTION N/A 2022    Procedure: PRIMARY  SECTION;  Surgeon: Michael Ortiz MD;  Location: [facility] OR    LAPAROSCOPIC SALPINGECTOMY Bilateral 10/28/2022    Procedure: LAPAROSCOPIC BILATERAL SALPINGECTOMY;  Surgeon: Michael Ortiz MD;  Location: [facility] OR    THORACOSCOPY Right 2017    Procedure: RIGHT VIDEO ASSISTED THORACOSCOPY WITH MECHANICAL PLEURODESIS AND APICAL PLEURECTOMY THROUGH A SUBXYPHIOD APPROACH;  Surgeon: Ricky Hernandez MD;  Location: [facility];  Service:     TONSILLECTOMY Bilateral     ZZC THORACOSCOPY,DX NO BX Left 2020    Procedure: THORACOSCOPY, LEFT LUNG WEDGE RESECTION, PLEURODESIS, CHEST TUBE PLACEMENT;  Surgeon: Dax Arias MD;  Location: [facility];  Service: General      CURRENT MEDICATIONS:   acetaminophen (TYLENOL) 325 MG tablet  famotidine (PEPCID) 20 MG tablet  ibuprofen (ADVIL/MOTRIN) 800 MG tablet  oxyCODONE (ROXICODONE) 5 MG tablet  oxyCODONE (ROXICODONE) 5 MG tablet  Prenatal Vit-Fe Fumarate-FA (PRENATAL MULTIVITAMIN W/IRON) 27-0.8 MG tablet      ALLERGIES:   Allergies   Allergen Reactions    Adhesive Tape Rash      FAMILY HISTORY:   No family history on file.   SOCIAL HISTORY:   Social History     Socioeconomic History    Marital status: Single   Tobacco Use    Smoking status: Never    Smokeless tobacco: Never   Substance and Sexual Activity    Alcohol use: No    Drug use: No    Sexual activity: Yes     Birth control/protection: Injection   Social History Narrative    ** Merged History Encounter **             VITALS  Patient Vitals for the past 24 hrs:   BP Temp Temp src Pulse Resp SpO2 Height Weight   23 1523 121/77 98.8  F (37.1  C) Temporal 94 22 99 % 1.715 m (5' 7.5\") 52 kg (114 lb 9.6 oz)    " "    ================================================================    PHYSICAL EXAM     VITAL SIGNS: /77   Pulse 94   Temp 98.8  F (37.1  C) (Temporal)   Resp 22   Ht 1.715 m (5' 7.5\")   Wt 52 kg (114 lb 9.6 oz)   SpO2 99%   BMI 17.68 kg/m     Constitutional:  Awake, no acute distress uncomfortable appearing.   HENT:  Atraumatic, oropharynx without exudate or erythema, membranes moist  Lymph:  No adenopathy  Eyes: EOM intact, PERRL, no injection  Neck: Supple  Respiratory:  Clear to auscultation bilaterally, no wheezes or crackles Intact breath sounds bilaterally.   Cardiovascular:  Regular rate and rhythm, single S1 and S2   GI:  Soft, nontender, nondistended, no rebound or guarding   Musculoskeletal:  Moves all extremities, no lower extremity edema, no deformities    Skin:  Warm, dry  Neurologic:  Alert and oriented x3, no focal deficits noted       ================================================================  LAB       All pertinent labs reviewed and interpreted.   Labs Ordered and Resulted from Time of ED Arrival to Time of ED Departure   BASIC METABOLIC PANEL - Normal       Result Value    Sodium 138      Potassium 3.8      Chloride 104      Carbon Dioxide (CO2) 23      Anion Gap 11      Urea Nitrogen 10.9      Creatinine 0.68      GFR Estimate >90      Calcium 8.8      Glucose 86     CBC WITH PLATELETS AND DIFFERENTIAL    WBC Count 4.9      RBC Count 4.50      Hemoglobin 13.5      Hematocrit 39.9      MCV 89      MCH 30.0      MCHC 33.8      RDW 11.9      Platelet Count 170      % Neutrophils 57      % Lymphocytes 34      % Monocytes 8      % Eosinophils 1      % Basophils 0      % Immature Granulocytes 0      NRBCs per 100 WBC 0      Absolute Neutrophils 2.7      Absolute Lymphocytes 1.7      Absolute Monocytes 0.4      Absolute Eosinophils 0.1      Absolute Basophils 0.0      Absolute Immature Granulocytes 0.0      Absolute NRBCs 0.0      "     ===============================================================  RADIOLOGY       Reviewed all pertinent imaging. Please see official radiology report.   Chest XR,  PA & LAT   Final Result   IMPRESSION: Small left pneumothorax measures 1 cm at the apex. The lungs are clear.            ================================================================  EKG     EKG reviewed and interpreted by me shows sinus rhythm with rate of 80, normal axis, QTc 419 with no acute ST or T wave changes    I have independently reviewed and interpreted the EKG(s) documented above.     ================================================================  PROCEDURES         I, Reji Mcgee, am serving as a scribe to document services personally performed by Dr. Lang based on my observation and the provider's statements to me. I, Mary Lang MD attest that Reji Mcgee is acting in a scribe capacity, has observed my performance of the services and has documented them in accordance with my direction.   Mary Lang M.D.   Emergency Medicine   [facility] EMERGENCY DEPARTMENT  [address] [place] [id number]  [phone]  Dept: [phone]      Mary Lang MD  12/03/23 4772

## 2023-12-03 NOTE — H&P
Admission History and Physical   Gabriela Eason,    1995,   [id number]    [facility]   Primary spontaneous pneumothorax [J93.11]    PCP: Dax Trinidad [facility],    Code status:  Prior       Extended Emergency Contact Information  Primary Emergency Contact: Nena Fowler  Address: [address]           [place] [address] [place]  Home Phone: [phone]  Relation: Aunt  Secondary Emergency Contact: GENE DRIVER  Address: [address]           [place] [address] [place]  Mobile Phone: [phone]  Relation: Significant other           Has history of right VATS with mechanical pleurodesis and apical pleurectomy through the subxiphoid approach in 2017.  Also has a history of a left pneumothorax with fluoroscopy thoracoscopy, left wedge lung resection and pleurodesis in .       ASSESSMENT AND PLAN:  Spontaneous small left pneumothorax, history of recurrent pneumothoraces in the past, admitted for pulmonary evaluation  Left-sided chest pain due to above, continue analgesia aggressively as ordered  Mild hypoxia from pneumothorax, continue oxygen via nonrebreather mask  History anxiety/depressive disorder, not currently symptomatic  History of PTSD currently stable    Plan  Admit to cardiac monitoring  Pulmonary consulted for evaluation  Continue oxygen via nonrebreather mask  Narcotic analgesia ordered  Close hemodynamic monitoring, ICU consult if she deteriorates overnight  Clear liquid diet in case procedure is needed tomorrow    Full code      DVT PPX:  Mechanical    Barriers to discharge    Anticipated Discharge date observation          Chief Complaint:  Chest pain today    HPI:  Gabriela Eason is a 28 year old female with history of pneumothorax, acute respiratory failure, CPD, who presents with left sided chest pain. States since this morning has had severe pain to her left chest, from the top near her shoulder to right above her abdomen,  along with radiation wrapping around her side to her back.  This happened in the past when she was diagnosed with a pneumothorax.  No cough or fever, no chills no PND orthopnea no chest symptoms otherwise.  Chest x-ray in the ED confirmed a small left pneumothorax.  Vital signs are stable and she is admitted for pulmonary evaluation.        Medical History  Past Medical History:   Diagnosis Date    Acute respiratory failure (H) 2017    Anxiety     Depressive disorder     PONV (postoperative nausea and vomiting)     Posttraumatic stress disorder     Status post thoracostomy tube placement (H) 2017          Surgical History  She  has a past surgical history that includes Thoracoscopy (Right, 2017); THORACOSCOPY,DX NO BX (Left, 2020); tonsillectomy (Bilateral);  section (N/A, 2022); and Laparoscopic salpingectomy (Bilateral, 10/28/2022).      SOCIAL HISTORY:  Social History     Socioeconomic History    Marital status: Single     Spouse name: Not on file    Number of children: Not on file    Years of education: Not on file    Highest education level: Not on file   Occupational History    Not on file   Tobacco Use    Smoking status: Never    Smokeless tobacco: Never   Substance and Sexual Activity    Alcohol use: No    Drug use: No    Sexual activity: Yes     Birth control/protection: Injection   Other Topics Concern    Not on file   Social History Narrative    ** Merged History Encounter **          Social Determinants of Health     Financial Resource Strain: Not on file   Food Insecurity: Not on file   Transportation Needs: Not on file   Physical Activity: Not on file   Stress: Not on file   Social Connections: Not on file   Interpersonal Safety: Not on file   Housing Stability: Not on file       FAMILY HISTORY:  No family history on file.      ALLERGIES:  Allergies   Allergen Reactions    Adhesive Tape Rash       MEDICATIONS: See pharmacy note        ROS:  12 point review of systems  "reviewed and is negative except as stated above      PHYSICAL EXAM:  /73   Pulse 87   Temp 98.8  F (37.1  C) (Temporal)   Resp 15   Ht 1.715 m (5' 7.5\")   Wt 52 kg (114 lb 9.6 oz)   SpO2 100%   BMI 17.68 kg/m    Patient examined with female nurse chaperone  General: Alert and oriented x 3. Not in obvious distress.  HEENT: Pupils equal and reactive,ENT WNL   Neck- supple, No JVP elevation, lymphadenopathy or thyromegaly. Trachea-central.  Chest: Clear to auscultation bilaterally.  Reduced breath sounds due to pain  Heart: S1S2 regular. No M/R/G.  Abdomen: Soft. NT, ND. No organomegaly. Bowel sounds- active.  Back: No spine tenderness. No CVA tenderness.  Extremities: No leg swelling. Peripheral pulses 2+ bilaterally.  Neuro: No focal neurological deficit  Skin: no skin rashes     DIAGNOSTIC DATA: Case discussed extensively ED physician      EKG Results: Personally reviewed no acute ischemia        Advanced Care Planning       Livan Junior MD     "

## 2023-12-04 ENCOUNTER — APPOINTMENT (OUTPATIENT)
Dept: INTERVENTIONAL RADIOLOGY/VASCULAR | Facility: HOSPITAL | Age: 28
End: 2023-12-04
Payer: COMMERCIAL

## 2023-12-04 ENCOUNTER — APPOINTMENT (OUTPATIENT)
Dept: RADIOLOGY | Facility: HOSPITAL | Age: 28
End: 2023-12-04
Attending: INTERNAL MEDICINE
Payer: COMMERCIAL

## 2023-12-04 ENCOUNTER — APPOINTMENT (OUTPATIENT)
Dept: CT IMAGING | Facility: HOSPITAL | Age: 28
End: 2023-12-04
Attending: SURGERY
Payer: COMMERCIAL

## 2023-12-04 PROCEDURE — 250N000013 HC RX MED GY IP 250 OP 250 PS 637

## 2023-12-04 PROCEDURE — 250N000011 HC RX IP 250 OP 636: Mod: JZ | Performed by: INTERNAL MEDICINE

## 2023-12-04 PROCEDURE — 250N000013 HC RX MED GY IP 250 OP 250 PS 637: Performed by: INTERNAL MEDICINE

## 2023-12-04 PROCEDURE — 120N000001 HC R&B MED SURG/OB

## 2023-12-04 PROCEDURE — 250N000011 HC RX IP 250 OP 636

## 2023-12-04 PROCEDURE — C1769 GUIDE WIRE: HCPCS

## 2023-12-04 PROCEDURE — G0378 HOSPITAL OBSERVATION PER HR: HCPCS

## 2023-12-04 PROCEDURE — 71045 X-RAY EXAM CHEST 1 VIEW: CPT

## 2023-12-04 PROCEDURE — 272N000500 HC NEEDLE CR2

## 2023-12-04 PROCEDURE — 32557 INSERT CATH PLEURA W/ IMAGE: CPT

## 2023-12-04 PROCEDURE — 71250 CT THORAX DX C-: CPT

## 2023-12-04 PROCEDURE — 99222 1ST HOSP IP/OBS MODERATE 55: CPT | Performed by: INTERNAL MEDICINE

## 2023-12-04 PROCEDURE — 96376 TX/PRO/DX INJ SAME DRUG ADON: CPT

## 2023-12-04 PROCEDURE — 250N000009 HC RX 250

## 2023-12-04 PROCEDURE — 99207 PR APP CREDIT; MD BILLING SHARED VISIT: CPT | Mod: FS

## 2023-12-04 PROCEDURE — 250N000009 HC RX 250: Performed by: INTERNAL MEDICINE

## 2023-12-04 PROCEDURE — 99207 PR NO BILLABLE SERVICE THIS VISIT: CPT | Mod: FS | Performed by: FAMILY MEDICINE

## 2023-12-04 PROCEDURE — C1729 CATH, DRAINAGE: HCPCS

## 2023-12-04 PROCEDURE — 0W9B30Z DRAINAGE OF LEFT PLEURAL CAVITY WITH DRAINAGE DEVICE, PERCUTANEOUS APPROACH: ICD-10-PCS

## 2023-12-04 RX ORDER — NALOXONE HYDROCHLORIDE 0.4 MG/ML
0.2 INJECTION, SOLUTION INTRAMUSCULAR; INTRAVENOUS; SUBCUTANEOUS
Status: DISCONTINUED | OUTPATIENT
Start: 2023-12-04 | End: 2023-12-07 | Stop reason: HOSPADM

## 2023-12-04 RX ORDER — FLUMAZENIL 0.1 MG/ML
0.2 INJECTION, SOLUTION INTRAVENOUS
Status: DISCONTINUED | OUTPATIENT
Start: 2023-12-04 | End: 2023-12-07

## 2023-12-04 RX ORDER — LIDOCAINE/PRILOCAINE 2.5 %-2.5%
CREAM (GRAM) TOPICAL ONCE
Status: COMPLETED | OUTPATIENT
Start: 2023-12-04 | End: 2023-12-04

## 2023-12-04 RX ORDER — FENTANYL CITRATE 50 UG/ML
25-50 INJECTION, SOLUTION INTRAMUSCULAR; INTRAVENOUS EVERY 5 MIN PRN
Status: DISCONTINUED | OUTPATIENT
Start: 2023-12-04 | End: 2023-12-07

## 2023-12-04 RX ORDER — ONDANSETRON 2 MG/ML
4 INJECTION INTRAMUSCULAR; INTRAVENOUS
Status: DISCONTINUED | OUTPATIENT
Start: 2023-12-04 | End: 2023-12-07 | Stop reason: HOSPADM

## 2023-12-04 RX ORDER — NALOXONE HYDROCHLORIDE 0.4 MG/ML
0.4 INJECTION, SOLUTION INTRAMUSCULAR; INTRAVENOUS; SUBCUTANEOUS
Status: DISCONTINUED | OUTPATIENT
Start: 2023-12-04 | End: 2023-12-07 | Stop reason: HOSPADM

## 2023-12-04 RX ORDER — ACETAMINOPHEN 325 MG/1
650 TABLET ORAL EVERY 6 HOURS
Status: DISCONTINUED | OUTPATIENT
Start: 2023-12-04 | End: 2023-12-07 | Stop reason: HOSPADM

## 2023-12-04 RX ORDER — ACETAMINOPHEN 650 MG/1
650 SUPPOSITORY RECTAL EVERY 6 HOURS
Status: DISCONTINUED | OUTPATIENT
Start: 2023-12-04 | End: 2023-12-07 | Stop reason: HOSPADM

## 2023-12-04 RX ADMIN — HYDROMORPHONE HYDROCHLORIDE 0.2 MG: 0.2 INJECTION, SOLUTION INTRAMUSCULAR; INTRAVENOUS; SUBCUTANEOUS at 01:19

## 2023-12-04 RX ADMIN — MIDAZOLAM 1 MG: 1 INJECTION INTRAMUSCULAR; INTRAVENOUS at 15:10

## 2023-12-04 RX ADMIN — HYDROMORPHONE HYDROCHLORIDE 0.4 MG: 0.2 INJECTION, SOLUTION INTRAMUSCULAR; INTRAVENOUS; SUBCUTANEOUS at 03:22

## 2023-12-04 RX ADMIN — HYDROMORPHONE HYDROCHLORIDE 0.4 MG: 0.2 INJECTION, SOLUTION INTRAMUSCULAR; INTRAVENOUS; SUBCUTANEOUS at 11:16

## 2023-12-04 RX ADMIN — HYDROMORPHONE HYDROCHLORIDE 0.4 MG: 0.2 INJECTION, SOLUTION INTRAMUSCULAR; INTRAVENOUS; SUBCUTANEOUS at 16:30

## 2023-12-04 RX ADMIN — LIDOCAINE HYDROCHLORIDE 10 ML: 10 INJECTION, SOLUTION INFILTRATION; PERINEURAL at 15:19

## 2023-12-04 RX ADMIN — MIDAZOLAM 1 MG: 1 INJECTION INTRAMUSCULAR; INTRAVENOUS at 15:15

## 2023-12-04 RX ADMIN — PANTOPRAZOLE SODIUM 40 MG: 40 TABLET, DELAYED RELEASE ORAL at 06:58

## 2023-12-04 RX ADMIN — ACETAMINOPHEN 650 MG: 325 TABLET, FILM COATED ORAL at 16:30

## 2023-12-04 RX ADMIN — LIDOCAINE AND PRILOCAINE: 25; 25 CREAM TOPICAL at 19:11

## 2023-12-04 RX ADMIN — FENTANYL CITRATE 50 MCG: 50 INJECTION, SOLUTION INTRAMUSCULAR; INTRAVENOUS at 15:12

## 2023-12-04 RX ADMIN — HYDROMORPHONE HYDROCHLORIDE 0.4 MG: 0.2 INJECTION, SOLUTION INTRAMUSCULAR; INTRAVENOUS; SUBCUTANEOUS at 08:11

## 2023-12-04 RX ADMIN — HYDROMORPHONE HYDROCHLORIDE 0.4 MG: 0.2 INJECTION, SOLUTION INTRAMUSCULAR; INTRAVENOUS; SUBCUTANEOUS at 20:15

## 2023-12-04 RX ADMIN — FENTANYL CITRATE 50 MCG: 50 INJECTION, SOLUTION INTRAMUSCULAR; INTRAVENOUS at 15:17

## 2023-12-04 RX ADMIN — ACETAMINOPHEN 650 MG: 325 TABLET, FILM COATED ORAL at 20:16

## 2023-12-04 ASSESSMENT — ACTIVITIES OF DAILY LIVING (ADL)
ADLS_ACUITY_SCORE: 35
ADLS_ACUITY_SCORE: 18
ADLS_ACUITY_SCORE: 35
DEPENDENT_IADLS:: INDEPENDENT
ADLS_ACUITY_SCORE: 35

## 2023-12-04 NOTE — PROGRESS NOTES
[facility]    Medicine Progress Note - Hospitalist Service    Date of Admission:  12/3/2023    Assessment & Plan   Gabriela Eason is a 28 year old female with history of pevious pneumothorax, s/p underwent right VATS with mechanical pleurodesis, who presents with left sided chest pain. Patient was admitted 12/3/23 for left-sided spontaneous pneumothorax.     Per chart review: Pneumothorax back in 2017. Underwent right VATS with mechanical pleurodesis and apical pleurectomy through subxiphoid approach. Left side pneumothorax on March 2020, patient was seen by Dr. Arias and underwent thoracoscopy, left lung wedge resection and pleurodesis. Seen in ED on January and March 2022 diagnosed with small right side pneumothorax, discharged home after observation.     Spontaneous pneumothroax, left, small   -  CXR trending 1 cm (12/3) --> 1.5cm/1.4cm (12/4)  - CT chest 12/4 showing small, L pneumothorax, small scarring in L lung apex, bilateral upper lobe wedge resection   - Appreciate pulm consult, continue to follow   - Appreciate gen surg consult; who will proceed with placement of chest tube vs. aspiration of air with potential of Heimlich valve  - Continue O2 at 2-3L face mask  - Pain management with scheduled tylenol, PRN PO/IV Diluadid    Atypical chest pain  Suspect secondary to PNX   - EKG in ED shows sinus rhythm, no acute ST or T wave changes per my review  Without significant ACS RF  Continue pain management as above          Diet: Combination Diet Clear Liquid    DVT Prophylaxis: Low Risk/Ambulatory with no VTE prophylaxis indicated, Pneumatic Compression Devices, and Ambulate every shift  Cho Catheter: Not present  Lines: None     Cardiac Monitoring: ACTIVE order. Indication: Tachyarrhythmias, acute (48 hours)  Code Status: Full Code      Clinically Significant Risk Factors Present on Admission                       # Cachexia: Estimated body mass index is 17.68 kg/m  as calculated  "from the following:    Height as of this encounter: 1.715 m (5' 7.5\").    Weight as of this encounter: 52 kg (114 lb 9.6 oz).              Disposition Plan      Expected Discharge Date: 12/04/2023                  The patient's care was discussed with the Attending Physician, Dr. Valeria Norton who independently met with and assessed the patient and is agreement with the assessment and plan     Talita Ng PA-C  Hospitalist Service  [facility]  Securely message with [employer] (more info)  Text page via [employer] Paging/Directory   ______________________________________________________________________    Interval History   Patient continuing with some left sided chest/shoulder pain. Describes 9/10 pain before receiving Diluadid. 2/10 pain with Diluadid. Otherwise states she is doing well - no SOB, wheezing, difficulty breathing.     Physical Exam   Vital Signs: Temp: 99  F (37.2  C) Temp src: Oral BP: 100/58 Pulse: 64   Resp: 11 SpO2: 100 % O2 Device: None (Room air) Oxygen Delivery: 2 LPM  Weight: 114 lbs 9.6 oz    Constitutional: awake, alert, no apparent distress, and appears stated age  Respiratory: No increased work of breathing, no accessory muscle use, clear to auscultation bilaterally. No tracheal shift. NC in place.   Cardiovascular: Regular rate and rhythm, normal S1 and S2  Skin: Normal skin color, texture, turgor. No rashes and no jaundice  Neuropsychiatric: Appropriate mood, affect and eye contact. Cooperative.    Medical Decision Making             Data     I have personally reviewed the following data over the past 24 hrs:    4.9  \   13.5   / 170     138 104 10.9 /  86   3.8 23 0.68 \       Imaging results reviewed over the past 24 hrs:   Recent Results (from the past 24 hour(s))   Chest XR,  PA & LAT    Narrative    EXAM: XR CHEST 2 VIEWS  LOCATION: [facility]  DATE: 12/3/2023    INDICATION: possible pneumothorax  COMPARISON: 10/13/2022      Impression    " IMPRESSION: Small left pneumothorax measures 1 cm at the apex. The lungs are clear.   XR Chest 1 View    Narrative    EXAM: XR CHEST 1 VIEW  LOCATION: [facility]  DATE: 12/3/2023    INDICATION: Follow up left side pneumothorax  COMPARISON: 12/03/2023 1533 hours      Impression    IMPRESSION: Heart size and pulmonary vascularity normal. Left upper pneumothorax has slightly increased in size, now with maximal width measuring 15 mm, previously measuring 10 mm. Surgical changes medial aspects of both upper lobes. No acute infiltrates   or effusions.   XR Chest Port 1 View    Narrative    EXAM: CHEST SINGLE VIEW PORTABLE  LOCATION: [facility]  DATE/TIME: 12/4/2023 5:50 AM CST    INDICATION: Left-sided pneumothorax. Follow-up.  COMPARISON: 12/03/2023 at 1846 hours.      Impression    IMPRESSION:   1. No convincing interval change since the recent comparison study.  2. Small left pneumothorax which measures 1.4 cm from the visceral to parietal pleura at the lateral aspect of the upper left hemithorax, not convincingly changed.                  CT Chest w/o Contrast    Narrative    EXAM: CT CHEST W/O CONTRAST  LOCATION: [facility]  DATE: 12/4/2023    INDICATION: pneumothorax on recent chest x-ray, follow-up exam  COMPARISON: Chest radiograph from earlier today, chest CT from 03/05/2022  TECHNIQUE: CT chest without IV contrast. Multiplanar reformats were obtained. Dose reduction techniques were used.  CONTRAST: None.    FINDINGS:   LUNGS AND PLEURA: Again seen is the small left-sided pneumothorax. There is slight scarring in the pleural space in the left lung apex which may be from prior intervention. Patchy areas of atelectasis in the left lung. Wedge resection changes in the lung   apices. No effusions or suspicious pulmonary nodules.    MEDIASTINUM/AXILLAE: Heart size is normal. No lymphadenopathy. No thoracic aortic aneurysm.    CORONARY ARTERY  CALCIFICATION: None.    UPPER ABDOMEN: No significant finding.    MUSCULOSKELETAL: No acute osseous findings.      Impression    IMPRESSION:   1.  Similar small left pneumothorax. Small amount of linear scarring in the left lung apex pleural space which may be due to prior intervention.  2.  Bilateral upper lobe wedge resection changes.

## 2023-12-04 NOTE — PROCEDURES
Interventional Radiology Post-Procedure Note   ?   Brief Procedure Note:   Patient name: Gabriela Eason  Pt MRN:[id number]   Date of procedure: 12/4/2023     Procedure(s): Image guided left chest tube placement  Sedation method: Moderate sedation was employed. The patient was monitored by an interventional radiology nurse at all times throughout the procedure under my direct guidance.  Pre Procedure Diagnosis: left pneumothorax  Post Procedure Diagnosis: Same  Indications: Symtpomatic left pneumothorax    Attending: Cecilio Meneses M.D.    Specimen(s) removed: None   Additional studies ordered: None  Drains: Left 10 Fr pleural drain  Estimated Blood Loss: Minimal  Complications: None  Vascular closure method: N/A    Findings/Notes/Comments:   Successful image guided placement of left chest tube. Tube placed to water seal while in IR, may be placed to -20cm suction when reach the floor.  Can be converted to Heimlich valve at time of discharge.  ?   Please see dictation in PACS or under the Imaging tab in [employer] for detailed procedure note.     Cecilio Meneses M.D.   Vascular and Interventional Radiology   Pager: [phone]   After Hours / Scheduling: [phone]     12/4/2023  3:40 PM

## 2023-12-04 NOTE — CONSULTS
"  Interventional Radiology - Pre-Procedure Note:  Inpatient - [facility]  12/04/2023     Procedure Requested: LEFT chest tube placement  Requested by: Dax Arias MD     Brief HPI: Gabriela Eason is a 28 year old female with a PMH of anxiety and recurrent pneumothoraces s/p RIGHT VATS with mechanical pleurodesis and apical pleurectomy through the subxiphoid approach (2017) and LEFT fluoroscopy thoracoscopy, LEFT wedge lung resection and pleurodesis (2020) who was admitted to [facility] on 12/03/2023 with a spontaneous small LEFT pneumothorax seen on imaging. Admitted, Pulmonary and Surgery consult. Surgery requesting chest tube placement \"or aspiration of air with potential of Heimlich valve\" per note.      IMAGING:  CT Chest w/o Contrast 12/04/2023  EXAM: CT CHEST W/O CONTRAST  LOCATION: [facility]  DATE: 12/4/2023     INDICATION: pneumothorax on recent chest x-ray, follow-up exam  COMPARISON: Chest radiograph from earlier today, chest CT from 03/05/2022  TECHNIQUE: CT chest without IV contrast. Multiplanar reformats were obtained. Dose reduction techniques were used.  CONTRAST: None.     FINDINGS:   LUNGS AND PLEURA: Again seen is the small left-sided pneumothorax. There is slight scarring in the pleural space in the left lung apex which may be from prior intervention. Patchy areas of atelectasis in the left lung. Wedge resection changes in the lung   apices. No effusions or suspicious pulmonary nodules.     MEDIASTINUM/AXILLAE: Heart size is normal. No lymphadenopathy. No thoracic aortic aneurysm.     CORONARY ARTERY CALCIFICATION: None.     UPPER ABDOMEN: No significant finding.     MUSCULOSKELETAL: No acute osseous findings.                                                                      IMPRESSION:   1.  Similar small left pneumothorax. Small amount of linear scarring in the left lung apex pleural space which may be due to prior intervention.  2.  " "Bilateral upper lobe wedge resection changes.    NPO: Clear liquids 1230, ok for moderate sedation 1430  ANTICOAGULANTS: None  ANTIBIOTICS: Not needed    ALLERGIES  Allergies   Allergen Reactions    Adhesive Tape Rash         LABS:  No results found for: \"INR\"   Hemoglobin   Date Value Ref Range Status   12/03/2023 13.5 11.7 - 15.7 g/dL Final     Platelet Count   Date Value Ref Range Status   12/03/2023 170 150 - 450 10e3/uL Final     Creatinine   Date Value Ref Range Status   12/03/2023 0.68 0.51 - 0.95 mg/dL Final     Potassium   Date Value Ref Range Status   12/03/2023 3.8 3.4 - 5.3 mmol/L Final   05/28/2022 3.8 3.5 - 5.0 mmol/L Final         EXAM:  BP 96/58 (BP Location: Left arm)   Pulse 66   Temp 99.2  F (37.3  C) (Oral)   Resp 21   Ht 1.715 m (5' 7.5\")   Wt 52 kg (114 lb 9.6 oz)   SpO2 100%   BMI 17.68 kg/m    General:  Stable.  In no acute distress.    Neuro:  A&O x 3. Moves all extremities equally.  Resp:  Lungs clear to auscultation bilaterally.  Cardio:  S1S2 and reg, without murmur, clicks or rubs.  Skin:  Without excoriations, ecchymosis, erythema, lesions or open sores.    Pre-Sedation Assessment:  Mallampati Airway Classification:  I - Faucial pillars, soft palate, and uvula are visible  Previous reaction to anesthesia/sedation:  No  Sedation plan based on assessment: Moderate (conscious) sedation as needed  ASA Classification: Class 3 - SEVERE SYSTEMIC DISEASE, DEFINITE FUNCTIONAL LIMITATIONS.   Code Status: FULL CODE      ASSESSMENT/PLAN:   LEFT nontunneled chest tube placement with sedation as needed.    Procedural education reviewed with patient in detail including, but not limited to risks, benefits and alternatives with understanding verbalized by patient.    Total time spent on the date of the encounter: 45 minutes.      JUAN RAMON LUCERO CNP  Interventional Radiology    "

## 2023-12-04 NOTE — PROGRESS NOTES
"PRIMARY DIAGNOSIS: \"GENERIC\" NURSING  OUTPATIENT/OBSERVATION GOALS TO BE MET BEFORE DISCHARGE:  ADLs back to baseline: Yes    Activity and level of assistance: Ambulating independently.    Pain status: Improved but still requiring IV narcotics.    Return to near baseline physical activity: Yes     Discharge Planner Nurse   Safe discharge environment identified: Yes  Barriers to discharge: Yes       Entered by: Madan Barbosa RN 12/04/2023 2:40 PM     Please review provider order for any additional goals.   Nurse to notify provider when observation goals have been met and patient is ready for discharge.  "

## 2023-12-04 NOTE — ED NOTES
Handed off to IRWIN Hays.     Patient put call light on and reported that her IV hurts and she would like the site changed. Additionally patient reported that her chest was hurting. Lis GAYLE - aware   Berry

## 2023-12-04 NOTE — PLAN OF CARE
Goal Outcome Evaluation:                  PRIMARY DIAGNOSIS: ACUTE PAIN  OUTPATIENT/OBSERVATION GOALS TO BE MET BEFORE DISCHARGE:  1. Pain Status: Improved but still requiring IV narcotics.    2. Return to near baseline physical activity: Yes    3. Cleared for discharge by consultants (if involved): No    Discharge Planner Nurse   Safe discharge environment identified: Yes  Barriers to discharge: Yes       Entered by: Vu Matute RN 12/04/2023 3:43 AM     Please review provider order for any additional goals.   Nurse to notify provider when observation goals have been met and patient is ready for discharge. Pt is alert and oriented. Pt now on RA with saturation above 90%. Continues to c/o pain to left chest area radiating to right side. Telemetry reading NSR.

## 2023-12-04 NOTE — CONSULTS
Care Management Initial Consult    General Information  Assessment completed with: Patient,    Type of CM/SW Visit: Initial Assessment    Primary Care Provider verified and updated as needed: Yes   Readmission within the last 30 days: no previous admission in last 30 days         Advance Care Planning: Advance Care Planning Reviewed:  (no HCD)          Communication Assessment  Patient's communication style: spoken language (English or Bilingual)             Cognitive  Cognitive/Neuro/Behavioral: WDL                      Living Environment:   People in home: child(gino), dependent, significant other     Current living Arrangements: apartment      Able to return to prior arrangements: yes       Family/Social Support:  Care provided by: self  Provides care for: child(gino)  Marital Status: Lives with Significant Other  Significant Other (Aunt)          Description of Support System: Supportive, Involved         Current Resources:   Patient receiving home care services: No     Community Resources: OP Mental Health  Equipment currently used at home:  None  Supplies currently used at home: None    Employment/Financial:  Employment Status: employed full-time          Does the patient's insurance plan have a 3 day qualifying hospital stay waiver?  No    Lifestyle & Psychosocial Needs:  Social Determinants of Health     Food Insecurity: Not on file   Depression: Not on file   Housing Stability: Not on file   Tobacco Use: Low Risk  (12/2/2023)    Patient History     Smoking Tobacco Use: Never     Smokeless Tobacco Use: Never     Passive Exposure: Not on file   Financial Resource Strain: Not on file   Alcohol Use: Not on file   Transportation Needs: Not on file   Physical Activity: Not on file   Interpersonal Safety: Not on file   Stress: Not on file   Social Connections: Not on file       Functional Status:  Prior to admission patient needed assistance:   Dependent ADLs:: Independent  Dependent IADLs:: Independent       Mental  Health Status:  Mental Health Status: No Current Concerns  Mental Health Management: Psychiatrist    Chemical Dependency Status:  Chemical Dependency Status: No Current Concerns               Additional Information:    Assessment completed with patient. Patient reports she lives in her apartment with her significant other Vu and baby. She is independent with ADLs and IADLs, ambulates without devices, drives and works full time.  She has outpatient MH support with her therapist and has emotional support cat and dog. She states Vu is primary family contact and decision maker if needed.  Family will transport at discharge.    Final discharge plan pending progression and recommendations.          Ester Thomason RN

## 2023-12-04 NOTE — IR NOTE
Patient Name: Gabriela Eason  Medical Record Number: [id number]  Today's Date: 12/4/2023    Procedure: chest tube  Proceduralist: Dr Meneses    Procedure Start: 1518  Procedure end: 1527  Sedation medications administered: 2 mg midazolam and 100 mcg fentanyl   Sedation time: 9  minutes      
Pt brought to the floor via cart. Report given to floor RN. Pt stable at this time  
Abdominal Pain, N/V/D

## 2023-12-04 NOTE — PROGRESS NOTES
[facility]    Medicine Progress Note - Hospitalist Service    Date of Admission:  12/3/2023    Assessment & Plan   Gabriela Eason is a 28 year old female with history of pevious pneumothorax, s/p underwent right VATS with mechanical pleurodesis, who presents with left sided chest pain. Patient was admitted 12/3/23 for left-sided spontaneous pneumothorax.     Per chart review: Pneumothorax back in 2017. Underwent right VATS with mechanical pleurodesis and apical pleurectomy through subxiphoid approach. Left side pneumothorax on March 2020, patient was seen by Dr. Arias and underwent thoracoscopy, left lung wedge resection and pleurodesis. Seen in ED on January and March 2022 diagnosed with small right side pneumothorax, discharged home after observation.     Spontaneous pneumothroax, left, small   -  CXR trending 1 cm (12/3) --> 1.5cm/1.4cm (12/4)  - CT chest 12/4 showing small, L pneumothorax, small scarring in L lung apex, bilateral upper lobe wedge resection   - Appreciate pulm consult, continue to follow   - Appreciate gen surg consult; who will proceed with placement of chest tube vs. aspiration of air with potential of Heimlich valve  - Continue O2 at 2-3L face mask  - Pain management with scheduled tylenol, PRN PO/IV Diluadid    Atypical chest pain  Suspect secondary to PNX   - EKG in ED shows sinus rhythm, no acute ST or T wave changes per my review  Without significant ACS RF  Continue pain management as above          Diet: Combination Diet Clear Liquid    DVT Prophylaxis: Low Risk/Ambulatory with no VTE prophylaxis indicated, Pneumatic Compression Devices, and Ambulate every shift  Cho Catheter: Not present  Lines: None     Cardiac Monitoring: ACTIVE order. Indication: Tachyarrhythmias, acute (48 hours)  Code Status: Full Code      Clinically Significant Risk Factors Present on Admission                       # Cachexia: Estimated body mass index is 17.68 kg/m  as calculated  "from the following:    Height as of this encounter: 1.715 m (5' 7.5\").    Weight as of this encounter: 52 kg (114 lb 9.6 oz).              Disposition Plan      Expected Discharge Date: 12/06/2023    Discharge Delays: Specialist Consult (enter specialist & decision needed in comments)  Oxygen Needs - Arrange Home O2  IV Medication - consider oral or Home Infusion              The patient's care was discussed with the Attending Physician, Dr. Valeria Norton who independently met with and assessed the patient and is agreement with the assessment and plan     Talita Ng PA-C  Hospitalist Service  [facility]  Securely message with [employer] (more info)  Text page via [employer] Paging/Directory   ______________________________________________________________________    Interval History   Patient continuing with some left sided chest/shoulder pain. Describes 9/10 pain before receiving Diluadid. 2/10 pain with Diluadid. Otherwise states she is doing well - no SOB, wheezing, difficulty breathing.     Physical Exam   Vital Signs: Temp: 98.1  F (36.7  C) Temp src: Oral BP: 104/66 Pulse: 68   Resp: 16 SpO2: 100 % O2 Device: None (Room air) Oxygen Delivery: 10 LPM  Weight: 114 lbs 9.6 oz    Constitutional: awake, alert, no apparent distress, and appears stated age  Respiratory: No increased work of breathing, no accessory muscle use, clear to auscultation bilaterally. No tracheal shift. NC in place.   Cardiovascular: Regular rate and rhythm, normal S1 and S2  Skin: Normal skin color, texture, turgor. No rashes and no jaundice  Neuropsychiatric: Appropriate mood, affect and eye contact. Cooperative.    Medical Decision Making             Data     I have personally reviewed the following data over the past 24 hrs:    4.9  \   13.5   / 170     138 104 10.9 /  86   3.8 23 0.68 \       Imaging results reviewed over the past 24 hrs:   Recent Results (from the past 24 hour(s))   XR Chest 1 View    Narrative    EXAM: XR " CHEST 1 VIEW  LOCATION: [facility]  DATE: 12/3/2023    INDICATION: Follow up left side pneumothorax  COMPARISON: 12/03/2023 1533 hours      Impression    IMPRESSION: Heart size and pulmonary vascularity normal. Left upper pneumothorax has slightly increased in size, now with maximal width measuring 15 mm, previously measuring 10 mm. Surgical changes medial aspects of both upper lobes. No acute infiltrates   or effusions.   XR Chest Port 1 View    Narrative    EXAM: CHEST SINGLE VIEW PORTABLE  LOCATION: [facility]  DATE/TIME: 12/4/2023 5:50 AM CST    INDICATION: Left-sided pneumothorax. Follow-up.  COMPARISON: 12/03/2023 at 1846 hours.      Impression    IMPRESSION:   1. No convincing interval change since the recent comparison study.  2. Small left pneumothorax which measures 1.4 cm from the visceral to parietal pleura at the lateral aspect of the upper left hemithorax, not convincingly changed.                  CT Chest w/o Contrast    Narrative    EXAM: CT CHEST W/O CONTRAST  LOCATION: [facility]  DATE: 12/4/2023    INDICATION: pneumothorax on recent chest x-ray, follow-up exam  COMPARISON: Chest radiograph from earlier today, chest CT from 03/05/2022  TECHNIQUE: CT chest without IV contrast. Multiplanar reformats were obtained. Dose reduction techniques were used.  CONTRAST: None.    FINDINGS:   LUNGS AND PLEURA: Again seen is the small left-sided pneumothorax. There is slight scarring in the pleural space in the left lung apex which may be from prior intervention. Patchy areas of atelectasis in the left lung. Wedge resection changes in the lung   apices. No effusions or suspicious pulmonary nodules.    MEDIASTINUM/AXILLAE: Heart size is normal. No lymphadenopathy. No thoracic aortic aneurysm.    CORONARY ARTERY CALCIFICATION: None.    UPPER ABDOMEN: No significant finding.    MUSCULOSKELETAL: No acute osseous findings.      Impression     IMPRESSION:   1.  Similar small left pneumothorax. Small amount of linear scarring in the left lung apex pleural space which may be due to prior intervention.  2.  Bilateral upper lobe wedge resection changes.     IR Chest Tube Place Non Tunneled Left    Narrative    [place] [facility]  LOCATION: [facility]  DATE: 12/4/2023    PROCEDURE:  1. FLUOROSCOPICALLY GUIDED CHEST TUBE PLACEMENT  2. MODERATE SEDATION    INTERVENTIONAL RADIOLOGIST: Cecilio Meneses MD    INDICATION: Patient is a 28 old female with a history of left pneumothorax with severe pain who presents for left-sided chest tube placement.    CONSENT: The risks, benefits and alternatives of left-sided chest tube placement were discussed with the patient  in detail. All questions were answered. Informed consent was given to proceed with the procedure.    MODERATE SEDATION: Versed 2 mg IV; Fentanyl 100 mcg IV. During the time out, immediately prior to the administration of medications, the patient was reassessed for adequacy to receive conscious sedation.  Under physician supervision, Versed and fentanyl   were administered for moderate sedation. Pulse oximetry, heart rate and blood pressure were continuously monitored by an independent trained observer. The physician spent 8 minutes of face-to-face sedation time with the patient.    CONTRAST: None.  ANTIBIOTICS: None.  ADDITIONAL MEDICATIONS: None.    FLUOROSCOPIC TIME: 1.6 minutes.  RADIATION DOSE: Air Kerma: 5 mGy.    COMPLICATIONS: No immediate complications.    STERILE BARRIER TECHNIQUE: Maximum sterile barrier technique was used. Cutaneous antisepsis was performed at the operative site with application of 2% chlorhexidine and large sterile drape. Prior to the procedure, the  and assistant performed   hand hygiene and wore hat, mask, sterile gown, and sterile gloves during the entire procedure.    PROCEDURE:  Utilizing fluoroscopic imaging, a  image was obtained. The  skin was anesthetized using 1% lidocaine. A 19-gauge/4 [place] [employer] needle/catheter combination was advanced under image guidance into the left pleural space. The needle was removed. Through   the needle, a 0.035 inch Amplatz wire was coiled within the pleural space. The catheter was removed. The tract was serially dilated. Over the wire, a 10 Fr pigtail drainage catheter was placed. The catheter was then attached to a Pleur-Evac device. The   catheter was secured to the skin utilizing a 2-0 nylon suture. A clean and sterile dressing was applied. The patient tolerated the procedure well.     FINDINGS:   imaging demonstrated mild to moderate left pneumothorax. Postplacement fluoroscopic imaging demonstrates good positioning of the pigtail drainage catheter.       Impression    IMPRESSION:    Uncomplicated image guided placement of left 10 Cambodian pleural drainage catheter. Drainage catheter attached to Pleur-Evac. If need for discharge, this can be converted to a Heimlich valve.

## 2023-12-04 NOTE — CONSULTS
GENERAL SURGERY CONSULTATION    Gabriela Eason  [place]  Medical Record #:  [id number]  YOB: 1995  Age:  28 year old     Date of Consultation: 2023    Reason for Consultation: Left recurrent spontaneous pneumothorax with pain    Gabriela Eason is a 28 year old female who presents with a history of sudden onset of pain on 12/3/2023.  She is familiar with this symptom complex as she has had bilateral pneumothoraces repaired in the past.  She underwent surgical intervention on the right and subsequently left side with pleurodesis and resection of blebs.  At the current time she is seen in the emergency room.  Her pain initially was 15 out of 10 by her report and is currently 4 5 with medication.  The pain is in the apex of the left lung anteriorly and radiating medially toward the heart.  She is not short of breath however.  Pain is somewhat increased with inspiration.  She has not had recent trauma and the pain was onset at rest.    PHH:    Past Medical History:   Diagnosis Date    Acute respiratory failure (H) 2017    Anxiety     Depressive disorder     PONV (postoperative nausea and vomiting)     Posttraumatic stress disorder     Status post thoracostomy tube placement (H) 2017        Past Surgical History:   Procedure Laterality Date     SECTION N/A 2022    Procedure: PRIMARY  SECTION;  Surgeon: Michael Ortiz MD;  [facility]    LAPAROSCOPIC SALPINGECTOMY Bilateral 10/28/2022    Procedure: LAPAROSCOPIC BILATERAL SALPINGECTOMY;  Surgeon: Michael Ortiz MD;  [place] [facility]    [place]ORACOSCOPY Right 2017    Procedure: RIGHT VIDEO ASSISTED THORACOSCOPY WITH MECHANICAL PLEURODESIS AND APICAL PLEURECTOMY THROUGH A SUBXYPHIOD APPROACH;  Surgeon: Ricky Hernandez MD;  Location: [facility];  Service:     TONSILLECTOMY Bilateral     ZZC THORACOSCOPY,DX NO BX Left 2020    Procedure: THORACOSCOPY, LEFT  LUNG WEDGE RESECTION, PLEURODESIS, CHEST TUBE PLACEMENT;  Surgeon: Dax Arias MD;  Location: [facility];  Service: General       ALLERGIES:  Adhesive tape    MEDS:    Current Facility-Administered Medications:     acetaminophen (TYLENOL) tablet 650 mg, 650 mg, Oral, Q4H PRN **OR** acetaminophen (TYLENOL) Suppository 650 mg, 650 mg, Rectal, Q4H PRN, Livan Junior MD    calcium carbonate (TUMS) chewable tablet 1,000 mg, 1,000 mg, Oral, 4x Daily PRN, Livan Junior MD    HYDROmorphone (DILAUDID) half-tab 1 mg, 1 mg, Oral, Q4H PRN, Livan Junior MD    HYDROmorphone (DILAUDID) injection 0.2 mg, 0.2 mg, Intravenous, Q2H PRN, Livan Junior MD, 0.2 mg at 12/04/23 0119    HYDROmorphone (DILAUDID) injection 0.4 mg, 0.4 mg, Intravenous, Q2H PRN, Livan Junior MD, 0.4 mg at 12/04/23 1116    HYDROmorphone (DILAUDID) tablet 2 mg, 2 mg, Oral, Q4H PRN, Livan Junior MD    lidocaine (LMX4) cream, , Topical, Q1H PRN, Livan Junior MD    lidocaine 1 % 0.1-1 mL, 0.1-1 mL, Other, Q1H PRN, Livan Junior MD    naloxone (NARCAN) injection 0.2 mg, 0.2 mg, Intravenous, Q2 Min PRN **OR** naloxone (NARCAN) injection 0.4 mg, 0.4 mg, Intravenous, Q2 Min PRN **OR** naloxone (NARCAN) injection 0.2 mg, 0.2 mg, Intramuscular, Q2 Min PRN **OR** naloxone (NARCAN) injection 0.4 mg, 0.4 mg, Intramuscular, Q2 Min PRN, Livan Junior MD    ondansetron (ZOFRAN ODT) ODT tab 4 mg, 4 mg, Oral, Q6H PRN **OR** ondansetron (ZOFRAN) injection 4 mg, 4 mg, Intravenous, Q6H PRN, Livan Junior MD    pantoprazole (PROTONIX) EC tablet 40 mg, 40 mg, Oral, QAM AC, Jose Contreras MD, 40 mg at 12/04/23 0658    senna-docusate (SENOKOT-S/PERICOLACE) 8.6-50 MG per tablet 1 tablet, 1 tablet, Oral, BID PRN **OR** senna-docusate (SENOKOT-S/PERICOLACE) 8.6-50 MG per tablet 2 tablet, 2 tablet, Oral, BID PRN, Livan Junior MD    sodium chloride (PF) 0.9% PF flush 3 mL, 3 mL, Intracatheter, Q8H, Livan Junior  "MD JAMAAL, 3 mL at 12/04/23 0119    sodium chloride (PF) 0.9% PF flush 3 mL, 3 mL, Intracatheter, q1 min prn, Livan Junior MD  No current outpatient medications on file.    SOCIAL HABITS:    History   Smoking Status    Never   Smokeless Tobacco    Never     Social History    Substance and Sexual Activity      Alcohol use: No      History   Drug Use No       FAMILY HISTORY:  No family history on file.    REVIEW OF SYSTEMS: Noted and reviewed.  Without cardiac respiratory CNS peripheral vascular arterial or venous insufficiency disease specifically she is without deep vein thrombosis phlebitis or varicose veins.  Surgical history includes the bilateral pneumothoraces care as well has laparoscopy with tubal ligation and endometrial ablation.  He is a non-smoker nondrinker.    PE:    BP 96/58 (BP Location: Left arm)   Pulse 66   Temp 99.2  F (37.3  C) (Oral)   Resp 21   Ht 1.715 m (5' 7.5\")   Wt 52 kg (114 lb 9.6 oz)   SpO2 100%   BMI 17.68 kg/m      HEENT: Normal  Chest: Normal without subcutaneous air and without pain on palpation  Lungs: Essentially equal breath sounds right and left side  Heart: Heart rate 80/min regular  Abd: Benign  Ext: Normal  Vascular: Normal    LABS:    Recent Labs   Lab 12/03/23  1635      CO2 23   BUN 10.9      Recent Labs   Lab 12/03/23  1635   WBC 4.9   HGB 13.5   HCT 39.9       No results for input(s): \"ALKPHOS\", \"BILITOT\", \"ALT\", \"AST\" in the last 168 hours.    Invalid input(s): \"BILIDIR\", \"PROT\", \"LABALBU\"  No results for input(s): \"AMYLASE\" in the last 168 hours.  No results for input(s): \"INR\" in the last 168 hours.    XRAYS: X-ray and CT personally reviewed and with reviewed with radiology.    ASSESSMENT: Small left recurrent pneumothorax.    PLAN: After review with radiologist we will proceed with placement of chest tube or aspiration of air with potential of Heimlich valve.  This will be done today or tomorrow morning.  Patient is familiar with Heimlich valve " as she has had 1 of these previously.  She is agreeable with this approach.  Thanks for allow me to assist in Kelsi's care    Dax ledesma md  [facility]

## 2023-12-04 NOTE — PROGRESS NOTES
"PRIMARY DIAGNOSIS: \"GENERIC\" NURSING  OUTPATIENT/OBSERVATION GOALS TO BE MET BEFORE DISCHARGE:  ADLs back to baseline: Yes    Activity and level of assistance: Ambulating independently.    Pain status: Improved but still requiring IV narcotics.    Return to near baseline physical activity: Yes     Discharge Planner Nurse   Safe discharge environment identified: Yes  Barriers to discharge: Yes, chest CT w/ contrast pending and pain still an issue       Entered by: Madan Barbosa RN 12/04/2023 11:59 AM     Pt AxOx4 on oxymask. Chest CT done, pt to have chest tube put in today by IR. Endorses non-radiating chest pain when deep breathing rated at 9/10, PRN IV dilaudid given x 2. SR on tele.   "

## 2023-12-04 NOTE — UTILIZATION REVIEW
Admission Status; Secondary Review Determination  Under the authority of the Utilization Management Committee, the utilization review process indicated a secondary review on the above patient. The review outcome is based on review of the medical records, discussions with staff, and applying clinical experience noted on the date of the review.     (x) Inpatient Status Appropriate - This patient's medical care is consistent with medical management for inpatient care and reasonable inpatient medical practice.   RATIONALE FOR DETERMINATION   Ms. Eason is a 29 yo female with a PMH of previous pneumothorax and prior VATS with mechanical pleruodesis who presents to the ED with left-sided chest pain.  Serial CXR today significant for increasing size of pneumothorax with supportive care alone.  Gen surgery consulted and recommending IR consult for left chest tube placement today and likely conversion to heimlich valve at discharge.  She is still requiring oxy mask for adequate oxygenation; earlier today was having significant pain, tachycardia and tachypnea and is requiring ongoing inpatient evaluation, treatment and clinical monitoring at this time.   At the time of admission with the information available to the attending physician more than 2 nights Hospital complex care was anticipated, based on patient risk of adverse outcome if treated as outpatient and complex care required. Inpatient admission is appropriate based on the Medicare guidelines.   The information on this document is developed by the utilization review team in order for the business office to ensure compliance. This only denotes the appropriateness of proper admission status and does not reflect the quality of care rendered.   The definitions of Inpatient Status and Observation Status used in making the determination above are those provided in the CMS Coverage Manual, Chapter 1 and Chapter 6, section 70.4.     Sincerely,     Lyn High,  DO  Utilization Review  Physician Advisor

## 2023-12-04 NOTE — PLAN OF CARE
PRIMARY DIAGNOSIS: ACUTE PAIN  OUTPATIENT/OBSERVATION GOALS TO BE MET BEFORE DISCHARGE:  1. Pain Status: Improved but still requiring IV narcotics.    2. Return to near baseline physical activity: Yes    3. Cleared for discharge by consultants (if involved): No    Discharge Planner Nurse   Safe discharge environment identified: Yes  Barriers to discharge: Yes       Entered by: Vu Matute RN 12/04/2023 4:03 AM     Please review provider order for any additional goals.   Nurse to notify provider when observation goals have been met and patient is ready for discharge.Goal Outcome Evaluation:Nurse to notify provider when observation goals have been met and patient is ready for discharge. Pt is alert and oriented. Pt now on RA with saturation above 90%. Continues to c/o pain to left chest area radiating to right side and needing prn pain medication.  Telemetry reading NSR. Pt is up independently in the room.

## 2023-12-04 NOTE — PRE-PROCEDURE
GENERAL PRE-PROCEDURE:   Procedure:  Left chest tube placement  Date/Time:  12/4/2023 2:30 PM    Written consent obtained?: Yes    Risks and benefits: Risks, benefits and alternatives were discussed    Consent given by:  Patient  Patient states understanding of procedure being performed: Yes    Patient's understanding of procedure matches consent: Yes    Procedure consent matches procedure scheduled: Yes    Expected level of sedation:  Moderate  Appropriately NPO:  Yes  ASA Class:  3  Mallampati  :  Grade 1- soft palate, uvula, tonsillar pillars, and posterior pharyngeal wall visible  Lungs:  Lungs clear with good breath sounds bilaterally  Heart:  Normal heart sounds and rate  History & Physical reviewed:  History and physical reviewed and no updates needed  Statement of review:  I have reviewed the lab findings, diagnostic data, medications, and the plan for sedation

## 2023-12-04 NOTE — PROGRESS NOTES
"  Pulmonary/Critical Care Consult Team Note    Gabriela Eason,  1995, MRN [id number]  Admitting Dx: Primary spontaneous pneumothorax [J93.11]  Date / Time of Admission:  12/3/2023  3:28 PM    She came in due to chest pain  Denies dyspnea or coughing  It started as soon as she woke up.   Nothing out of the ordinary the night before  No strenuous activity, no heavy lifting, no bearing down etc    Assessment/Plan: Gabriela Eason is a 28 year old female with PMHx of  bilateral pneumothoraces. She initially had her pneumothorax back in .  Evaluated by Dr. Hernandez, underwent right VATS with mechanical pleurodesis and apical pleurectomy through subxiphoid approach. Left side pneumothorax on 2020, patient was seen by Dr. Arias and underwent thoracoscopy, left lung wedge resection and pleurodesis. Patient was seen in ED on January and 2022 diagnosed with small right side pneumothorax, discharged home after observation who returns to the ED for chest pain found to have a small left sided pneumothorax    Pneumothorax  - persistent on CT Chest  - Surgery consulted for long term solution vs observation  - chest tube vs aspiration via IR    Will follow peripherally given chest tube mngmt per surgery service,  please let our service know if any change in clinical condition or questions.    Medical Care Time excluding procedures and family discussions greater than: 45 Minutes    Risk Factors Present on Admission:  Clinically Significant Risk Factors Present on Admission                       # Cachexia: Estimated body mass index is 17.68 kg/m  as calculated from the following:    Height as of this encounter: 1.715 m (5' 7.5\").    Weight as of this encounter: 52 kg (114 lb 9.6 oz).                    Karla Emery DO  Pulmonary and Critical Care Attending  pgr [phone]    Allergies   Allergen Reactions    Adhesive Tape Rash       Meds: See MAR    Physical Exam:  BP 96/58 (BP Location: Left arm)   " "Pulse 66   Temp 99.2  F (37.3  C) (Oral)   Resp 21   Ht 1.715 m (5' 7.5\")   Wt 52 kg (114 lb 9.6 oz)   SpO2 100%   BMI 17.68 kg/m    Intake/Output this shift:  No intake/output data recorded.  GEN: sitting up in bed, NAD  HEENT: MMM  CVS: regular rhythm, no murmurs  RESP: CTA BL, left sided chest tube in place  ABD: Soft, No abdominal pain with palpation, no guarding, no rigidity  EXT: Warm, well perfused, no edema  NEURO: moving all extremities, nonfocal  PSYCH: pleasant    Pertinent Labs: Latest lab results in EHR personally reviewed.   CMP  Recent Labs   Lab 12/03/23  1635      POTASSIUM 3.8   CHLORIDE 104   CO2 23   ANIONGAP 11   GLC 86   BUN 10.9   CR 0.68   GFRESTIMATED >90   JENNY 8.8     CBC  Recent Labs   Lab 12/03/23  1635   WBC 4.9   RBC 4.50   HGB 13.5   HCT 39.9   MCV 89   MCH 30.0   MCHC 33.8   RDW 11.9        INRNo lab results found in last 7 days.  Arterial Blood GasNo lab results found in last 7 days.    Cultures: not yet available.    Imaging: personally reviewed.   Results for orders placed or performed during the hospital encounter of 12/03/23   Chest XR,  PA & LAT    Narrative    EXAM: XR CHEST 2 VIEWS  LOCATION: [facility]  DATE: 12/3/2023    INDICATION: possible pneumothorax  COMPARISON: 10/13/2022      Impression    IMPRESSION: Small left pneumothorax measures 1 cm at the apex. The lungs are clear.   XR Chest 1 View    Narrative    EXAM: XR CHEST 1 VIEW  LOCATION: [facility]  DATE: 12/3/2023    INDICATION: Follow up left side pneumothorax  COMPARISON: 12/03/2023 1533 hours      Impression    IMPRESSION: Heart size and pulmonary vascularity normal. Left upper pneumothorax has slightly increased in size, now with maximal width measuring 15 mm, previously measuring 10 mm. Surgical changes medial aspects of both upper lobes. No acute infiltrates   or effusions.   XR Chest Port 1 View    Narrative    EXAM: CHEST SINGLE VIEW " PORTABLE  LOCATION: [facility]  DATE/TIME: 12/4/2023 5:50 AM CST    INDICATION: Left-sided pneumothorax. Follow-up.  COMPARISON: 12/03/2023 at 1846 hours.      Impression    IMPRESSION:   1. No convincing interval change since the recent comparison study.  2. Small left pneumothorax which measures 1.4 cm from the visceral to parietal pleura at the lateral aspect of the upper left hemithorax, not convincingly changed.                  CT Chest w/o Contrast    Narrative    EXAM: CT CHEST W/O CONTRAST  LOCATION: [facility]  DATE: 12/4/2023    INDICATION: pneumothorax on recent chest x-ray, follow-up exam  COMPARISON: Chest radiograph from earlier today, chest CT from 03/05/2022  TECHNIQUE: CT chest without IV contrast. Multiplanar reformats were obtained. Dose reduction techniques were used.  CONTRAST: None.    FINDINGS:   LUNGS AND PLEURA: Again seen is the small left-sided pneumothorax. There is slight scarring in the pleural space in the left lung apex which may be from prior intervention. Patchy areas of atelectasis in the left lung. Wedge resection changes in the lung   apices. No effusions or suspicious pulmonary nodules.    MEDIASTINUM/AXILLAE: Heart size is normal. No lymphadenopathy. No thoracic aortic aneurysm.    CORONARY ARTERY CALCIFICATION: None.    UPPER ABDOMEN: No significant finding.    MUSCULOSKELETAL: No acute osseous findings.      Impression    IMPRESSION:   1.  Similar small left pneumothorax. Small amount of linear scarring in the left lung apex pleural space which may be due to prior intervention.  2.  Bilateral upper lobe wedge resection changes.         Patient Active Problem List   Diagnosis    Tension pneumothorax    Tension pneumothorax, spontaneous    Acute respiratory failure (H)    Status post thoracostomy tube placement (H)    Constipation    Pneumothorax    Chest tube in place    Pneumothorax on right    Encounter for triage in  pregnant patient    CPD (cephalo-pelvic disproportion)    Primary spontaneous pneumothorax     Karla Emery DO  Pulmonary and Critical Care Attending  pgr [phone]    Securely message with the Vocera Web Console (learn more here)

## 2023-12-05 ENCOUNTER — APPOINTMENT (OUTPATIENT)
Dept: RADIOLOGY | Facility: HOSPITAL | Age: 28
End: 2023-12-05
Attending: SURGERY
Payer: COMMERCIAL

## 2023-12-05 PROCEDURE — 99207 PR APP CREDIT; MD BILLING SHARED VISIT: CPT | Mod: FS

## 2023-12-05 PROCEDURE — 250N000013 HC RX MED GY IP 250 OP 250 PS 637

## 2023-12-05 PROCEDURE — 99207 PR NO BILLABLE SERVICE THIS VISIT: CPT | Mod: FS | Performed by: FAMILY MEDICINE

## 2023-12-05 PROCEDURE — 71045 X-RAY EXAM CHEST 1 VIEW: CPT

## 2023-12-05 PROCEDURE — 120N000001 HC R&B MED SURG/OB

## 2023-12-05 PROCEDURE — 250N000013 HC RX MED GY IP 250 OP 250 PS 637: Performed by: INTERNAL MEDICINE

## 2023-12-05 PROCEDURE — 250N000011 HC RX IP 250 OP 636: Mod: JZ | Performed by: INTERNAL MEDICINE

## 2023-12-05 RX ORDER — OXYCODONE HYDROCHLORIDE 5 MG/1
10 TABLET ORAL EVERY 6 HOURS PRN
Status: DISCONTINUED | OUTPATIENT
Start: 2023-12-05 | End: 2023-12-07 | Stop reason: HOSPADM

## 2023-12-05 RX ORDER — OXYCODONE HYDROCHLORIDE 5 MG/1
5 TABLET ORAL EVERY 6 HOURS PRN
Status: DISCONTINUED | OUTPATIENT
Start: 2023-12-05 | End: 2023-12-07 | Stop reason: HOSPADM

## 2023-12-05 RX ADMIN — PANTOPRAZOLE SODIUM 40 MG: 40 TABLET, DELAYED RELEASE ORAL at 06:51

## 2023-12-05 RX ADMIN — OXYCODONE HYDROCHLORIDE 5 MG: 5 TABLET ORAL at 10:30

## 2023-12-05 RX ADMIN — OXYCODONE HYDROCHLORIDE 5 MG: 5 TABLET ORAL at 18:39

## 2023-12-05 RX ADMIN — ACETAMINOPHEN 650 MG: 325 TABLET, FILM COATED ORAL at 02:37

## 2023-12-05 RX ADMIN — HYDROMORPHONE HYDROCHLORIDE 0.2 MG: 0.2 INJECTION, SOLUTION INTRAMUSCULAR; INTRAVENOUS; SUBCUTANEOUS at 06:50

## 2023-12-05 RX ADMIN — ACETAMINOPHEN 650 MG: 325 TABLET, FILM COATED ORAL at 13:36

## 2023-12-05 RX ADMIN — ACETAMINOPHEN 650 MG: 325 TABLET, FILM COATED ORAL at 20:27

## 2023-12-05 RX ADMIN — ACETAMINOPHEN 650 MG: 325 TABLET, FILM COATED ORAL at 08:01

## 2023-12-05 ASSESSMENT — ACTIVITIES OF DAILY LIVING (ADL)
ADLS_ACUITY_SCORE: 20
ADLS_ACUITY_SCORE: 18
ADLS_ACUITY_SCORE: 20
ADLS_ACUITY_SCORE: 18
ADLS_ACUITY_SCORE: 20

## 2023-12-05 NOTE — PLAN OF CARE
Problem: Adult Inpatient Plan of Care  Goal: Absence of Hospital-Acquired Illness or Injury  Intervention: Identify and Manage Fall Risk  Recent Flowsheet Documentation  Taken 12/4/2023 1611 by Karen Guthrie RN  Safety Promotion/Fall Prevention:   nonskid shoes/slippers when out of bed   patient and family education  Intervention: Prevent and Manage VTE (Venous Thromboembolism) Risk  Recent Flowsheet Documentation  Taken 12/4/2023 1611 by Karen Guthrie RN  VTE Prevention/Management: SCDs (sequential compression devices) off     Problem: Pain Acute  Goal: Optimal Pain Control and Function  Intervention: Prevent or Manage Pain  Recent Flowsheet Documentation  Taken 12/4/2023 1611 by Karen Guthrie RN  Medication Review/Management: medications reviewed   Goal Outcome Evaluation:  Pt has severe pain in the upper left chest where she has a chest tube placed today for pneumothorax.  CT on suction at -20 without air leaks.  CT dressing changed by IR nurse to a tegaderm as the pressure tape was causing pt discomfort and itching.  Pt had 2 doses of IV dilaudid with eventual good relief.  She reports dyspnea with activity.  Diminished breath sounds left lobes.  On telemetry with NSR.  She had severe pain in a right hand IV so another was placed after application of lidocaine and placement and flush are more comfortale. Tolerated regular diet.

## 2023-12-05 NOTE — PROGRESS NOTES
Progress Note    Assessment/Plan  Continue chest tube today.  CT scan placement of chest tube from yesterday reviewed.  Reviewed the current situation with the patient.  We will leave her on suction today.  May do waterseal and repeat chest x-ray tomorrow.  Chest x-ray ordered for today.  She is quite comfortable today.  Potential of Heimlich valve and discharge tomorrow reviewed with the patient.    Principal Problem:    Primary spontaneous pneumothorax      Subjective  As above.  Patient reasonably comfortable without the pain that she had admission to the hospital.  Objective    Vital signs in last 24 hours  Temp:  [97.9  F (36.6  C)-99.2  F (37.3  C)] 98.6  F (37  C)  Pulse:  [] 60  Resp:  [7-36] 16  BP: ()/(55-71) 104/62  SpO2:  [83 %-100 %] 100 %  Weight:   [unfilled]    Intake/Output last 3 shifts  No intake/output data recorded.  Intake/Output this shift:  No intake/output data recorded.      Physical Exam  Good respiratory effort.  No obvious air leak on her Pleur-evac at this point.  We will review x-ray when completed.    Pertinent Labs   Lab Results   Component Value Date    WBC 4.9 12/03/2023    HGB 13.5 12/03/2023    HCT 39.9 12/03/2023    MCV 89 12/03/2023     12/03/2023             Pertinent Radiology     [unfilled]        Dax Arias MD

## 2023-12-05 NOTE — PLAN OF CARE
"  Problem: Adult Inpatient Plan of Care  Goal: Plan of Care Review  Description: The Plan of Care Review/Shift note should be completed every shift.  The Outcome Evaluation is a brief statement about your assessment that the patient is improving, declining, or no change.  This information will be displayed automatically on your shift  note.  Outcome: Progressing     Problem: Adult Inpatient Plan of Care  Goal: Patient-Specific Goal (Individualized)  Description: You can add care plan individualizations to a care plan. Examples of Individualization might be:  \"Parent requests to be called daily at 9am for status\", \"I have a hard time hearing out of my right ear\", or \"Do not touch me to wake me up as it startles  me\".  Outcome: Progressing     Problem: Adult Inpatient Plan of Care  Goal: Absence of Hospital-Acquired Illness or Injury  Intervention: Identify and Manage Fall Risk  Recent Flowsheet Documentation  Taken 12/5/2023 0800 by Juancho Fulton RN  Safety Promotion/Fall Prevention:   activity supervised   assistive device/personal items within reach     Problem: Gas Exchange Impaired  Goal: Optimal Gas Exchange  Outcome: Progressing   Goal Outcome Evaluation:       Patient received PRN Oxycodone 5 mg PO for pain at chest tube site, Chest tube in place, no air leaks noted, suction at -20. Patient able to verbalize needs.                 "

## 2023-12-05 NOTE — PROGRESS NOTES
[facility]    Medicine Progress Note - Hospitalist Service    Date of Admission:  12/3/2023    Assessment & Plan   Gabriela Eason is a 28 year old female with history of pevious pneumothorax, s/p underwent right VATS with mechanical pleurodesis, who presents with left sided chest pain. Patient was admitted 12/3/23 for left-sided spontaneous pneumothorax.      Per chart review: Pneumothorax back in 2017. Underwent right VATS with mechanical pleurodesis and apical pleurectomy through subxiphoid approach. Left side pneumothorax on March 2020, patient was seen by Dr. Arias and underwent thoracoscopy, left lung wedge resection and pleurodesis. Seen in ED on January and March 2022 diagnosed with small right side pneumothorax, discharged home after observation.      Spontaneous pneumothroax, left, small   -  CXR trending 1 cm (12/3) --> 1.5cm/1.4cm (12/4) --> 6 mm (12/5)  - CT chest 12/4 showing small, L pneumothorax, small scarring in L lung apex, bilateral upper lobe wedge resection   - Appreciate pulm consult, continue to follow   - Appreciate gen surg consult; continue to follow  - CT placed by IR 12/4/23. Per surgery, continue CT and suction today. Consider waterseal and repeat CXR tomorrow per surgery.  - Continuous O2 2-3L NC  - Pain management with scheduled tylenol. PRN oxycodone 5-10 mg q 6 hours. Patient report no relief with PO Diluadid; discontinued. States she does well with percocet in past.     Atypical chest pain  Suspect secondary to PNX   - EKG in ED shows sinus rhythm, no acute ST or T wave changes per my review  Without significant ACS RF  Continue pain management as above    History of PTSD   History of Anxiety/Depressive Disorder   Currently stable, not on any home meds          Diet: Regular Diet Adult    DVT Prophylaxis: SCDs  Cho Catheter: Not present  Lines: None     Cardiac Monitoring: ACTIVE order. Indication: Tachyarrhythmias, acute (48 hours)  Code Status: Full  "Code      Clinically Significant Risk Factors Present on Admission                       # Cachexia: Estimated body mass index is 17.68 kg/m  as calculated from the following:    Height as of this encounter: 1.715 m (5' 7.5\").    Weight as of this encounter: 52 kg (114 lb 9.6 oz).              Disposition Plan      Expected Discharge Date: 12/06/2023    Discharge Delays: Specialist Consult (enter specialist & decision needed in comments)  Oxygen Needs - Arrange Home O2  IV Medication - consider oral or Home Infusion    Discharge Comments: chest tube          The patient's care was discussed with the Attending Physician, Dr. Valeria Norton who independently met with and assessed the patient and is agreement with the assessment and plan     Talita Ng PA-C  Hospitalist [facility]  Securely message with [employer] (more info)  Text page via [employer] Paging/Directory   ______________________________________________________________________    Interval History   Tolerated CT placement well, however, states she is continuing with 7/10 L shoulder pain. States PO Diluadid does not work for her, states she has done well with Percocet in the past. Denies SOB, cough, wheezing, difficulty breathing, leg pain or swelling. No other complaints or concerns from patient.     Physical Exam   Vital Signs: Temp: 98.6  F (37  C) Temp src: Oral BP: 104/62 Pulse: 60   Resp: 16 SpO2: 100 % O2 Device: None (Room air) Oxygen Delivery: 10 LPM  Weight: 114 lbs 9.6 oz    Constitutional: awake, alert, no apparent distress, and appears stated age  Respiratory: No increased work of breathing, no accessory muscle use, clear to auscultation bilaterally. R sided chest tube in place, no surrounding erythema   Cardiovascular: Regular rate and rhythm, normal S1 and S2  Musculoskeletal: no lower extremity pitting edema present.  Neuropsychiatric: Appropriate mood, affect and eye contact. Cooperative.      Medical Decision Making "             Data         Imaging results reviewed over the past 24 hrs:   Recent Results (from the past 24 hour(s))   CT Chest w/o Contrast    Narrative    EXAM: CT CHEST W/O CONTRAST  LOCATION: [facility]  DATE: 12/4/2023    INDICATION: pneumothorax on recent chest x-ray, follow-up exam  COMPARISON: Chest radiograph from earlier today, chest CT from 03/05/2022  TECHNIQUE: CT chest without IV contrast. Multiplanar reformats were obtained. Dose reduction techniques were used.  CONTRAST: None.    FINDINGS:   LUNGS AND PLEURA: Again seen is the small left-sided pneumothorax. There is slight scarring in the pleural space in the left lung apex which may be from prior intervention. Patchy areas of atelectasis in the left lung. Wedge resection changes in the lung   apices. No effusions or suspicious pulmonary nodules.    MEDIASTINUM/AXILLAE: Heart size is normal. No lymphadenopathy. No thoracic aortic aneurysm.    CORONARY ARTERY CALCIFICATION: None.    UPPER ABDOMEN: No significant finding.    MUSCULOSKELETAL: No acute osseous findings.      Impression    IMPRESSION:   1.  Similar small left pneumothorax. Small amount of linear scarring in the left lung apex pleural space which may be due to prior intervention.  2.  Bilateral upper lobe wedge resection changes.     IR Chest Tube Place Non Tunneled Left    Narrative    [facility]  LOCATION: [facility]  DATE: 12/4/2023    PROCEDURE:  1. FLUOROSCOPICALLY GUIDED CHEST TUBE PLACEMENT  2. MODERATE SEDATION    INTERVENTIONAL RADIOLOGIST: Cecilio Meneses MD    INDICATION: Patient is a 28 old female with a history of left pneumothorax with severe pain who presents for left-sided chest tube placement.    CONSENT: The risks, benefits and alternatives of left-sided chest tube placement were discussed with the patient  in detail. All questions were answered. Informed consent was given to proceed with the procedure.    MODERATE  SEDATION: Versed 2 mg IV; Fentanyl 100 mcg IV. During the time out, immediately prior to the administration of medications, the patient was reassessed for adequacy to receive conscious sedation.  Under physician supervision, Versed and fentanyl   were administered for moderate sedation. Pulse oximetry, heart rate and blood pressure were continuously monitored by an independent trained observer. The physician spent 8 minutes of face-to-face sedation time with the patient.    CONTRAST: None.  ANTIBIOTICS: None.  ADDITIONAL MEDICATIONS: None.    FLUOROSCOPIC TIME: 1.6 minutes.  RADIATION DOSE: Air Kerma: 5 mGy.    COMPLICATIONS: No immediate complications.    STERILE BARRIER TECHNIQUE: Maximum sterile barrier technique was used. Cutaneous antisepsis was performed at the operative site with application of 2% chlorhexidine and large sterile drape. Prior to the procedure, the  and assistant performed   hand hygiene and wore hat, mask, sterile gown, and sterile gloves during the entire procedure.    PROCEDURE:  Utilizing fluoroscopic imaging, a  image was obtained. The skin was anesthetized using 1% lidocaine. A 19-gauge/4 Hungarian GotGameeh needle/catheter combination was advanced under image guidance into the left pleural space. The needle was removed. Through   the needle, a 0.035 inch Amplatz wire was coiled within the pleural space. The catheter was removed. The tract was serially dilated. Over the wire, a 10 Fr pigtail drainage catheter was placed. The catheter was then attached to a Pleur-Evac device. The   catheter was secured to the skin utilizing a 2-0 nylon suture. A clean and sterile dressing was applied. The patient tolerated the procedure well.     FINDINGS:   imaging demonstrated mild to moderate left pneumothorax. Postplacement fluoroscopic imaging demonstrates good positioning of the pigtail drainage catheter.       Impression    IMPRESSION:    Uncomplicated image guided placement of left 10  Kinyarwanda pleural drainage catheter. Drainage catheter attached to Pleur-Evac. If need for discharge, this can be converted to a Heimlich valve.   XR Chest 1 View    Narrative    EXAM: XR CHEST 1 VIEW  LOCATION: [facility]  DATE: 12/5/2023    INDICATION: pneumo  COMPARISON: 12/04/2023      Impression    IMPRESSION: A new left-sided pigtail chest tube catheter has been placed into the left apex. Previously identified left apical pneumothorax is markedly decreased in size now measuring 6 mm at the apex. Stable appearance of bilateral apical surgical   sutures secondary to prior bilateral bleb resection procedures. No right-sided pneumothorax. No acute infiltrate. Normal heart size.

## 2023-12-05 NOTE — PLAN OF CARE
Problem: Pain Acute  Goal: Optimal Pain Control and Function  Outcome: Progressing   Goal Outcome Evaluation:  She denied pain at midnight & rated her pain at 0230 low when she had her scheduled tylenol.  At 0645 she rated her pain a 7 & received 0.2 of dilaudid as she says the oral dilaudid doesn't work.                       Quinolones Counseling:  I discussed with the patient the risks of fluoroquinolones including but not limited to GI upset, allergic reaction, drug rash, diarrhea, dizziness, photosensitivity, yeast infections, liver function test abnormalities, tendonitis/tendon rupture.

## 2023-12-05 NOTE — PLAN OF CARE
Problem: Adult Inpatient Plan of Care  Goal: Absence of Hospital-Acquired Illness or Injury  Intervention: Identify and Manage Fall Risk  Recent Flowsheet Documentation  Taken 12/5/2023 1640 by Karen Guthrie RN  Safety Promotion/Fall Prevention:   nonskid shoes/slippers when out of bed   patient and family education  Intervention: Prevent and Manage VTE (Venous Thromboembolism) Risk  Recent Flowsheet Documentation  Taken 12/5/2023 1640 by Karen Guthrie RN  VTE Prevention/Management: SCDs (sequential compression devices) off  Goal: Optimal Comfort and Wellbeing  Intervention: Monitor Pain and Promote Comfort  Recent Flowsheet Documentation  Taken 12/5/2023 1640 by Karen Guthrie RN  Pain Management Interventions: medication (see MAR)     Problem: Pain Acute  Goal: Optimal Pain Control and Function  Intervention: Develop Pain Management Plan  Recent Flowsheet Documentation  Taken 12/5/2023 1640 by Karen Guthrie RN  Pain Management Interventions: medication (see MAR)  Intervention: Prevent or Manage Pain  Recent Flowsheet Documentation  Taken 12/5/2023 1640 by Karen Guthrie RN  Medication Review/Management: medications reviewed   Goal Outcome Evaluation:  Pt reports pain is greatly improved with Oxycodone 5 mg.  Denies dyspnea.  Improved breath sounds on the left and resps overall are deeper than yesterday.  Pt is up to the bathroom with assist to manage chest tube.  CT site is clean covered with a transparent dressing and a biopatch.  Continuous suction without air leaks.  Poor appetite for dinner but she ate a good lunch.

## 2023-12-05 NOTE — PROGRESS NOTES
Care Management Follow Up    Length of Stay (days): 1    Expected Discharge Date: 12/06/2023     Concerns to be Addressed:     discharge planning  Patient plan of care discussed at interdisciplinary rounds: Yes    Anticipated Discharge Disposition:  home no needs     Anticipated Discharge Services:  n/a   Anticipated Discharge DME:  n/a    Patient/family educated on Medicare website which has current facility and service quality ratings:  yes  Education Provided on the Discharge Plan:  yes  Patient/Family in Agreement with the Plan:  yes    Referrals Placed by CM/SW:  yes  Private pay costs discussed: Not applicable    Additional Information:  No needs anticipated from CM at discharge per pt; independent at baseline. Care management to follow for discharge recommendations and progression of care through hospitalization. Family to transport home.  8:47 AM    CHONG Centeno  12/5/2023

## 2023-12-06 ENCOUNTER — APPOINTMENT (OUTPATIENT)
Dept: RADIOLOGY | Facility: HOSPITAL | Age: 28
End: 2023-12-06
Attending: SURGERY
Payer: COMMERCIAL

## 2023-12-06 PROCEDURE — 250N000013 HC RX MED GY IP 250 OP 250 PS 637: Performed by: INTERNAL MEDICINE

## 2023-12-06 PROCEDURE — 99207 PR APP CREDIT; MD BILLING SHARED VISIT: CPT | Mod: FS

## 2023-12-06 PROCEDURE — 99207 PR NO BILLABLE SERVICE THIS VISIT: CPT | Mod: FS | Performed by: FAMILY MEDICINE

## 2023-12-06 PROCEDURE — 250N000013 HC RX MED GY IP 250 OP 250 PS 637

## 2023-12-06 PROCEDURE — 120N000001 HC R&B MED SURG/OB

## 2023-12-06 PROCEDURE — 71045 X-RAY EXAM CHEST 1 VIEW: CPT

## 2023-12-06 RX ADMIN — OXYCODONE HYDROCHLORIDE 5 MG: 5 TABLET ORAL at 19:34

## 2023-12-06 RX ADMIN — ACETAMINOPHEN 650 MG: 325 TABLET, FILM COATED ORAL at 03:19

## 2023-12-06 RX ADMIN — ACETAMINOPHEN 650 MG: 325 TABLET, FILM COATED ORAL at 19:35

## 2023-12-06 RX ADMIN — ACETAMINOPHEN 650 MG: 325 TABLET, FILM COATED ORAL at 13:27

## 2023-12-06 RX ADMIN — ACETAMINOPHEN 650 MG: 325 TABLET, FILM COATED ORAL at 08:52

## 2023-12-06 RX ADMIN — PANTOPRAZOLE SODIUM 40 MG: 40 TABLET, DELAYED RELEASE ORAL at 06:55

## 2023-12-06 ASSESSMENT — ACTIVITIES OF DAILY LIVING (ADL)
ADLS_ACUITY_SCORE: 18

## 2023-12-06 NOTE — PLAN OF CARE
"  Problem: Adult Inpatient Plan of Care  Goal: Plan of Care Review  Description: The Plan of Care Review/Shift note should be completed every shift.  The Outcome Evaluation is a brief statement about your assessment that the patient is improving, declining, or no change.  This information will be displayed automatically on your shift  note.  Outcome: Progressing  Goal: Patient-Specific Goal (Individualized)  Description: You can add care plan individualizations to a care plan. Examples of Individualization might be:  \"Parent requests to be called daily at 9am for status\", \"I have a hard time hearing out of my right ear\", or \"Do not touch me to wake me up as it startles  me\".  Outcome: Progressing  Goal: Absence of Hospital-Acquired Illness or Injury  Outcome: Progressing  Intervention: Prevent Skin Injury  Recent Flowsheet Documentation  Taken 12/6/2023 0001 by Trish Jensen, RN  Body Position: position changed independently  Goal: Optimal Comfort and Wellbeing  Outcome: Progressing  Goal: Readiness for Transition of Care  Outcome: Progressing   Goal Outcome Evaluation:      Pt denies pain and sob. Zero drng in chest tube. Denies needs.                  "

## 2023-12-06 NOTE — PLAN OF CARE
"  Problem: Adult Inpatient Plan of Care  Goal: Plan of Care Review  Description: The Plan of Care Review/Shift note should be completed every shift.  The Outcome Evaluation is a brief statement about your assessment that the patient is improving, declining, or no change.  This information will be displayed automatically on your shift  note.  Outcome: Progressing     Problem: Adult Inpatient Plan of Care  Goal: Patient-Specific Goal (Individualized)  Description: You can add care plan individualizations to a care plan. Examples of Individualization might be:  \"Parent requests to be called daily at 9am for status\", \"I have a hard time hearing out of my right ear\", or \"Do not touch me to wake me up as it startles  me\".  Outcome: Progressing     Problem: Adult Inpatient Plan of Care  Goal: Absence of Hospital-Acquired Illness or Injury  Intervention: Identify and Manage Fall Risk  Recent Flowsheet Documentation  Taken 12/6/2023 0800 by Juancho Fulton, RN  Safety Promotion/Fall Prevention:   activity supervised   assistive device/personal items within reach     Problem: Adult Inpatient Plan of Care  Goal: Absence of Hospital-Acquired Illness or Injury  Intervention: Prevent Skin Injury  Recent Flowsheet Documentation  Taken 12/6/2023 0800 by Juancho Fulton, RN  Body Position: position changed independently   Goal Outcome Evaluation:  No sob noted, pain 3/10, improved from yesterday, scheduled tylenol has been effective in treating pain. Chestube -20 continuous. Telemetry NSR. Patient placed to water seal.                      "

## 2023-12-06 NOTE — PROGRESS NOTES
Progress Note    Assessment/Plan  Patient very comfortable today.  Denies left chest pain.  Chest tube in place.  Chest x-ray is normal.  No evidence pneumothorax left or right side.  Patient placed to [facility] at this time.  If stable will obtain x-ray in a.m. potential discharge tomorrow with removal of chest tube.    Principal Problem:    Primary spontaneous pneumothorax      Subjective  As above.  Patient quite comfortable.  Good respiratory effort.  Objective    Vital signs in last 24 hours  Temp:  [98.2  F (36.8  C)-98.9  F (37.2  C)] 98.2  F (36.8  C)  Pulse:  [64-78] 73  Resp:  [16-22] 22  BP: ()/(50-59) 104/53  SpO2:  [95 %-100 %] 98 %  Weight:   [unfilled]    Intake/Output last 3 shifts  I/O last 3 completed shifts:  In: 485 [P.O.:480; I.V.:5]  Out: 0   Intake/Output this shift:  I/O this shift:  In: 240 [P.O.:240]  Out: 0       Physical Exam  Breath sounds clear bilaterally and equal bilaterally.  No significant subcutaneous air and a chest tube without obvious air leak or fluid output.    Pertinent Labs   Lab Results   Component Value Date    WBC 4.9 12/03/2023    HGB 13.5 12/03/2023    HCT 39.9 12/03/2023    MCV 89 12/03/2023     12/03/2023             Pertinent Radiology     [unfilled]        Dax Arias MD

## 2023-12-07 ENCOUNTER — APPOINTMENT (OUTPATIENT)
Dept: RADIOLOGY | Facility: HOSPITAL | Age: 28
End: 2023-12-07
Attending: SURGERY
Payer: COMMERCIAL

## 2023-12-07 VITALS
WEIGHT: 114.6 LBS | SYSTOLIC BLOOD PRESSURE: 104 MMHG | HEIGHT: 68 IN | HEART RATE: 76 BPM | OXYGEN SATURATION: 99 % | RESPIRATION RATE: 15 BRPM | BODY MASS INDEX: 17.37 KG/M2 | TEMPERATURE: 98.2 F | DIASTOLIC BLOOD PRESSURE: 72 MMHG

## 2023-12-07 PROCEDURE — 250N000013 HC RX MED GY IP 250 OP 250 PS 637: Performed by: INTERNAL MEDICINE

## 2023-12-07 PROCEDURE — 71045 X-RAY EXAM CHEST 1 VIEW: CPT

## 2023-12-07 PROCEDURE — 71046 X-RAY EXAM CHEST 2 VIEWS: CPT

## 2023-12-07 PROCEDURE — 250N000013 HC RX MED GY IP 250 OP 250 PS 637

## 2023-12-07 PROCEDURE — 99238 HOSP IP/OBS DSCHRG MGMT 30/<: CPT | Mod: FS | Performed by: FAMILY MEDICINE

## 2023-12-07 PROCEDURE — 99207 PR APP CREDIT; MD BILLING SHARED VISIT: CPT | Mod: FS

## 2023-12-07 RX ORDER — ACETAMINOPHEN 325 MG/1
650 TABLET ORAL EVERY 6 HOURS
COMMUNITY
Start: 2023-12-07

## 2023-12-07 RX ORDER — OXYCODONE HYDROCHLORIDE 5 MG/1
5 TABLET ORAL EVERY 6 HOURS PRN
Qty: 10 TABLET | Refills: 0 | Status: SHIPPED | OUTPATIENT
Start: 2023-12-07

## 2023-12-07 RX ADMIN — ACETAMINOPHEN 650 MG: 325 TABLET, FILM COATED ORAL at 09:09

## 2023-12-07 RX ADMIN — PANTOPRAZOLE SODIUM 40 MG: 40 TABLET, DELAYED RELEASE ORAL at 09:11

## 2023-12-07 ASSESSMENT — ACTIVITIES OF DAILY LIVING (ADL)
ADLS_ACUITY_SCORE: 18

## 2023-12-07 NOTE — PLAN OF CARE
Problem: Gas Exchange Impaired  Goal: Optimal Gas Exchange  Outcome: Progressing     Problem: Pain Acute  Goal: Optimal Pain Control and Function  Outcome: Progressing   Goal Outcome Evaluation:    Vitals stable on room air.  Prn tylenol and oxycodone given at start of shift for back pain. Denied pain on reassessment. Chest tube on L to water seal. No air leak. Up independently in room. Surgery plan to remove chest tube today.

## 2023-12-07 NOTE — PROGRESS NOTES
Progress Note    Assessment/Plan  Patient doing very well clinically.  No significant chest pain except for chest tube entrance site.  She has been on waterseal since yesterday.  Up in the room without difficulty.  Around the chest tube site.  Chest tube DC'd.  Will arrange for chest x-ray in 1 to 2 hours before discharge.  Patient aware that if her significant chest pain comes back it would be a sign of a recurrent pneumo again and she will present herself to the emergency room.    Principal Problem:    Primary spontaneous pneumothorax      Subjective  As above patient quite comfortable today.  Objective    Vital signs in last 24 hours  Temp:  [98.5  F (36.9  C)-98.7  F (37.1  C)] 98.7  F (37.1  C)  Pulse:  [77-82] 82  Resp:  [18-20] 18  BP: ()/(59-61) 99/59  SpO2:  [97 %-99 %] 99 %  Weight:   [unfilled]    Intake/Output last 3 shifts  I/O last 3 completed shifts:  In: 240 [P.O.:240]  Out: 0   Intake/Output this shift:  No intake/output data recorded.      Physical Exam  Good respiratory effort.  No significant subcutaneous air.  Chest tube without air leak.  No significant drainage.    Pertinent Labs   Lab Results   Component Value Date    WBC 4.9 12/03/2023    HGB 13.5 12/03/2023    HCT 39.9 12/03/2023    MCV 89 12/03/2023     12/03/2023             Pertinent Radiology     [unfilled]        Dax Arias MD

## 2023-12-07 NOTE — DISCHARGE SUMMARY
"[facility]  Hospitalist Discharge Summary      Date of Admission:  12/3/2023  Date of Discharge:  12/7/2023  Discharging Provider: Talita Ng PA-C  Discharge Service: Hospitalist Service    Discharge Diagnoses   Spontaneous pneumothorax, left   Chest tube insertion and removal   Atpyical chest pain   H/o recurrent spontaneous pneumothorax    Clinically Significant Risk Factors     # Cachexia: Estimated body mass index is 17.68 kg/m  as calculated from the following:    Height as of this encounter: 1.715 m (5' 7.5\").    Weight as of this encounter: 52 kg (114 lb 9.6 oz).         Discharge Disposition   Discharged to home  Condition at discharge: Stable    Hospital Course   Gabriela Eason is a 28 year old female with history of pevious pneumothorax, s/p right VATS with mechanical pleurodesis, who was admitted 12/3/23 for left-sided spontaneous pneumothorax. Initial work up done in the ED demonstrated a 1 cm left pneumothorax seen on CXR and CT chest. Patient underwent chest tube placement 12/4/23 without complications. Patient stable throughout IP course, pain controlled with oxycodone as needed and patient placed on continuous oxygen. Chest tube was removed 12/7/23 and repeat CXR showing trace left pneumothorax. Vitally stable throughout hospital course, no hypoxic events.     Patient with extensive pneumothorax and pulmonary history, please see notes from chart review: Pneumothorax back in 2017. Underwent right VATS with mechanical pleurodesis and apical pleurectomy through subxiphoid approach. Left side pneumothorax on March 2020, patient was seen by Dr. Arias and underwent thoracoscopy, left lung wedge resection and pleurodesis. Seen in ED on January and March 2022 diagnosed with small right side pneumothorax, discharged home after observation.      Spontaneous pneumothroax, left, small   - CT chest 12/4 showing small, L pneumothorax, small scarring in L lung apex, bilateral upper lobe " wedge resection   -  CXR trending 1 cm (12/3) --> 1.5cm/1.4cm (12/4) --> 6 mm (12/5)  - Stable unchanged PNX on CXR 12/6-12/7  - CT placed by IR 12/4/23.   - Placed on waterseal by surgery 12/6  - Chest tube removed 12/7. CXR s/p removal with trace left apical pneumothorax. Appreciate gen surg consult  - Continuous O2 2-3L NC  - Pain management with scheduled tylenol, PRN oxycodone 5-10 mg q 6 hours     Atypical chest pain  Suspect secondary to PNX   - EKG in ED shows sinus rhythm, no acute ST or T wave changes per my review  Without significant ACS RF  Continue pain management as above     History of PTSD   History of Anxiety/Depressive Disorder   Currently stable, not on any home meds       Consultations This Hospital Stay   PULMONARY IP CONSULT  SURGERY GENERAL IP CONSULT  CARE MANAGEMENT / SOCIAL WORK IP CONSULT  INTERVENTIONAL RADIOLOGY ADULT/PEDS IP CONSULT    Code Status   Full Code    Time Spent on this Encounter   I, Talita Ng PA-C, personally saw the patient today and spent less than or equal to 30 minutes discharging this patient.     This case was discussed with the Attending Physician, Dr. Valeria Norton, who independently met with and assessed the patient and who is in agreement with the disposition and discharge.    Talita Ng PA-C  [address] [address]  Phone: [phone]  Fax: [phone]  ______________________________________________________________________    Physical Exam   Vital Signs: Temp: 98.2  F (36.8  C) Temp src: Oral BP: 104/72 Pulse: 76   Resp: 15 SpO2: 99 % O2 Device: None (Room air)    Weight: 114 lbs 9.6 oz    Constitutional: awake, alert, no apparent distress, and appears stated age  Respiratory: No increased work of breathing, no accessory muscle use, clear to auscultation bilaterally, no crackles or wheezing  Cardiovascular: Regular rate and rhythm, normal S1 and S2, no S3 or S4, and no murmur noted  Neuropsychiatric:  Appropriate mood, affect and eye contact. Cooperative.    Primary Care Physician   [facility]    Discharge Orders      Reason for your hospital stay    Spontaneous left pneumothorax  Chest tube placement and removal     Follow-up and recommended labs and tests     Follow up with primary care provider, [facility], within 7 days for hospital follow- up.      Additional follow-up per Dr. Arias's recommendations     Activity    Your activity upon discharge: activity as tolerated     Diet    Follow this diet upon discharge: Regular Diet Adult       Significant Results and Procedures   Most Recent 3 CBC's:  Recent Labs   Lab Test 12/03/23  1635 10/28/22  0729 09/09/22  0746 09/08/22  0752 05/28/22  1440 03/11/22  0905   WBC 4.9  --   --   --  9.7 5.4   HGB 13.5 12.6 10.5*   < > 12.8 12.5   MCV 89  --   --   --  91 91     --   --   --  208 173    < > = values in this interval not displayed.   ,   Results for orders placed or performed during the hospital encounter of 12/03/23   Chest XR,  PA & LAT    Narrative    EXAM: XR CHEST 2 VIEWS  LOCATION: [facility]  DATE: 12/3/2023    INDICATION: possible pneumothorax  COMPARISON: 10/13/2022      Impression    IMPRESSION: Small left pneumothorax measures 1 cm at the apex. The lungs are clear.   XR Chest 1 View    Narrative    EXAM: XR CHEST 1 VIEW  LOCATION: [facility]  DATE: 12/3/2023    INDICATION: Follow up left side pneumothorax  COMPARISON: 12/03/2023 1533 hours      Impression    IMPRESSION: Heart size and pulmonary vascularity normal. Left upper pneumothorax has slightly increased in size, now with maximal width measuring 15 mm, previously measuring 10 mm. Surgical changes medial aspects of both upper lobes. No acute infiltrates   or effusions.   XR Chest Port 1 View    Narrative    EXAM: CHEST SINGLE VIEW PORTABLE  LOCATION: [facility]  DATE/TIME:  12/4/2023 5:50 AM CST    INDICATION: Left-sided pneumothorax. Follow-up.  COMPARISON: 12/03/2023 at 1846 hours.      Impression    IMPRESSION:   1. No convincing interval change since the recent comparison study.  2. Small left pneumothorax which measures 1.4 cm from the visceral to parietal pleura at the lateral aspect of the upper left hemithorax, not convincingly changed.                  CT Chest w/o Contrast    Narrative    EXAM: CT CHEST W/O CONTRAST  LOCATION: [facility]  DATE: 12/4/2023    INDICATION: pneumothorax on recent chest x-ray, follow-up exam  COMPARISON: Chest radiograph from earlier today, chest CT from 03/05/2022  TECHNIQUE: CT chest without IV contrast. Multiplanar reformats were obtained. Dose reduction techniques were used.  CONTRAST: None.    FINDINGS:   LUNGS AND PLEURA: Again seen is the small left-sided pneumothorax. There is slight scarring in the pleural space in the left lung apex which may be from prior intervention. Patchy areas of atelectasis in the left lung. Wedge resection changes in the lung   apices. No effusions or suspicious pulmonary nodules.    MEDIASTINUM/AXILLAE: Heart size is normal. No lymphadenopathy. No thoracic aortic aneurysm.    CORONARY ARTERY CALCIFICATION: None.    UPPER ABDOMEN: No significant finding.    MUSCULOSKELETAL: No acute osseous findings.      Impression    IMPRESSION:   1.  Similar small left pneumothorax. Small amount of linear scarring in the left lung apex pleural space which may be due to prior intervention.  2.  Bilateral upper lobe wedge resection changes.     IR Chest Tube Place Non Tunneled Left    Narrative    [place] [facility]  LOCATION: [facility]  DATE: 12/4/2023    PROCEDURE:  1. FLUOROSCOPICALLY GUIDED CHEST TUBE PLACEMENT  2. MODERATE SEDATION    INTERVENTIONAL RADIOLOGIST: Cecilio Meneses MD    INDICATION: Patient is a 28 old female with a history of left pneumothorax with severe pain  who presents for left-sided chest tube placement.    CONSENT: The risks, benefits and alternatives of left-sided chest tube placement were discussed with the patient  in detail. All questions were answered. Informed consent was given to proceed with the procedure.    MODERATE SEDATION: Versed 2 mg IV; Fentanyl 100 mcg IV. During the time out, immediately prior to the administration of medications, the patient was reassessed for adequacy to receive conscious sedation.  Under physician supervision, Versed and fentanyl   were administered for moderate sedation. Pulse oximetry, heart rate and blood pressure were continuously monitored by an independent trained observer. The physician spent 8 minutes of face-to-face sedation time with the patient.    CONTRAST: None.  ANTIBIOTICS: None.  ADDITIONAL MEDICATIONS: None.    FLUOROSCOPIC TIME: 1.6 minutes.  RADIATION DOSE: Air Kerma: 5 mGy.    COMPLICATIONS: No immediate complications.    STERILE BARRIER TECHNIQUE: Maximum sterile barrier technique was used. Cutaneous antisepsis was performed at the operative site with application of 2% chlorhexidine and large sterile drape. Prior to the procedure, the  and assistant performed   hand hygiene and wore hat, mask, sterile gown, and sterile gloves during the entire procedure.    PROCEDURE:  Utilizing fluoroscopic imaging, a  image was obtained. The skin was anesthetized using 1% lidocaine. A 19-gauge/4 [place] [employer] needle/catheter combination was advanced under image guidance into the left pleural space. The needle was removed. Through   the needle, a 0.035 inch Amplatz wire was coiled within the pleural space. The catheter was removed. The tract was serially dilated. Over the wire, a 10 Fr pigtail drainage catheter was placed. The catheter was then attached to a Pleur-Evac device. The   catheter was secured to the skin utilizing a 2-0 nylon suture. A clean and sterile dressing was applied. The patient tolerated the  procedure well.     FINDINGS:   imaging demonstrated mild to moderate left pneumothorax. Postplacement fluoroscopic imaging demonstrates good positioning of the pigtail drainage catheter.       Impression    IMPRESSION:    Uncomplicated image guided placement of left 10 Russian pleural drainage catheter. Drainage catheter attached to Pleur-Evac. If need for discharge, this can be converted to a Heimlich valve.   XR Chest 1 View    Narrative    EXAM: XR CHEST 1 VIEW  LOCATION: [facility]  DATE: 12/5/2023    INDICATION: pneumo  COMPARISON: 12/04/2023      Impression    IMPRESSION: A new left-sided pigtail chest tube catheter has been placed into the left apex. Previously identified left apical pneumothorax is markedly decreased in size now measuring 6 mm at the apex. Stable appearance of bilateral apical surgical   sutures secondary to prior bilateral bleb resection procedures. No right-sided pneumothorax. No acute infiltrate. Normal heart size.   XR Chest 1 View    Narrative    EXAM: XR CHEST 1 VIEW  LOCATION: [facility]  DATE: 12/6/2023    INDICATION: pneumo  COMPARISON: Yesterday      Impression    IMPRESSION: Stable left-sided pigtail chest tube catheter in the left apex. Small stable left apical pneumothorax. . Stable appearance of bilateral apical surgical sutures secondary to prior bilateral bleb resection procedures. No right-sided   pneumothorax. No acute infiltrate. Normal heart size. Bilateral nipple piercings   XR Chest 1 View    Narrative    EXAM: XR CHEST 1 VIEW  LOCATION: [facility]  DATE: 12/7/2023    INDICATION: pneumo  COMPARISON: None.      Impression    IMPRESSION: Stable left-sided pigtail chest tube catheter in the left apex. Tiny stable left apical pneumothorax. . Stable appearance of bilateral apical surgical sutures secondary to prior bilateral bleb resection procedures. No right-sided   pneumothorax. No acute  infiltrate. Normal heart size. Bilateral nipple piercings     XR Chest 2 Views    Narrative    EXAM: XR CHEST 2 VIEWS  LOCATION: [facility]  DATE: 12/7/2023    INDICATION: pneumo  COMPARISON: 12/07/2023      Impression    IMPRESSION: Interval removal of left chest tube. Trace left apical pneumothorax. Postsurgical change in the apices bilaterally. Lungs are clear. Normal heart size.       Discharge Medications   Current Discharge Medication List        START taking these medications    Details   acetaminophen (TYLENOL) 325 MG tablet Take 2 tablets (650 mg) by mouth every 6 hours    Associated Diagnoses: Pneumothorax, unspecified type      oxyCODONE (ROXICODONE) 5 MG tablet Take 1 tablet (5 mg) by mouth every 6 hours as needed for moderate pain  Qty: 10 tablet, Refills: 0    Associated Diagnoses: Pneumothorax, unspecified type           Allergies   Allergies   Allergen Reactions    Adhesive Tape Rash

## 2023-12-07 NOTE — PROGRESS NOTES
"[facility]    Medicine Progress Note - Hospitalist Service    Date of Admission:  12/3/2023    Assessment & Plan   Gabriela Eason is a 28 year old female with history of pevious pneumothorax, s/p underwent right VATS with mechanical pleurodesis, who presents with left sided chest pain. Patient was admitted 12/3/23 for left-sided spontaneous pneumothorax.      Per chart review: Pneumothorax back in 2017. Underwent right VATS with mechanical pleurodesis and apical pleurectomy through subxiphoid approach. Left side pneumothorax on March 2020, patient was seen by Dr. Arias and underwent thoracoscopy, left lung wedge resection and pleurodesis. Seen in ED on January and March 2022 diagnosed with small right side pneumothorax, discharged home after observation.      Spontaneous pneumothroax, left, small   - CT chest 12/4 showing small, L pneumothorax, small scarring in L lung apex, bilateral upper lobe wedge resection   -  CXR trending 1 cm (12/3) --> 1.5cm/1.4cm (12/4) --> 6 mm (12/5)  - Stable unchanged PNX on CXR 12/6  - CT placed by IR 12/4/23.   - Placed on waterseal by surgery, repeat CXR tomorrow AM and plan for potential discharge if stable per surgery  - Continuous O2 2-3L NC  - Pain management with scheduled tylenol, PRN oxycodone 5-10 mg q 6 hours     Atypical chest pain  Suspect secondary to PNX   - EKG in ED shows sinus rhythm, no acute ST or T wave changes per my review  Without significant ACS RF  Continue pain management as above    History of PTSD   History of Anxiety/Depressive Disorder   Currently stable, not on any home meds          Diet: Regular Diet Adult    DVT Prophylaxis: SCDs  Cho Catheter: Not present  Lines: None     Cardiac Monitoring: None  Code Status: Full Code      Clinically Significant Risk Factors                         # Cachexia: Estimated body mass index is 17.68 kg/m  as calculated from the following:    Height as of this encounter: 1.715 m (5' 7.5\").   "  Weight as of this encounter: 52 kg (114 lb 9.6 oz)., PRESENT ON ADMISSION            Disposition Plan      Expected Discharge Date: 12/07/2023    Discharge Delays: Specialist Consult (enter specialist & decision needed in comments)  Oxygen Needs - Arrange Home O2  IV Medication - consider oral or Home Infusion    Discharge Comments: chest tube          The patient's care was discussed with the Attending Physician, Dr. Valeria Norton who independently met with and assessed the patient and is agreement with the assessment and plan     Talita Ng PA-C  Hospitalist Service  [facility]  Securely message with [employer] (more info)  Text page via [facility] Paging/Directory   ______________________________________________________________________    Interval History   Does not have any pain. No SOB, cough, wheezing. No leg swelling or leg pain. Tolerating CT in place well. States she is feeling ready for discharge. No other complaints.     Physical Exam   Vital Signs: Temp: 98.7  F (37.1  C) Temp src: Oral BP: 109/61 Pulse: 77   Resp: 20 SpO2: 99 % O2 Device: None (Room air)    Weight: 114 lbs 9.6 oz    Constitutional: awake, alert, no apparent distress, and appears stated age  Respiratory: No increased work of breathing, no accessory muscle use, clear to auscultation bilaterally. R sided chest tube in place, no surrounding erythema   Cardiovascular: Regular rate and rhythm, normal S1 and S2  Musculoskeletal: no lower extremity pitting edema present.  Neuropsychiatric: Appropriate mood, affect and eye contact. Cooperative.      Medical Decision Making             Data         Imaging results reviewed over the past 24 hrs:   Recent Results (from the past 24 hour(s))   XR Chest 1 View    Narrative    EXAM: XR CHEST 1 VIEW  LOCATION: [facility]  DATE: 12/6/2023    INDICATION: pneumo  COMPARISON: Yesterday      Impression    IMPRESSION: Stable left-sided pigtail chest tube catheter in the  left apex. Small stable left apical pneumothorax. . Stable appearance of bilateral apical surgical sutures secondary to prior bilateral bleb resection procedures. No right-sided   pneumothorax. No acute infiltrate. Normal heart size. Bilateral nipple piercings

## 2023-12-07 NOTE — PROGRESS NOTES
Chest xray impression relayed to Dr. Arias, stated okay to discharge from his standpoint. Updated hospitalist, discharge orders placed and work note given to patient.     Writer educated patient on activity level, diet, medications and where to pick oxycodone up for pain. Writer educated patient on follow up appointments and s/s of complications for further evaluation.     Pt discharged with boyfriend independently to vehicle at approx. 1750. Pt very happy to get to leave.

## 2023-12-07 NOTE — PLAN OF CARE
Goal Outcome Evaluation:    Pt AO x4. Denies pain. Chest tube removed, waiting for chest xray. Will discharge home this evening. Pt IND makes needs known.

## 2023-12-07 NOTE — PLAN OF CARE
"  Problem: Adult Inpatient Plan of Care  Goal: Plan of Care Review  Description: The Plan of Care Review/Shift note should be completed every shift.  The Outcome Evaluation is a brief statement about your assessment that the patient is improving, declining, or no change.  This information will be displayed automatically on your shift  note.  Outcome: Progressing  Goal: Patient-Specific Goal (Individualized)  Description: You can add care plan individualizations to a care plan. Examples of Individualization might be:  \"Parent requests to be called daily at 9am for status\", \"I have a hard time hearing out of my right ear\", or \"Do not touch me to wake me up as it startles  me\".  Outcome: Progressing  Goal: Absence of Hospital-Acquired Illness or Injury  Outcome: Progressing  Intervention: Prevent and Manage VTE (Venous Thromboembolism) Risk  Recent Flowsheet Documentation  Taken 12/6/2023 1600 by Kailey Dumont RN  VTE Prevention/Management: SCDs (sequential compression devices) off  Goal: Optimal Comfort and Wellbeing  Outcome: Progressing  Goal: Readiness for Transition of Care  Outcome: Progressing     Problem: Gas Exchange Impaired  Goal: Optimal Gas Exchange  Outcome: Progressing     Problem: Pain Acute  Goal: Optimal Pain Control and Function  Outcome: Progressing   Goal Outcome Evaluation:       Pt alert and oriented x4, able to make needs known. Pt denies pain. Pt\"s boyfriend bringing dinner for her. Chest tube to water seal at -20, no bubbles seen. Pt denies SOB or chest pain. Pt voiding spontaneously and had one BM this shift.                "

## 2023-12-07 NOTE — PLAN OF CARE
"  Problem: Adult Inpatient Plan of Care  Goal: Plan of Care Review  Description: The Plan of Care Review/Shift note should be completed every shift.  The Outcome Evaluation is a brief statement about your assessment that the patient is improving, declining, or no change.  This information will be displayed automatically on your shift  note.  Outcome: Progressing  Goal: Patient-Specific Goal (Individualized)  Description: You can add care plan individualizations to a care plan. Examples of Individualization might be:  \"Parent requests to be called daily at 9am for status\", \"I have a hard time hearing out of my right ear\", or \"Do not touch me to wake me up as it startles  me\".  Outcome: Progressing  Goal: Absence of Hospital-Acquired Illness or Injury  Outcome: Progressing  Intervention: Identify and Manage Fall Risk  Recent Flowsheet Documentation  Taken 12/7/2023 1557 by Kailey Dumont RN  Safety Promotion/Fall Prevention:   assistive device/personal items within reach   activity supervised   clutter free environment maintained  Intervention: Prevent and Manage VTE (Venous Thromboembolism) Risk  Recent Flowsheet Documentation  Taken 12/7/2023 1550 by Kailey Dumont RN  VTE Prevention/Management: SCDs (sequential compression devices) off  Goal: Optimal Comfort and Wellbeing  Outcome: Progressing  Goal: Readiness for Transition of Care  Outcome: Progressing     Problem: Gas Exchange Impaired  Goal: Optimal Gas Exchange  Outcome: Progressing     Problem: Pain Acute  Goal: Optimal Pain Control and Function  Outcome: Progressing   Goal Outcome Evaluation:       Pt alert and oriented x4. Pt denies pain. Pt eager to discharge pending chest xray and surgeon clearance.                  "

## 2023-12-08 NOTE — DISCHARGE INSTRUCTIONS
Writer educated patient on activity level, diet, medications and where to pick oxycodone up for pain. Writer educated patient on follow up appointments and s/s of complications for further evaluation.      Pt discharged with boyfriend independently to vehicle at approx. 1750. Pt very happy to get to leave.

## 2023-12-10 LAB
ATRIAL RATE - MUSE: 80 BPM
DIASTOLIC BLOOD PRESSURE - MUSE: NORMAL MMHG
INTERPRETATION ECG - MUSE: NORMAL
P AXIS - MUSE: 73 DEGREES
PR INTERVAL - MUSE: 138 MS
QRS DURATION - MUSE: 84 MS
QT - MUSE: 364 MS
QTC - MUSE: 419 MS
R AXIS - MUSE: 63 DEGREES
SYSTOLIC BLOOD PRESSURE - MUSE: NORMAL MMHG
T AXIS - MUSE: 46 DEGREES
VENTRICULAR RATE- MUSE: 80 BPM

## 2024-03-25 ENCOUNTER — HOSPITAL ENCOUNTER (EMERGENCY)
Facility: HOSPITAL | Age: 29
Discharge: HOME | End: 2024-03-25
Attending: EMERGENCY MEDICINE | Admitting: EMERGENCY MEDICINE
Payer: COMMERCIAL

## 2024-03-25 VITALS
HEIGHT: 68 IN | TEMPERATURE: 98.1 F | BODY MASS INDEX: 18.04 KG/M2 | OXYGEN SATURATION: 100 % | HEART RATE: 94 BPM | WEIGHT: 119 LBS | SYSTOLIC BLOOD PRESSURE: 113 MMHG | DIASTOLIC BLOOD PRESSURE: 67 MMHG | RESPIRATION RATE: 16 BRPM

## 2024-03-25 VITALS
RESPIRATION RATE: 16 BRPM | OXYGEN SATURATION: 100 % | SYSTOLIC BLOOD PRESSURE: 113 MMHG | HEART RATE: 84 BPM | DIASTOLIC BLOOD PRESSURE: 67 MMHG

## 2024-03-25 DIAGNOSIS — L03.032 PARONYCHIA OF TOE, LEFT: ICD-10-CM

## 2024-03-25 PROCEDURE — 99284 EMERGENCY DEPT VISIT MOD MDM: CPT

## 2024-03-25 PROCEDURE — 99281 EMR DPT VST MAYX REQ PHY/QHP: CPT

## 2024-03-25 RX ORDER — CEFDINIR 300 MG/1
300 CAPSULE ORAL 2 TIMES DAILY
Qty: 14 CAPSULE | Refills: 0 | Status: SHIPPED | OUTPATIENT
Start: 2024-03-25

## 2024-03-25 RX ORDER — FLUCONAZOLE 150 MG/1
TABLET ORAL
Qty: 2 TABLET | Refills: 0 | Status: SHIPPED | OUTPATIENT
Start: 2024-03-25 | End: 2024-03-28

## 2024-03-25 ASSESSMENT — COLUMBIA-SUICIDE SEVERITY RATING SCALE - C-SSRS
2. HAVE YOU ACTUALLY HAD ANY THOUGHTS OF KILLING YOURSELF IN THE PAST MONTH?: NO
1. IN THE PAST MONTH, HAVE YOU WISHED YOU WERE DEAD OR WISHED YOU COULD GO TO SLEEP AND NOT WAKE UP?: NO
6. HAVE YOU EVER DONE ANYTHING, STARTED TO DO ANYTHING, OR PREPARED TO DO ANYTHING TO END YOUR LIFE?: NO
1. IN THE PAST MONTH, HAVE YOU WISHED YOU WERE DEAD OR WISHED YOU COULD GO TO SLEEP AND NOT WAKE UP?: NO
6. HAVE YOU EVER DONE ANYTHING, STARTED TO DO ANYTHING, OR PREPARED TO DO ANYTHING TO END YOUR LIFE?: NO
2. HAVE YOU ACTUALLY HAD ANY THOUGHTS OF KILLING YOURSELF IN THE PAST MONTH?: NO

## 2024-03-25 ASSESSMENT — ACTIVITIES OF DAILY LIVING (ADL): ADLS_ACUITY_SCORE: 33

## 2024-03-25 NOTE — ED TRIAGE NOTES
Patient presents here for evaluation of redness and swelling at the base of her right great toe that occurred about one week following a pedicure. She reports drainage at the base of the toenail.      Triage Assessment (Adult)       Row Name 03/25/24 1316          Triage Assessment    Airway WDL WDL        Respiratory WDL    Respiratory WDL WDL        Skin Circulation/Temperature WDL    Skin Circulation/Temperature WDL WDL        Cardiac WDL    Cardiac WDL WDL        Peripheral/Neurovascular WDL    Peripheral Neurovascular WDL WDL        Cognitive/Neuro/Behavioral WDL    Cognitive/Neuro/Behavioral WDL WDL

## 2024-03-25 NOTE — ED TRIAGE NOTES
Patient presents here with reddness at the base of her right great toe that occurred about one week following a pedicure. She notes yellow drainage oozing at the bottom of the toenail.

## 2024-03-25 NOTE — ED PROVIDER NOTES
EMERGENCY DEPARTMENT ENCOUNTER      NAME: Gabriela Pérez  AGE: 28 year old female  YOB: 1995  MRN: [id number]  EVALUATION DATE & TIME: 3/25/2024  1:00 PM    PCP: No primary care provider on file.    ED PROVIDER: Lucien Crawley M.D.      Chief Complaint   Patient presents with    Wound Infection         FINAL IMPRESSION:  1. Paronychia of toe, left          ED COURSE & MEDICAL DECISION MAKING:    Pertinent Labs & Imaging studies reviewed. (See chart for details)  28 year old female presents to the Emergency Department for evaluation of numbness and redness to left great toe after a pedicure last week.  There has been some drainage also.  Describes as a throbbing achiness.  No prior similar episodes.  Emanation reveals a bandlike erythema across the eponychium approximately half centimeter in width.  No fluctuance.  Slight crusting.  No lymphangitic streaking.  Remainder exam unremarkable.  Patient with early paronychia.  Recommendations are for warm water soaks.  Will also start Omnicef.  Patient request Diflucan as she states she gets yeast infection with antibiotics.  Diflucan also given.. Patient appears non toxic with stable vitals signs. Overall exam is benign.    1:03 PM I met with the patient for the initial interview and physical examination. We discussed the plan for discharge and the patient is agreeable. Reviewed supportive cares, symptomatic treatment, outpatient follow up, and reasons to return to the Emergency Department. All questions and concerns were addressed. Patient to be discharged by ED RN.       At the conclusion of the encounter I discussed the results of all of the tests and the disposition. The questions were answered and return precautions provided. The patient or family acknowledged understanding and was agreeable with the care plan.       PPE: Provider wore paper mask.     MEDICATIONS GIVEN IN THE EMERGENCY:  Medications - No data to display    NEW PRESCRIPTIONS STARTED AT  TODAY'S ER VISIT  New Prescriptions    CEFDINIR (OMNICEF) 300 MG CAPSULE    Take 1 capsule (300 mg) by mouth 2 times daily    FLUCONAZOLE (DIFLUCAN) 150 MG TABLET    Take one tablet now, and one tablet in three days          =================================================================    HPI    Patient information was obtained from: patient    Use of Intrepreter: N/A         Gabriela Pérez is a 28 year old female with no pertient medical history on file who presents to the ED for evaluation of wound infection.     Patient reports she had a pedicure 1 week ago and began noticing swelling, drainage and pain from the great right toe a few days later. She notes she normally develops yeast infections with the pill form of antibiotics. Denies fever, chills.       REVIEW OF SYSTEMS   Constitutional:  Denies fever, chills  Respiratory:  Denies productive cough or increased work of breathing  Cardiovascular:  Denies chest pain, palpitations  GI:  Denies abdominal pain, nausea, vomiting, or change in bowel or bladder habits   Musculoskeletal:  Denies any new muscle/joint swelling  Skin:  Swelling/Drainage/Pain to the great right toe, Denies rash   Neurologic:  Denies focal weakness  All systems negative except as marked.     PAST MEDICAL HISTORY:  No past medical history on file.    PAST SURGICAL HISTORY:  No past surgical history on file.      CURRENT MEDICATIONS:    No current facility-administered medications for this encounter.    Current Outpatient Medications:     cefdinir (OMNICEF) 300 MG capsule, Take 1 capsule (300 mg) by mouth 2 times daily, Disp: 14 capsule, Rfl: 0    fluconazole (DIFLUCAN) 150 MG tablet, Take one tablet now, and one tablet in three days, Disp: 2 tablet, Rfl: 0    ALLERGIES:  Allergies   Allergen Reactions    Adhesive Tape        FAMILY HISTORY:  No family history on file.    SOCIAL HISTORY:        VITALS:  Patient Vitals for the past 24 hrs:   BP Temp Temp src Pulse Resp SpO2 Height Weight  "  03/25/24 1254 113/67 98.1  F (36.7  C) Oral 94 16 100 % 1.715 m (5' 7.5\") 54 kg (119 lb)        PHYSICAL EXAM    Constitutional:  Awake, alert, in no apparent distress  HENT:  Normocephalic, Atraumatic. Bilateral external ears normal. Oropharynx moist. Nose normal. Neck- Normal range of motion with no guarding, No midline cervical tenderness, Supple, No stridor.   Eyes:  PERRL, EOMI with no signs of entrapment, Conjunctiva normal, No discharge.   Musculoskeletal:  Intact distal pulses, No edema. Good range of motion in all major joints. No tenderness to palpation or major deformities noted.  Integument:  In the right great toe there is a band of erythema extending across the eponychium, no fluctuance, moderate tenderness, No rash.   Neurologic:  Alert & oriented, Normal motor function, Normal sensory function, No focal deficits noted.   Psychiatric:  Affect normal, Judgment normal, Mood normal.     LAB:  All pertinent labs reviewed and interpreted.       RADIOLOGY:  Reviewed all pertinent imaging. Please see official radiology report.  No orders to display       ITommy, am serving as a scribe to document services personally performed by Lucien Crawley MD, based on my observation and the provider's statements to me. ILucien MD attest that Tommy Faith is acting in a scribe capacity, has observed my performance of the services and has documented them in accordance with my direction.    Lucien Crawley M.D.  Emergency Medicine  [facility] EMERGENCY DEPARTMENT     Lucien Crawley MD  03/25/24 2770    "

## 2024-07-20 ENCOUNTER — HEALTH MAINTENANCE LETTER (OUTPATIENT)
Age: 29
End: 2024-07-20

## 2025-08-09 ENCOUNTER — HEALTH MAINTENANCE LETTER (OUTPATIENT)
Age: 30
End: 2025-08-09

## (undated) DEVICE — PACK MAJOR BASIN 673

## (undated) DEVICE — PACK MINOR SINGLE BASIN SSK3001

## (undated) DEVICE — GOWN IMPERVIOUS BREATHABLE SMART LG 89015

## (undated) DEVICE — ENDO TROCAR FIRST ENTRY KII FIOS Z-THRD 05X100MM CTF03

## (undated) DEVICE — CUSTOM PACK PELVISCOPY SMA5BPVHEA

## (undated) DEVICE — CATH FOLEY 16FR 5ML LUBRICATH LATEX 0165L16

## (undated) DEVICE — SU CHROMIC 0 CT 36" 914H

## (undated) DEVICE — BLADE CLIPPER PIVOT PURPLE DISP 9660

## (undated) DEVICE — SUTURE VICRYL+ 3-0 27IN CT-1 UND VCP258H

## (undated) DEVICE — GLOVE SURG PI ULTRA TOUCH M SZ 6-1/2 LF

## (undated) DEVICE — GLOVE SURG PI ULTRA TOUCH M SZ 8 LF

## (undated) DEVICE — SOL NACL 0.9% IRRIG 1000ML BOTTLE 2F7124

## (undated) DEVICE — PAD POS XL 1X20X40IN PINK PIGAZZI

## (undated) DEVICE — GLOVE SURG PI ULTRA TOUCH M SZ 7-1/2 LF

## (undated) DEVICE — GLOVE SURG PI ULTRA TOUCH M SZ 7 LF 42670

## (undated) DEVICE — SU ETHILON 4-0 PS-2 18" BLACK 1667H

## (undated) DEVICE — SYRINGE 10ML FILL SALINE FLUSH STERILE 306553

## (undated) DEVICE — SUCTION MANIFOLD NEPTUNE 2 SYS 1 PORT 702-025-000

## (undated) DEVICE — DRAPE IOBAN 2 C-SECTION 3M 6697

## (undated) DEVICE — PROTECTOR ARM STANDARD ONE STEP

## (undated) DEVICE — PREP CHLORAPREP 26ML TINTED HI-LITE ORANGE 930815

## (undated) DEVICE — TUBING SMOKE EVAC PNEUMOCLEAR HIGH FLOW 0620050250

## (undated) DEVICE — BANDAGE ADH LF 1X3 ABN3100A

## (undated) DEVICE — BRIEF STRETCH XL MPS40

## (undated) DEVICE — SOL WATER IRRIG 1000ML BOTTLE 2F7114

## (undated) DEVICE — SU VICRYL+ 0 27 UR6 VLT VCP603H

## (undated) DEVICE — SUTURE PDS 0 60IN CTX+ LOOPED PDP990G

## (undated) DEVICE — SYR 10ML FINGER CONTROL W/O NDL 309695

## (undated) DEVICE — GOWN IMPERVIOUS BREATHABLE 2XL/XLONG

## (undated) DEVICE — STPL SKIN SUBCUTICULAR INSORB  2030

## (undated) DEVICE — PREP SCRUB SOL EXIDINE 4% CHG 4OZ 29002-404

## (undated) DEVICE — PLATE GROUNDING ADULT W/CORD 9165L

## (undated) DEVICE — GOWN XXL 9575

## (undated) DEVICE — CUSTOM PACK C-SECTION LHE

## (undated) DEVICE — SYSTEM LAPAROVUE VISIBILITY LAPVUE10

## (undated) DEVICE — DRESSING MEPILEX BORDER POST-OP 4X12

## (undated) DEVICE — TUBING LAP SUCT/IRRIG STRYKER 250070500

## (undated) DEVICE — GLOVE BIOGEL INDICATOR 7.5 LF 41675

## (undated) RX ORDER — MORPHINE SULFATE 1 MG/ML
INJECTION, SOLUTION EPIDURAL; INTRATHECAL; INTRAVENOUS
Status: DISPENSED
Start: 2022-09-08

## (undated) RX ORDER — FENTANYL CITRATE-0.9 % NACL/PF 10 MCG/ML
PLASTIC BAG, INJECTION (ML) INTRAVENOUS
Status: DISPENSED
Start: 2022-09-08

## (undated) RX ORDER — FENTANYL CITRATE 50 UG/ML
INJECTION, SOLUTION INTRAMUSCULAR; INTRAVENOUS
Status: DISPENSED
Start: 2022-10-28

## (undated) RX ORDER — ONDANSETRON 2 MG/ML
INJECTION INTRAMUSCULAR; INTRAVENOUS
Status: DISPENSED
Start: 2022-09-08

## (undated) RX ORDER — LIDOCAINE HYDROCHLORIDE 10 MG/ML
INJECTION, SOLUTION INFILTRATION; PERINEURAL
Status: DISPENSED
Start: 2023-12-04

## (undated) RX ORDER — BUPIVACAINE HYDROCHLORIDE 2.5 MG/ML
INJECTION, SOLUTION INFILTRATION; PERINEURAL
Status: DISPENSED
Start: 2022-10-28

## (undated) RX ORDER — FENTANYL CITRATE 50 UG/ML
INJECTION, SOLUTION INTRAMUSCULAR; INTRAVENOUS
Status: DISPENSED
Start: 2022-09-08

## (undated) RX ORDER — FENTANYL CITRATE 50 UG/ML
INJECTION, SOLUTION INTRAMUSCULAR; INTRAVENOUS
Status: DISPENSED
Start: 2023-12-04

## (undated) RX ORDER — DEXAMETHASONE SODIUM PHOSPHATE 10 MG/ML
INJECTION INTRAMUSCULAR; INTRAVENOUS
Status: DISPENSED
Start: 2022-09-08